# Patient Record
Sex: MALE | Race: WHITE | Employment: UNEMPLOYED | URBAN - METROPOLITAN AREA
[De-identification: names, ages, dates, MRNs, and addresses within clinical notes are randomized per-mention and may not be internally consistent; named-entity substitution may affect disease eponyms.]

---

## 2017-01-06 ENCOUNTER — OFFICE VISIT (OUTPATIENT)
Dept: FAMILY MEDICINE | Facility: CLINIC | Age: 62
End: 2017-01-06
Payer: COMMERCIAL

## 2017-01-06 VITALS
SYSTOLIC BLOOD PRESSURE: 120 MMHG | WEIGHT: 315 LBS | TEMPERATURE: 97.8 F | HEART RATE: 88 BPM | HEIGHT: 78 IN | BODY MASS INDEX: 36.45 KG/M2 | DIASTOLIC BLOOD PRESSURE: 70 MMHG

## 2017-01-06 DIAGNOSIS — E11.65 POORLY CONTROLLED TYPE 2 DIABETES MELLITUS (H): ICD-10-CM

## 2017-01-06 DIAGNOSIS — E66.01 MORBID OBESITY WITH BMI OF 45.0-49.9, ADULT (H): ICD-10-CM

## 2017-01-06 DIAGNOSIS — K42.9 UMBILICAL HERNIA WITHOUT OBSTRUCTION AND WITHOUT GANGRENE: Primary | ICD-10-CM

## 2017-01-06 DIAGNOSIS — M50.30 DDD (DEGENERATIVE DISC DISEASE), CERVICAL: ICD-10-CM

## 2017-01-06 PROCEDURE — 99214 OFFICE O/P EST MOD 30 MIN: CPT | Performed by: FAMILY MEDICINE

## 2017-01-06 RX ORDER — OXYCODONE HCL 40 MG/1
40 TABLET, FILM COATED, EXTENDED RELEASE ORAL EVERY 12 HOURS
Qty: 60 TABLET | Refills: 0 | Status: SHIPPED | OUTPATIENT
Start: 2017-01-15 | End: 2017-01-24

## 2017-01-06 RX ORDER — HYDROCODONE BITARTRATE AND ACETAMINOPHEN 7.5; 325 MG/1; MG/1
1 TABLET ORAL 2 TIMES DAILY
Qty: 60 TABLET | Refills: 0 | Status: SHIPPED | OUTPATIENT
Start: 2017-01-15 | End: 2017-02-13

## 2017-01-06 RX ORDER — OXYCODONE HCL 20 MG/1
20 TABLET, FILM COATED, EXTENDED RELEASE ORAL EVERY 24 HOURS
Qty: 30 TABLET | Refills: 0 | Status: SHIPPED | OUTPATIENT
Start: 2017-01-15 | End: 2017-01-24

## 2017-01-06 NOTE — NURSING NOTE
"Chief Complaint   Patient presents with     RECHECK     abd pain       Initial /70 mmHg  Pulse 88  Temp(Src) 97.8  F (36.6  C) (Tympanic)  Ht 6' 7\" (2.007 m)  Wt 395 lb (179.171 kg)  BMI 44.48 kg/m2 Estimated body mass index is 44.48 kg/(m^2) as calculated from the following:    Height as of this encounter: 6' 7\" (2.007 m).    Weight as of this encounter: 395 lb (179.171 kg).  BP completed using cuff size: matthew Venegas CMA    "

## 2017-01-06 NOTE — PROGRESS NOTES
SUBJECTIVE:                                                    Michael Colorado is a 61 year old male who presents to clinic today for the following health issues:      Concern - follow up stomach pains     Onset: ongoing     Description:   Follow up pain also wondering about colonoscopy    Intensity:     Progression of Symptoms:      Accompanying Signs & Symptoms:         Previous history of similar problem:       Precipitating factors:   Worsened by:     Alleviating factors:  Improved by:        Therapies Tried and outcome:       Problem list and histories reviewed & adjusted, as indicated.  Additional history: as documented    Patient Active Problem List   Diagnosis     Other and unspecified disc disorder     Tinnitus     CARDIOVASCULAR SCREENING; LDL GOAL LESS THAN 130     Advanced directives, counseling/discussion     Primary localized osteoarthrosis, lower leg     Smoker     Morbid obesity with BMI of 45.0-49.9, adult (H)     Morbid obesity (H)     Chronic pain syndrome     Hepatitis C     Obstructive sleep apnea     Tobacco use disorder     Type 2 diabetes mellitus with hyperglycemia (H)     Past Surgical History   Procedure Laterality Date     Surgical history of -   10/03     Lt knee partial medial meniscectomy       Social History   Substance Use Topics     Smoking status: Current Every Day Smoker -- 1.00 packs/day for 30 years     Types: Cigarettes     Smokeless tobacco: Never Used     Alcohol Use: No      Comment: quit      Family History   Problem Relation Age of Onset     DIABETES Brother          Current Outpatient Prescriptions   Medication Sig Dispense Refill     [START ON 1/15/2017] oxyCODONE (OXYCONTIN) 40 MG 12 hr tablet Take 1 tablet (40 mg) by mouth every 12 hours 60 tablet 0     [START ON 1/15/2017] oxyCODONE (OXYCONTIN) 20 MG 12 hr tablet Take 1 tablet (20 mg) by mouth every 24 hours 30 tablet 0     [START ON 1/15/2017] HYDROcodone-acetaminophen (NORCO) 7.5-325 MG per tablet Take 1 tablet by  mouth 2 times daily 60 tablet 0     ACCU-CHEK YESSY PLUS test strip USE 1 STRIP AS INSTRUCTED 4 TIMES DAILY. 300 strip 2     blood glucose monitoring (NO BRAND SPECIFIED) test strip Use to test blood sugars 4 times daily or as directed please provide per formulary approval patient states Verio. 100 strip 3     metFORMIN (GLUCOPHAGE) 500 MG tablet TAKE 2 TABLETS BYMOUTH TWO TIMES DAILY WITH MEALS 120 tablet 0     NOVOLOG FLEXPEN 100 UNIT/ML soln INJECT 20 UNITS SUBCUTANEOUS 4 TIMES DAILY (WITH MEALS AND NIGHTLY) 15 mL 1     insulin glargine (LANTUS SOLOSTAR) 100 UNIT/ML PEN 23 units AM/ 23 units PM 1 Month 3     BD SHANELL U/F 32G X 4 MM insulin pen needle INJECT 1 NEEDLE SUBCUTANEOUSLY 4 TIMES DAILY 200 each 0     aspirin 81 MG tablet Take by mouth daily       Diphenhydramine-APAP, sleep, (TYLENOL PM EXTRA STRENGTH PO) Take by mouth At Bedtime       Allergies   Allergen Reactions     No Known Drug Allergies      Recent Labs   Lab Test  11/15/16   1149  07/18/16   0951 05/19/16 03/18/16   0947  12/03/15   1021   02/05/15   0837  04/29/13   1138   05/17/12   0904   A1C   --   7.6*   --   7.7*  6.7*   < >  7.0*  6.4*   < >  5.7   LDL   --    --    --    --   71   --   77  112   --   134*   HDL   --    --    --    --   28*   --   32*  35*   --   37*   TRIG   --    --    --    --   312*   --   287*  218*   --   167*   ALT  33   --    --    --    --    --   42  50   --   50   CR   --   0.67  0.58*  0.77  0.60*   < >  0.64*  0.60*   --   0.81   GFRESTIMATED   --   >90  Non  GFR Calc    >60  >90  Non  GFR Calc    >90  Non  GFR Calc     < >  >90  Non  GFR Calc    >90   --   >90   GFRESTBLACK   --   >90   GFR Calc     --   >90   GFR Calc    >90   GFR Calc     < >  >90   GFR Calc    >90   --   >90   POTASSIUM   --   4.0  3.7  4.1  3.9   < >  4.0  4.1   --   4.7   TSH   --    --    --    --    --    --    " --   1.25   --   1.68    < > = values in this interval not displayed.      BP Readings from Last 3 Encounters:   01/06/17 120/70   12/29/16 150/80   12/21/16 130/80    Wt Readings from Last 3 Encounters:   01/06/17 395 lb (179.171 kg)   12/29/16 389 lb (176.449 kg)   12/21/16 401 lb (181.892 kg)                    ROS:  Constitutional, HEENT, cardiovascular, pulmonary, gi and gu systems are negative, except as otherwise noted.    OBJECTIVE:                                                    /70 mmHg  Pulse 88  Temp(Src) 97.8  F (36.6  C) (Tympanic)  Ht 6' 7\" (2.007 m)  Wt 395 lb (179.171 kg)  BMI 44.48 kg/m2  Body mass index is 44.48 kg/(m^2).  GENERAL: healthy, alert and no distress  NECK: no adenopathy, no asymmetry, masses, or scars and thyroid normal to palpation  RESP: lungs clear to auscultation - no rales, rhonchi or wheezes  CV: regular rate and rhythm, normal S1 S2, no S3 or S4, no murmur, click or rub, no peripheral edema and peripheral pulses strong  ABDOMEN: soft, nontender, no hepatosplenomegaly, no masses and bowel sounds normal  MS: no gross musculoskeletal defects noted, no edema         ASSESSMENT/PLAN:                                                    ASSESSMENT / PLAN:  (K42.9) Umbilical hernia without obstruction and without gangrene  (primary encounter diagnosis)  Comment: had ER visit with pain, CT showed no abnormal finding, will have him to see surgery  And encouraged him to keep working on wt loss  Plan: GENERAL SURG ADULT REFERRAL            (E11.65) Poorly controlled type 2 diabetes mellitus (H)  Comment: still has fluctuating DM, will have him to see DM educator for refreshing and review insuline dosage   Plan: DIABETES EDUCATOR REFERRAL            (E66.01,  Z68.42) Morbid obesity with BMI of 45.0-49.9, adult (H)  Comment: mentioned above    Plan: DIABETES EDUCATOR REFERRAL            (M50.30) DDD (degenerative disc disease), cervical  Comment: stable with current dose, will " have him to try fill again, if needed PA, will try it(he may need gap coverage with [place] contin as we did last year)  Plan: oxyCODONE (OXYCONTIN) 40 MG 12 hr tablet,         oxyCODONE (OXYCONTIN) 20 MG 12 hr tablet,         HYDROcodone-acetaminophen (NORCO) 7.5-325 MG         per tablet              FUTURE APPOINTMENTS:       - Follow-up visit in 1 month     Edison New [place]  [facility]

## 2017-01-09 ENCOUNTER — OFFICE VISIT (OUTPATIENT)
Dept: SURGERY | Facility: CLINIC | Age: 62
End: 2017-01-09
Payer: COMMERCIAL

## 2017-01-09 VITALS
WEIGHT: 315 LBS | HEIGHT: 78 IN | HEART RATE: 85 BPM | DIASTOLIC BLOOD PRESSURE: 99 MMHG | BODY MASS INDEX: 36.45 KG/M2 | SYSTOLIC BLOOD PRESSURE: 165 MMHG

## 2017-01-09 DIAGNOSIS — K43.9 VENTRAL HERNIA WITHOUT OBSTRUCTION OR GANGRENE: Primary | ICD-10-CM

## 2017-01-09 PROCEDURE — 99244 OFF/OP CNSLTJ NEW/EST MOD 40: CPT | Performed by: SURGERY

## 2017-01-09 NOTE — MR AVS SNAPSHOT
"              After Visit Summary   2017    Michael Colorado    MRN: [id number]           Patient Information     Date Of Birth          1955        Visit Information        Provider Department      2017 11:30 AM Jairo Angel MD [facility]       Follow-ups after your visit        Who to contact     If you have questions or need follow up information about today's clinic visit or your schedule please contact [facility] directly at [phone].  Normal or non-critical lab and imaging results will be communicated to you by App Presshart, letter or phone within 4 business days after the clinic has received the results. If you do not hear from us within 7 days, please contact the clinic through [email or web address] or phone. If you have a critical or abnormal lab result, we will notify you by phone as soon as possible.  Submit refill requests through BodeTree or call your pharmacy and they will forward the refill request to us. Please allow 3 business days for your refill to be completed.          Additional Information About Your Visit        MyChart Information     B[employer]Tree lets you send messages to your doctor, view your test results, renew your prescriptions, schedule appointments and more. To sign up, go to [email or web address] . Click on \"Log in\" on the left side of the screen, which will take you to the Welcome page. Then click on \"Sign up Now\" on the right side of the page.     You will be asked to enter the access code listed below, as well as some personal information. Please follow the directions to create your username and password.     Your access code is: [email or web address]  Expires: 2017 11:51 AM     Your access code will  in 90 days. If you need help or a new code, please call your [place] clinic or [phone].        Care EveryWhere ID     This is your Care EveryWhere ID. This could be used by other organizations to access " "your [facility] medical records  [id number]        Your Vitals Were     Pulse Height BMI (Body Mass Index)             85 6' 7.5\" (2.019 m) 42.84 kg/m2          Blood Pressure from Last 3 Encounters:   01/09/17 165/99   01/06/17 120/70   12/29/16 150/80    Weight from Last 3 Encounters:   01/09/17 385 lb (174.635 kg)   01/06/17 395 lb (179.171 kg)   12/29/16 389 lb (176.449 kg)              Today, you had the following     No orders found for display       Primary Care Provider Office Phone # Fax #    Edison New [phone]       [address] [address]        Thank you!     Thank you for choosing [facility]  for your care. Our goal is always to provide you with excellent care. Hearing back from our patients is one way we can continue to improve our services. Please take a few minutes to complete the written survey that you may receive in the mail after your visit with us. Thank you!             Your Updated Medication List - Protect others around you: Learn how to safely use, store and throw away your medicines at www.disposemymeds.org.          This list is accurate as of: 1/9/17 12:09 PM.  Always use your most recent med list.                   Brand Name Dispense Instructions for use    * ACCU-CHEK YESSY PLUS test strip   Generic drug:  blood glucose monitoring     300 strip    USE 1 STRIP AS INSTRUCTED 4 TIMES DAILY.       * blood glucose monitoring test strip    no brand specified    100 strip    Use to test blood sugars 4 times daily or as directed please provide per formulary approval patient states Verio.       aspirin 81 MG tablet      Take by mouth daily       BD SHANELL U/F 32G X 4 MM   Generic drug:  insulin pen needle     200 each    INJECT 1 NEEDLE SUBCUTANEOUSLY 4 TIMES DAILY       HYDROcodone-acetaminophen 7.5-325 MG per tablet   Start taking on:  1/15/2017    NORCO    60 tablet    Take 1 tablet by mouth 2 times daily    "    insulin glargine 100 UNIT/ML injection    LANTUS SOLOSTAR    1 Month    23 units AM/ 23 units PM       metFORMIN 500 MG tablet    GLUCOPHAGE    120 tablet    TAKE 2 TABLETS BYMOUTH TWO TIMES DAILY WITH MEALS       NovoLOG FLEXPEN 100 UNIT/ML injection   Generic drug:  insulin aspart     15 mL    INJECT 20 UNITS SUBCUTANEOUS 4 TIMES DAILY (WITH MEALS AND NIGHTLY)       * oxyCODONE 40 MG 12 hr tablet   Start taking on:  1/15/2017    OxyCONTIN    60 tablet    Take 1 tablet (40 mg) by mouth every 12 hours       * oxyCODONE 20 MG 12 hr tablet   Start taking on:  1/15/2017    OXYCONTIN    30 tablet    Take 1 tablet (20 mg) by mouth every 24 hours       TYLENOL PM EXTRA STRENGTH PO      Take by mouth At Bedtime       * Notice:  This list has 4 medication(s) that are the same as other medications prescribed for you. Read the directions carefully, and ask your doctor or other care provider to review them with you.

## 2017-01-10 NOTE — PROGRESS NOTES
"[facility]  Surgery Consultation    PCP:  Edison New [phone]    HPI: Patient is a 61 year old male who presents with complaint of ventral hernia. Hernia has been present for last several months. Pain is primarily located in the periumbilical. Patient states the pain is worse with heavy lifting. Pain is rated as moderate. Symptoms are improved with rest. Hernia has been incarcerated. Patient has not had any symptoms of bowel obstruction. Patient denies fevers, chills, nausea, vomiting, SOB, or chest pain,.    PMH:   has a past medical history of Other and unspecified disc disorder of unspecified region; Depressive disorder, not elsewhere classified; Morbid obesity with BMI of 45.0-49.9, adult (H); Morbid obesity with BMI of 45.0-49.9, adult (H); Morbid obesity (H) (1/6/2014); and Type 2 diabetes mellitus with hyperglycemia (H) (10/28/2015).  PSH:    has past surgical history that includes surgical history of -  (10/03).  Social History:   reports that he has been smoking Cigarettes.  He has a 30 pack-year smoking history. He has never used smokeless tobacco. He reports that he does not drink alcohol or use illicit drugs.  Family History:   family history includes DIABETES in his brother.  Medications/Allergies: Home medications and allergies reviewed.    ROS:  The 10 point Review of Systems is negative other than noted in the HPI.    Physical Exam:  /99 mmHg  Pulse 85  Ht 6' 7.5\" (2.019 m)  Wt 385 lb (174.635 kg)  BMI 42.84 kg/m2  GENERAL: Generally appears with significant central obesity, strong smell of cigarettes.  Psych: Alert and Oriented.  Normal affect  Eyes: Sclera clear  Respiratory: Breathing heavy but without distress.  Cardiovascular:  No JVD  GI: Abdomen Distended Soft, Mild tenderness to palpation, periumbilical, ventral hernia palpated.  Lymphatic/Hematologic/Immune:  No lymphadenopathy  Integumentary:  No rashes  Neurological: grossly intact     All new lab and " imaging data was reviewed.     Impression and Plan:  Patient is a 61 year old male with ventral hernias along the midline    PLAN: Laparoscopic ventral hernia repair, smoking cessation, weight loss.  I discussed the rationale and options for repair of ventral hernias including laparoscopic VS open approach, best if repair occurs at least six weeks after smoking cessation and after significant weight loss.  This may not be achieved before the return of symptoms so he is going to look into proceeding with repair sooner rather than later.  The associated risk, benefits, hopeful outcomes and possible complications both in the short and in the long term, were discussed in details with the patient. He indicated understanding of the discussion, asked appropriate questions, and provided consent. Signs and symptoms of incarceration were discussed. If these develop in the interim, he knows to visit the ER. I have provided the patient with an information pamphlet.  TIME SPENT:  45 minutes was spent with patient and greater than 50% of the time was spent counseling and coordination of care.    Thank you very much for this consult, please call me if you have any questoins.    Jairo Angel M.D.  [facility]  [phone]    Please route or send letter to:  Primary Care Provider (PCP) and Referring Provider      HPI      ROS      Physical Exam

## 2017-01-16 ENCOUNTER — TELEPHONE (OUTPATIENT)
Dept: FAMILY MEDICINE | Facility: CLINIC | Age: 62
End: 2017-01-16

## 2017-01-16 DIAGNOSIS — M50.30 DDD (DEGENERATIVE DISC DISEASE), CERVICAL: ICD-10-CM

## 2017-01-16 DIAGNOSIS — M17.10 PRIMARY LOCALIZED OSTEOARTHROSIS, LOWER LEG, UNSPECIFIED LATERALITY: Primary | ICD-10-CM

## 2017-01-16 RX ORDER — MORPHINE SULFATE 60 MG/1
60 TABLET, FILM COATED, EXTENDED RELEASE ORAL 2 TIMES DAILY
Qty: 20 TABLET | Refills: 0 | Status: SHIPPED | OUTPATIENT
Start: 2017-01-16 | End: 2017-01-26

## 2017-01-16 NOTE — TELEPHONE ENCOUNTER
Rx at the  for  and pt was notified. Pt will bring in the 2 oxycontin rx's and give to Sean.  Jacquelin Rousseau,

## 2017-01-16 NOTE — TELEPHONE ENCOUNTER
Pt called and oxycodone is not covered needs a PA. Pt is requesting another med to carry him until PA is approved.He will bring back the hard copies of oxycodone. Please call pt when new rx's are ready to . [phone].

## 2017-01-17 NOTE — TELEPHONE ENCOUNTER
Patient returned hard copy rx for oxycontin 40 mg and 20 mg.     2 rx destroyed and placed in shred box with witness of Jacquelin, team 3 TC.      Sandra Jarrett RN

## 2017-01-24 DIAGNOSIS — M17.10 PRIMARY LOCALIZED OSTEOARTHROSIS, LOWER LEG, UNSPECIFIED LATERALITY: Primary | ICD-10-CM

## 2017-01-24 RX ORDER — OXYCODONE HCL 20 MG/1
20 TABLET, FILM COATED, EXTENDED RELEASE ORAL EVERY 24 HOURS
Qty: 30 TABLET | Refills: 0 | Status: SHIPPED | OUTPATIENT
Start: 2017-01-24 | End: 2017-02-16

## 2017-01-24 RX ORDER — OXYCODONE HCL 40 MG/1
40 TABLET, FILM COATED, EXTENDED RELEASE ORAL EVERY 12 HOURS
Qty: 60 TABLET | Refills: 0 | Status: SHIPPED | OUTPATIENT
Start: 2017-01-24 | End: 2017-02-16

## 2017-01-24 NOTE — TELEPHONE ENCOUNTER
Spoke with pt and asked him to have his pharmacy please fax us the denial from his insurance.    Amelia Venegas CMA

## 2017-01-24 NOTE — TELEPHONE ENCOUNTER
Patient calling to check on PA status for oxycodone. Please advise.     Please call patient with response at [phone] okay to leave detailed message.     Destiny Winslow RN   [facility] - Triage

## 2017-01-24 NOTE — TELEPHONE ENCOUNTER
"Pt called back and reported that the pharmacy could not fax us the rejected claim because they did not have an rx on file to resubmit. Pt needs to 2 new rx's to take to the pharmacy and get \"rejected\" before PA can be started.  Jacquelin Rousseau,     "

## 2017-01-26 RX ORDER — MORPHINE SULFATE 60 MG/1
60 TABLET, FILM COATED, EXTENDED RELEASE ORAL 2 TIMES DAILY
Qty: 20 TABLET | Refills: 0 | Status: SHIPPED | OUTPATIENT
Start: 2017-01-26 | End: 2017-02-03

## 2017-01-26 NOTE — TELEPHONE ENCOUNTER
Controlled Substance Refill Request for Morphine  Problem List Complete:  No     PROVIDER TO CONSIDER COMPLETION OF PROBLEM LIST AND OVERVIEW/CONTROLLED SUBSTANCE AGREEMENT    Last Written Prescription Date:  1/16/2017  Last Fill Quantity: 20,   # refills: 0    Last Office Visit with [facility] primary care provider: 12/29/2016    Future Office visit:     Controlled substance agreement on file: Yes:  Date 6/22/2016.     Processing:  Patient will  in clinic   checked in past 6 months?  Yes 7/18/2016

## 2017-02-03 ENCOUNTER — OFFICE VISIT (OUTPATIENT)
Dept: FAMILY MEDICINE | Facility: CLINIC | Age: 62
End: 2017-02-03
Payer: COMMERCIAL

## 2017-02-03 ENCOUNTER — ALLIED HEALTH/NURSE VISIT (OUTPATIENT)
Dept: EDUCATION SERVICES | Facility: CLINIC | Age: 62
End: 2017-02-03
Payer: COMMERCIAL

## 2017-02-03 VITALS
TEMPERATURE: 97.5 F | SYSTOLIC BLOOD PRESSURE: 146 MMHG | BODY MASS INDEX: 36.45 KG/M2 | HEIGHT: 78 IN | HEART RATE: 92 BPM | DIASTOLIC BLOOD PRESSURE: 74 MMHG | WEIGHT: 315 LBS

## 2017-02-03 DIAGNOSIS — G89.4 CHRONIC PAIN SYNDROME: ICD-10-CM

## 2017-02-03 DIAGNOSIS — E11.9 DIABETES MELLITUS, TYPE 2 (H): ICD-10-CM

## 2017-02-03 DIAGNOSIS — M17.10 PRIMARY LOCALIZED OSTEOARTHROSIS, LOWER LEG, UNSPECIFIED LATERALITY: ICD-10-CM

## 2017-02-03 DIAGNOSIS — Z79.4 TYPE 2 DIABETES MELLITUS WITH HYPERGLYCEMIA, WITH LONG-TERM CURRENT USE OF INSULIN (H): Primary | ICD-10-CM

## 2017-02-03 DIAGNOSIS — E66.01 MORBID OBESITY WITH BMI OF 45.0-49.9, ADULT (H): Primary | ICD-10-CM

## 2017-02-03 DIAGNOSIS — E11.65 TYPE 2 DIABETES MELLITUS WITH HYPERGLYCEMIA, WITH LONG-TERM CURRENT USE OF INSULIN (H): ICD-10-CM

## 2017-02-03 DIAGNOSIS — Z79.4 TYPE 2 DIABETES MELLITUS WITH HYPERGLYCEMIA, WITH LONG-TERM CURRENT USE OF INSULIN (H): ICD-10-CM

## 2017-02-03 DIAGNOSIS — E11.65 POORLY CONTROLLED TYPE 2 DIABETES MELLITUS (H): Primary | ICD-10-CM

## 2017-02-03 DIAGNOSIS — E11.65 TYPE 2 DIABETES MELLITUS WITH HYPERGLYCEMIA, WITH LONG-TERM CURRENT USE OF INSULIN (H): Primary | ICD-10-CM

## 2017-02-03 DIAGNOSIS — F17.200 SMOKER: ICD-10-CM

## 2017-02-03 DIAGNOSIS — E66.01 MORBID OBESITY WITH BMI OF 45.0-49.9, ADULT (H): ICD-10-CM

## 2017-02-03 LAB — HBA1C MFR BLD: 9.3 % (ref 4.3–6)

## 2017-02-03 PROCEDURE — G0108 DIAB MANAGE TRN  PER INDIV: HCPCS

## 2017-02-03 PROCEDURE — 99207 C FOOT EXAM  NO CHARGE: CPT | Performed by: FAMILY MEDICINE

## 2017-02-03 PROCEDURE — 99214 OFFICE O/P EST MOD 30 MIN: CPT | Performed by: FAMILY MEDICINE

## 2017-02-03 PROCEDURE — 83036 HEMOGLOBIN GLYCOSYLATED A1C: CPT | Performed by: FAMILY MEDICINE

## 2017-02-03 PROCEDURE — 36415 COLL VENOUS BLD VENIPUNCTURE: CPT | Performed by: FAMILY MEDICINE

## 2017-02-03 PROCEDURE — 80048 BASIC METABOLIC PNL TOTAL CA: CPT | Performed by: FAMILY MEDICINE

## 2017-02-03 RX ORDER — MORPHINE SULFATE 60 MG/1
60 TABLET, FILM COATED, EXTENDED RELEASE ORAL 2 TIMES DAILY
Qty: 28 TABLET | Refills: 0 | Status: SHIPPED | OUTPATIENT
Start: 2017-02-05 | End: 2017-02-16

## 2017-02-03 ASSESSMENT — ANXIETY QUESTIONNAIRES
3. WORRYING TOO MUCH ABOUT DIFFERENT THINGS: NOT AT ALL
IF YOU CHECKED OFF ANY PROBLEMS ON THIS QUESTIONNAIRE, HOW DIFFICULT HAVE THESE PROBLEMS MADE IT FOR YOU TO DO YOUR WORK, TAKE CARE OF THINGS AT HOME, OR GET ALONG WITH OTHER PEOPLE: NOT DIFFICULT AT ALL
1. FEELING NERVOUS, ANXIOUS, OR ON EDGE: NOT AT ALL
5. BEING SO RESTLESS THAT IT IS HARD TO SIT STILL: NOT AT ALL
6. BECOMING EASILY ANNOYED OR IRRITABLE: NOT AT ALL
2. NOT BEING ABLE TO STOP OR CONTROL WORRYING: NOT AT ALL
GAD7 TOTAL SCORE: 0
7. FEELING AFRAID AS IF SOMETHING AWFUL MIGHT HAPPEN: NOT AT ALL

## 2017-02-03 ASSESSMENT — PATIENT HEALTH QUESTIONNAIRE - PHQ9: 5. POOR APPETITE OR OVEREATING: NOT AT ALL

## 2017-02-03 NOTE — PATIENT INSTRUCTIONS
I will ask Dr New about this plan.   Tresiba instead of Lantus. Take 42 units once a day in the morning.   We reduce by 10% and will go up again if your numbers are still high. It will take 4 days to get to steady state.   The night before you start Tresiba skip the evening dose of Lantus.     Novolog switch over to Humalog. Unit for unit switch, meaning the dose is the same. Just a different company.     Goal blood sugars are  in the morning or before meals.   2 hours after a meal less than 180 mg/ld.   We want all blood sugars less than 180 mg/dl to prevent damage to blood vessels and nerves.     If any of this changes I will call you after I talk to Dr New.     Constanza Charles RD, LD, [phone].

## 2017-02-03 NOTE — MR AVS SNAPSHOT
"              After Visit Summary   2/3/2017    Michael Colorado    MRN: [id number]           Patient Information     Date Of Birth          1955        Visit Information        Provider Department      2/3/2017 10:20 AM Edison New MD [facility]        Today's Diagnoses     Morbid obesity with BMI of 45.0-49.9, adult (H)    -  1     Type 2 diabetes mellitus with hyperglycemia, with long-term current use of insulin (H)         Primary localized osteoarthrosis, lower leg, unspecified laterality         Smoker         Chronic pain syndrome            Follow-ups after your visit        Your next 10 appointments already scheduled     Feb 03, 2017 11:00 AM   Diabetic Education with [facility] DIABETIC ED RESOURCE   [facility] ([facility])    [address] [place] [address]   [phone]              Who to contact     If you have questions or need follow up information about today's clinic visit or your schedule please contact [facility] directly at [phone].  Normal or non-critical lab and imaging results will be communicated to you by MyChart, letter or phone within 4 business days after the clinic has received the results. If you do not hear from us within 7 days, please contact the clinic through Buzzwirehart or phone. If you have a critical or abnormal lab result, we will notify you by phone as soon as possible.  Submit refill requests through First [employer] or call your pharmacy and they will forward the refill request to us. Please allow 3 business days for your refill to be completed.          Additional Information About Your Visit        MyChart Information     [employer] lets you send messages to your doctor, view your test results, renew your prescriptions, schedule appointments and more. To sign up, go to [email or web address] . Click on \"Log in\" on the left side of the screen, which will take you to the Welcome page. Then click " "on \"Sign up Now\" on the right side of the page.     You will be asked to enter the access code listed below, as well as some personal information. Please follow the directions to create your username and password.     Your access code is: [email or web address]  Expires: 2017 11:51 AM     Your access code will  in 90 days. If you need help or a new code, please call your [facility] or [phone].        Care EveryWhere ID     This is your Care EveryWhere ID. This could be used by other organizations to access your [facility] medical records  [id number]        Your Vitals Were     Pulse Temperature Height BMI (Body Mass Index)          92 97.5  F (36.4  C) (Tympanic) 6' 7.5\" (2.019 m) 43.06 kg/m2         Blood Pressure from Last 3 Encounters:   17 146/74   17 165/99   17 120/70    Weight from Last 3 Encounters:   17 387 lb (175.542 kg)   17 385 lb (174.635 kg)   17 395 lb (179.171 kg)              We Performed the Following     Basic metabolic panel  (Ca, Cl, CO2, Creat, Gluc, K, Na, BUN)     FOOT EXAM     Hemoglobin A1c          Where to get your medicines      Some of these will need a paper prescription and others can be bought over the counter.  Ask your nurse if you have questions.     Bring a paper prescription for each of these medications    - morphine 60 MG 12 hr tablet       Primary Care Provider Office Phone # Fax #    Edison JULIO New [phone]       [address] [address]        Thank you!     Thank you for choosing [facility]  for your care. Our goal is always to provide you with excellent care. Hearing back from our patients is one way we can continue to improve our services. Please take a few minutes to complete the written survey that you may receive in the mail after your visit with us. Thank you!             Your Updated Medication List - Protect others around you: Learn how " to safely use, store and throw away your medicines at www.disposemymeds.org.          This list is accurate as of: 2/3/17 10:58 AM.  Always use your most recent med list.                   Brand Name Dispense Instructions for use    aspirin 81 MG tablet      Take by mouth daily       BD SHANELL U/F 32G X 4 MM   Generic drug:  insulin pen needle     200 each    INJECT 1 NEEDLE SUBCUTANEOUSLY 4 TIMES DAILY       blood glucose monitoring test strip    no brand specified    100 strip    Use to test blood sugars 4 times daily or as directed please provide per formulary approval patient states Verio.       HYDROcodone-acetaminophen 7.5-325 MG per tablet    NORCO    60 tablet    Take 1 tablet by mouth 2 times daily       insulin glargine 100 UNIT/ML injection    LANTUS SOLOSTAR    1 Month    23 units AM/ 23 units PM       metFORMIN 500 MG tablet    GLUCOPHAGE    120 tablet    TAKE 2 TABLETS BYMOUTH TWO TIMES DAILY WITH MEALS       morphine 60 MG 12 hr tablet   Start taking on:  2/5/2017    MS CONTIN    28 tablet    Take 1 tablet (60 mg) by mouth 2 times daily       NovoLOG FLEXPEN 100 UNIT/ML injection   Generic drug:  insulin aspart     15 mL    INJECT 20 UNITS SUBCUTANEOUS 4 TIMES DAILY (WITH MEALS AND NIGHTLY)       * oxyCODONE 20 MG 12 hr tablet    OXYCONTIN    30 tablet    Take 1 tablet (20 mg) by mouth every 24 hours       * oxyCODONE 40 MG 12 hr tablet    OxyCONTIN    60 tablet    Take 1 tablet (40 mg) by mouth every 12 hours       TYLENOL PM EXTRA STRENGTH PO      Take by mouth At Bedtime       * Notice:  This list has 2 medication(s) that are the same as other medications prescribed for you. Read the directions carefully, and ask your doctor or other care provider to review them with you.

## 2017-02-03 NOTE — TELEPHONE ENCOUNTER
[facility] made formulary changes to insulins.     NovoLog is not covered. Humalog is covered. Michael was instructed this is a unit for unit switch. Taught new insulin pen.     Lantus is no longer covered. Tresiba is covered. It has 40% less hypoglycemia which he has a fear of. Because of the longer action time he can also go back to once a day injections. U200 Tresiba insulin pen will do the dose conversion. Start with 42 units a day in the morning and after 4 days to steady state he can increase if needed until blood sugars are in target. This is a 10% reduction because many people are using 10-20% less insulin on Tresiba. Because his blood sugars are elevated I left the prescription at 46 units in case we need to increase dose.     Patient prefers to follow up on blood sugars by phone.     States he needs insulin pen needles and refill of metformin.     Orders pended as above. Routed to Dr New. Patient has a couple of each insulin pens left. He plans to use these up first.     See also diabetes education visit today.     Constanza Charles RD, LD, CDE

## 2017-02-03 NOTE — MR AVS SNAPSHOT
After Visit Summary   2/3/2017    Michael Colorado    MRN: [id number]           Patient Information     Date Of Birth          1955        Visit Information        Provider Department      2/3/2017 11:00 AM  DIABETIC ED RESOURCE [facility]        Care Instructions    I will ask Dr New about this plan.   Tresiba instead of Lantus. Take 42 units once a day in the morning.   We reduce by 10% and will go up again if your numbers are still high. It will take 4 days to get to steady state.   The night before you start Tresiba skip the evening dose of Lantus.     Novolog switch over to Humalog. Unit for unit switch, meaning the dose is the same. Just a different company.     Goal blood sugars are  in the morning or before meals.   2 hours after a meal less than 180 mg/ld.   We want all blood sugars less than 180 mg/dl to prevent damage to blood vessels and nerves.     If any of this changes I will call you after I talk to Dr New.     Constanza Charles RD, LD, [phone].          Follow-ups after your visit        Who to contact     If you have questions or need follow up information about today's clinic visit or your schedule please contact [facility] directly at [phone].  Normal or non-critical lab and imaging results will be communicated to you by Mac[employer], letter or phone within 4 business days after the clinic has received the results. If you do not hear from us within 7 days, please contact the clinic through [employer]awhart or phone. If you have a critical or abnormal lab result, we will notify you by phone as soon as possible.  Submit refill requests through [employer] or call your pharmacy and they will forward the refill request to us. Please allow 3 business days for your refill to be completed.          Additional Information About Your Visit        [employer] Information     [employer] lets you send messages to your doctor, view your test results, renew  "your prescriptions, schedule appointments and more. To sign up, go to [email or web address] . Click on \"Log in\" on the left side of the screen, which will take you to the Welcome page. Then click on \"Sign up Now\" on the right side of the page.     You will be asked to enter the access code listed below, as well as some personal information. Please follow the directions to create your username and password.     Your access code is: [email or web address]  Expires: 2017 11:51 AM     Your access code will  in 90 days. If you need help or a new code, please call your [facility] clinic or [phone].        Care EveryWhere ID     This is your Care EveryWhere ID. This could be used by other organizations to access your [facility] medical records  [id number]         Blood Pressure from Last 3 Encounters:   17 146/74   17 165/99   17 120/70    Weight from Last 3 Encounters:   17 387 lb (175.542 kg)   17 385 lb (174.635 kg)   17 395 lb (179.171 kg)              Today, you had the following     No orders found for display         Where to get your medicines      Some of these will need a paper prescription and others can be bought over the counter.  Ask your nurse if you have questions.     Bring a paper prescription for each of these medications    - morphine 60 MG 12 hr tablet       Primary Care Provider Office Phone # Fax #    Edison New [phone]       [address] [address]        Thank you!     Thank you for choosing [facility]  for your care. Our goal is always to provide you with excellent care. Hearing back from our patients is one way we can continue to improve our services. Please take a few minutes to complete the written survey that you may receive in the mail after your visit with us. Thank you!             Your Updated Medication List - Protect others around you: Learn how to safely use, " store and throw away your medicines at www.disposemymeds.org.          This list is accurate as of: 2/3/17 11:57 AM.  Always use your most recent med list.                   Brand Name Dispense Instructions for use    aspirin 81 MG tablet      Take by mouth daily       BD SHANELL U/F 32G X 4 MM   Generic drug:  insulin pen needle     200 each    INJECT 1 NEEDLE SUBCUTANEOUSLY 4 TIMES DAILY       blood glucose monitoring test strip    no brand specified    100 strip    Use to test blood sugars 4 times daily or as directed please provide per formulary approval patient states Verio.       HYDROcodone-acetaminophen 7.5-325 MG per tablet    NORCO    60 tablet    Take 1 tablet by mouth 2 times daily       insulin glargine 100 UNIT/ML injection    LANTUS SOLOSTAR    1 Month    23 units AM/ 23 units PM       metFORMIN 500 MG tablet    GLUCOPHAGE    120 tablet    TAKE 2 TABLETS BYMOUTH TWO TIMES DAILY WITH MEALS       morphine 60 MG 12 hr tablet   Start taking on:  2/5/2017    MS CONTIN    28 tablet    Take 1 tablet (60 mg) by mouth 2 times daily       NovoLOG FLEXPEN 100 UNIT/ML injection   Generic drug:  insulin aspart     15 mL    INJECT 20 UNITS SUBCUTANEOUS 4 TIMES DAILY (WITH MEALS AND NIGHTLY)       * oxyCODONE 20 MG 12 hr tablet    OXYCONTIN    30 tablet    Take 1 tablet (20 mg) by mouth every 24 hours       * oxyCODONE 40 MG 12 hr tablet    OxyCONTIN    60 tablet    Take 1 tablet (40 mg) by mouth every 12 hours       TYLENOL PM EXTRA STRENGTH PO      Take by mouth At Bedtime       * Notice:  This list has 2 medication(s) that are the same as other medications prescribed for you. Read the directions carefully, and ask your doctor or other care provider to review them with you.

## 2017-02-03 NOTE — PROGRESS NOTES
SUBJECTIVE:                                                    Michael Colorado is a 62 year old male who presents to clinic today for the following health issues:      Diabetes Follow-up      Patient is checking blood sugars: 250    Diabetic concerns: None     Symptoms of hypoglycemia (low blood sugar): none     Paresthesias (numbness or burning in feet) or sores: No     Date of last diabetic eye exam:        Amount of exercise or physical activity: None    Problems taking medications regularly: No    Medication side effects: none  Diet: regular (no restrictions)  Medication Followup of pain meds    Taking Medication as prescribed: yes    Side Effects:  None    Medication Helping Symptoms:  yes     Problem list and histories reviewed & adjusted, as indicated.  Additional history: as documented    Patient Active Problem List   Diagnosis     Other and unspecified disc disorder     Tinnitus     CARDIOVASCULAR SCREENING; LDL GOAL LESS THAN 130     Advanced directives, counseling/discussion     Primary localized osteoarthrosis, lower leg     Smoker     Morbid obesity with BMI of 45.0-49.9, adult (H)     Morbid obesity (H)     Chronic pain syndrome     Hepatitis C     Obstructive sleep apnea     Tobacco use disorder     Type 2 diabetes mellitus with hyperglycemia (H)     Past Surgical History   Procedure Laterality Date     Surgical history of -   10/03     Lt knee partial medial meniscectomy       Social History   Substance Use Topics     Smoking status: Current Every Day Smoker -- 1.00 packs/day for 30 years     Types: Cigarettes     Smokeless tobacco: Never Used     Alcohol Use: No      Comment: quit      Family History   Problem Relation Age of Onset     DIABETES Brother          Current Outpatient Prescriptions   Medication Sig Dispense Refill     [START ON 2/5/2017] morphine (MS CONTIN) 60 MG 12 hr tablet Take 1 tablet (60 mg) by mouth 2 times daily 28 tablet 0     oxyCODONE (OXYCONTIN) 20 MG 12 hr tablet Take 1  tablet (20 mg) by mouth every 24 hours 30 tablet 0     oxyCODONE (OXYCONTIN) 40 MG 12 hr tablet Take 1 tablet (40 mg) by mouth every 12 hours 60 tablet 0     HYDROcodone-acetaminophen (NORCO) 7.5-325 MG per tablet Take 1 tablet by mouth 2 times daily 60 tablet 0     blood glucose monitoring (NO BRAND SPECIFIED) test strip Use to test blood sugars 4 times daily or as directed please provide per formulary approval patient states Verio. 100 strip 3     metFORMIN (GLUCOPHAGE) 500 MG tablet TAKE 2 TABLETS BYMOUTH TWO TIMES DAILY WITH MEALS 120 tablet 0     NOVOLOG FLEXPEN 100 UNIT/ML soln INJECT 20 UNITS SUBCUTANEOUS 4 TIMES DAILY (WITH MEALS AND NIGHTLY) 15 mL 1     insulin glargine (LANTUS SOLOSTAR) 100 UNIT/ML PEN 23 units AM/ 23 units PM 1 Month 3     BD SHANELL U/F 32G X 4 MM insulin pen needle INJECT 1 NEEDLE SUBCUTANEOUSLY 4 TIMES DAILY 200 each 0     aspirin 81 MG tablet Take by mouth daily       Diphenhydramine-APAP, sleep, (TYLENOL PM EXTRA STRENGTH PO) Take by mouth At Bedtime       Allergies   Allergen Reactions     No Known Drug Allergies      Recent Labs   Lab Test  11/15/16   1149  07/18/16   0951 05/19/16 03/18/16   0947  12/03/15   1021   02/05/15   0837  04/29/13   1138   05/17/12   0904   A1C   --   7.6*   --   7.7*  6.7*   < >  7.0*  6.4*   < >  5.7   LDL   --    --    --    --   71   --   77  112   --   134*   HDL   --    --    --    --   28*   --   32*  35*   --   37*   TRIG   --    --    --    --   312*   --   287*  218*   --   167*   ALT  33   --    --    --    --    --   42  50   --   50   CR   --   0.67  0.58*  0.77  0.60*   < >  0.64*  0.60*   --   0.81   GFRESTIMATED   --   >90  Non  GFR Calc    >60  >90  Non  GFR Calc    >90  Non  GFR Calc     < >  >90  Non  GFR Calc    >90   --   >90   GFRESTBLACK   --   >90   GFR Calc     --   >90   GFR Calc    >90   GFR Calc     < >  >90    "American GFR Calc    >90   --   >90   POTASSIUM   --   4.0  3.7  4.1  3.9   < >  4.0  4.1   --   4.7   TSH   --    --    --    --    --    --    --   1.25   --   1.68    < > = values in this interval not displayed.      BP Readings from Last 3 Encounters:   02/03/17 146/74   01/09/17 165/99   01/06/17 120/70    Wt Readings from Last 3 Encounters:   02/03/17 387 lb (175.542 kg)   01/09/17 385 lb (174.635 kg)   01/06/17 395 lb (179.171 kg)                    ROS:  Constitutional, HEENT, cardiovascular, pulmonary, gi and gu systems are negative, except as otherwise noted.    OBJECTIVE:                                                    /74 mmHg  Pulse 92  Temp(Src) 97.5  F (36.4  C) (Tympanic)  Ht 6' 7.5\" (2.019 m)  Wt 387 lb (175.542 kg)  BMI 43.06 kg/m2  Body mass index is 43.06 kg/(m^2).  GENERAL: healthy, alert and no distress  NECK: no adenopathy, no asymmetry, masses, or scars and thyroid normal to palpation  RESP: lungs clear to auscultation - no rales, rhonchi or wheezes  CV: regular rate and rhythm, normal S1 S2, no S3 or S4, no murmur, click or rub, no peripheral edema and peripheral pulses strong  ABDOMEN: soft, nontender, no hepatosplenomegaly, no masses and bowel sounds normal  MS: no gross musculoskeletal defects noted, no edema         ASSESSMENT/PLAN:                                                    ASSESSMENT / PLAN:  (E66.01,  Z68.42) Morbid obesity with BMI of 45.0-49.9, adult (H)  (primary encounter diagnosis)  Comment: has no change, encouraged him to keep working on life style modification for better DM control   Plan: Hemoglobin A1c, Basic metabolic panel  (Ca, Cl,        CO2, Creat, Gluc, K, Na, BUN), FOOT EXAM            (E11.65,  Z79.4) Type 2 diabetes mellitus with hyperglycemia, with long-term current use of insulin (H)  Comment: mentioned above, formulary change for lantus and novolog, referred once to DM educator but pt did not make it, will have him to reschedule to see DM " educator for further conversation about formulary change and refreshing DM control   Plan: Hemoglobin A1c, Basic metabolic panel  (Ca, Cl,        CO2, Creat, Gluc, K, Na, BUN), FOOT EXAM            (M17.10) Primary localized osteoarthrosis, lower leg, unspecified laterality  Comment: has been waiting for PA approval on Oxycontin, did not hear any answer yet, will keep him on MS contin while waiting the PA process for now   Plan: morphine (MS CONTIN) 60 MG 12 hr tablet            (F17.200) Smoker  Comment: not changing bahvior  Plan: will keep encourage him to work on smoking cessation     (G89.4) Chronic pain syndrome  Comment: has been stable with current usage except matter mentioned above about PA  Plan: will keep watching sx         FUTURE APPOINTMENTS:       - Follow-up visit in 1 month     Edison New MD  [facility] [place]

## 2017-02-03 NOTE — NURSING NOTE
"Chief Complaint   Patient presents with     Diabetes       Initial /74 mmHg  Pulse 92  Temp(Src) 97.5  F (36.4  C) (Tympanic)  Ht 6' 7.5\" (2.019 m)  Wt 387 lb (175.542 kg)  BMI 43.06 kg/m2 Estimated body mass index is 43.06 kg/(m^2) as calculated from the following:    Height as of this encounter: 6' 7.5\" (2.019 m).    Weight as of this encounter: 387 lb (175.542 kg).  BP completed using cuff size: matthew Venegas CMA    "

## 2017-02-03 NOTE — PROGRESS NOTES
"Diabetes Self Management Training: Follow-up Visit    Michael Colorado presents today for education related to Type 2 diabetes.    He is accompanied by self    Patient's diabetes management related comments/concerns: lost a little weight.     Patient would like this visit to be focused around the following diabetes-related behaviors and goals: medication coverage.    ASSESSMENT:  Patient Problem List reviewed for relevant medical history and current medical status.    Current Diabetes Management per Patient:  Taking diabetes medications?   yes:     Diabetes Medication(s)     Biguanides Sig    metFORMIN (GLUCOPHAGE) 500 MG tablet TAKE 2 TABLETS BYMOUTH TWO TIMES DAILY WITH MEALS    Insulin Sig    NOVOLOG FLEXPEN 100 UNIT/ML soln INJECT 20 UNITS SUBCUTANEOUS 4 TIMES DAILY (WITH MEALS AND NIGHTLY)    insulin glargine (LANTUS SOLOSTAR) 100 UNIT/ML PEN 23 units AM/ 23 units PM      Using Novbolog 5-20 units per meal. Some fears of hypoglycemia.     Patient glucose self monitoring as follows: two times daily.   BG meter: One Touch Verio meter  BG results: Stated: did not bring glucose meter. Has also been sick. \"did not eat for 3 days\"   up at 8/9 am fasting glucose- 10 am 256-411. and post-supper glucose- 250 mg/dl.      BG values are: Not in goal  Patient's most recent A1C      9.3   2/3/2017 is not meeting goal of <7.0    Nutrition:  Patient skips breakfast regularly    Breakfast - 10 am lg coffee with plain cream and 1.5 tsp sugar  Lunch - 12 pm sandwich or leftovers. Meat, tristan, lettuce maybe tomatoes. 2 bread. Coffee same as above.    Dinner - 6 pm. Roast or goulash. BLT. Beef and chicken and pork. Veggies. Canned peas, carrots. Cabbage.    Snacks - 8/9 am small bowl sweetened cereal and milk.     Beverages: ice water. (no pops or juices)    Cultural/Uatsdin diet restrictions: No     Biggest Challenge to Healthy Eating: portion control at dinner.    Physical Activity:    none    Diabetes Complications:  Not discussed " "today.    Vitals:  There were no vitals taken for this visit. See MD visit today.  Estimated body mass index is 43.06 kg/(m^2) as calculated from the following:    Height as of 1/9/17: 6' 7.5\" (2.019 m).    Weight as of an earlier encounter on 2/3/17: 387 lb (175.542 kg).   Last 3 BP:   BP Readings from Last 3 Encounters:   02/03/17 146/74   01/09/17 165/99   01/06/17 120/70       History   Smoking status     Current Every Day Smoker -- 1.00 packs/day for 30 years     Types: Cigarettes   Smokeless tobacco     Never Used       Labs:  A1C      9.3   2/3/2017  GLC      263   7/18/2016  LDL       71   12/3/2015  HDL CHOLESTEROL   Date Value Ref Range Status   12/03/2015 28* >39 mg/dL Final   ]  GFR ESTIMATE   Date Value Ref Range Status   07/18/2016 >90  Non  GFR Calc   >60 mL/min/1.7m2 Final     GFR ESTIMATE IF BLACK   Date Value Ref Range Status   07/18/2016 >90   GFR Calc   >60 mL/min/1.7m2 Final     CR     0.67   7/18/2016  No results found for this basename: microalbumin    Health Beliefs and Attitudes:   Patient Activation Measure Survey Score:  SHAKEEL Score (Last Two) 8/19/2011   SHAKEEL Raw Score 42   Activation Score 66   SHAKEEL Level 3       Diabetes knowledge and skills assessment:     Patient is knowledgeable in diabetes management concepts related to: Healthy Eating, Monitoring and Taking Medication    Patient needs further education on the following diabetes management concepts: Problem Solving, Reducing Risks and Healthy Coping    Barriers to Learning Assessment: No Barriers identified    Based on learning assessment above, most appropriate setting for further diabetes education would be: Individual setting.    INTERVENTION:  1 unit for 15 mg/dl to correct blood sugar with a target of 150 mg/dl. \"10 units for a blood sugar of 300 mg/dl\"  Seems to be storing insulin and giving insulin correctly. Except he re-uses needles and reduces his meal time insulin due to fear of hypoglycemia. " He witnessed his brother have a bad hypoglycemic event. He feels low at 80 mg/dl. He gives insulin with lunch and dinner. Not with am coffee or bedtime cereal.   Discussed that all blood sugars need to be less than 180 mg/dl to prevent diabetes complications.     Concerns about medication costs but insulins have been covered at 100%.   [facility] made glucose meter changes. He has made the switch.   [facility] is also making insulin changes. Humalog instead of NovoLog. Tresiba instead of Lantus.   With the insulin action time of Tresiba he can go back to once a day injecting. Cut initial dose by 10% due to many people using less Tresiba. Increase as needed after a 4 day period to steady state. He might benefit from U200 for even better absorption. Current is on 23 units of Lantus BID. He will take 42 units of Tresiba in the morning.   Instructed on the use if the insulin pen, storage including how long the pen is good once a needle is put in it, Sharps disposal, sites for insulin administration, and hypoglycemia.  Patient was able to demonstrate accurate technique.     Education provided today on:  AADE Self-Care Behaviors:  Healthy Eating: consistency in amount, composition, and timing of food intake and plate planning method. Review.  Taking Medication: drawing up, administering and storing injectable diabetes medications, proper site selection and rotation for injections and when to take medications    Opportunities for ongoing education and support in diabetes-self management were discussed.    Pt verbalized understanding of concepts discussed and recommendations provided today.       Education Materials Provided:  [facility] Understanding Diabetes Booklet, Tresiba and [place] Insulin information.     PLAN:  See Patient Instructions for co-developed, patient-stated behavior change goals.  Keep a blood glucose record for next visit.  See above and AVS for formulary insulin changes and instructions.   AVS printed and  provided to patient today.    FOLLOW-UP:  Follow-up planned for BG review by phone/e-mail/MyChart.     Ongoing plan for education and support: Written resources (magazines, books, etc.) and Follow-up with primary care provider    Constanza Charles RD, LD, CDE    Time Spent: 60 minutes  Encounter Type: Individual    Any diabetes medication dose changes were made via the CDE Protocol and Collaborative Practice Agreement with the patient's referring provider. Because his blood sugars are elevated I left the prescription at 46 units in case we need to increase dose. Verbal discussion of insulin changes. Orders sent to PCP in telephone encounter.

## 2017-02-04 LAB
ANION GAP SERPL CALCULATED.3IONS-SCNC: 8 MMOL/L (ref 3–14)
BUN SERPL-MCNC: 8 MG/DL (ref 7–30)
CALCIUM SERPL-MCNC: 9.4 MG/DL (ref 8.5–10.1)
CHLORIDE SERPL-SCNC: 101 MMOL/L (ref 94–109)
CO2 SERPL-SCNC: 28 MMOL/L (ref 20–32)
CREAT SERPL-MCNC: 0.62 MG/DL (ref 0.66–1.25)
GFR SERPL CREATININE-BSD FRML MDRD: ABNORMAL ML/MIN/1.7M2
GLUCOSE SERPL-MCNC: 333 MG/DL (ref 70–99)
POTASSIUM SERPL-SCNC: 4.6 MMOL/L (ref 3.4–5.3)
SODIUM SERPL-SCNC: 137 MMOL/L (ref 133–144)

## 2017-02-04 ASSESSMENT — PATIENT HEALTH QUESTIONNAIRE - PHQ9: SUM OF ALL RESPONSES TO PHQ QUESTIONS 1-9: 2

## 2017-02-04 ASSESSMENT — ANXIETY QUESTIONNAIRES: GAD7 TOTAL SCORE: 0

## 2017-02-07 ENCOUNTER — TELEPHONE (OUTPATIENT)
Dept: FAMILY MEDICINE | Facility: CLINIC | Age: 62
End: 2017-02-07

## 2017-02-07 NOTE — TELEPHONE ENCOUNTER
Spoke with pt's pharmacy and both oxycontin scripts are not covered by pt's insurance.    [facility] ID [id number]  BIN - [id number]  PCN - [id number]  RX - [id number]    Help desk [phone]  PA [phone]    PA's done on covermymeds.    Waiting for response.    Amelia Venegas CMA

## 2017-02-10 DIAGNOSIS — M50.30 DDD (DEGENERATIVE DISC DISEASE), CERVICAL: Primary | ICD-10-CM

## 2017-02-10 NOTE — TELEPHONE ENCOUNTER
Controlled Substance Refill Request for HYDROcodone-acetaminophen (NORCO) 7.5-325 MG per tablet  Problem List Complete:  No     PROVIDER TO CONSIDER COMPLETION OF PROBLEM LIST AND OVERVIEW/CONTROLLED SUBSTANCE AGREEMENT    Last Written Prescription Date:  1/15/17  Last Fill Quantity: 60,   # refills: 0    Last Office Visit with [facility] primary care provider: 2/3/17    Future Office visit:   Next 5 appointments (look out 90 days)     Feb 16, 2017 10:00 AM   Office Visit with Edison New MD   [facility] ([address] [address]   [phone]                  Controlled substance agreement on file: Yes:  Date 6/17/16.     Processing:  Patient will  in clinic   checked in past 6 months?  Yes 7/18/16       Martha MOSLEY

## 2017-02-11 RX ORDER — HYDROCODONE BITARTRATE AND ACETAMINOPHEN 7.5; 325 MG/1; MG/1
1 TABLET ORAL 2 TIMES DAILY
Qty: 60 TABLET | Refills: 0 | OUTPATIENT
Start: 2017-02-11

## 2017-02-13 RX ORDER — HYDROCODONE BITARTRATE AND ACETAMINOPHEN 7.5; 325 MG/1; MG/1
1 TABLET ORAL 2 TIMES DAILY
Qty: 60 TABLET | Refills: 0 | Status: SHIPPED | OUTPATIENT
Start: 2017-02-14 | End: 2017-02-14

## 2017-02-14 DIAGNOSIS — M50.30 DDD (DEGENERATIVE DISC DISEASE), CERVICAL: ICD-10-CM

## 2017-02-14 RX ORDER — HYDROCODONE BITARTRATE AND ACETAMINOPHEN 7.5; 325 MG/1; MG/1
1 TABLET ORAL 2 TIMES DAILY
Qty: 60 TABLET | Refills: 0 | Status: SHIPPED | OUTPATIENT
Start: 2017-02-14 | End: 2017-03-10

## 2017-02-16 ENCOUNTER — OFFICE VISIT (OUTPATIENT)
Dept: FAMILY MEDICINE | Facility: CLINIC | Age: 62
End: 2017-02-16
Payer: COMMERCIAL

## 2017-02-16 VITALS
HEART RATE: 82 BPM | HEIGHT: 78 IN | SYSTOLIC BLOOD PRESSURE: 138 MMHG | TEMPERATURE: 98 F | DIASTOLIC BLOOD PRESSURE: 89 MMHG | BODY MASS INDEX: 36.45 KG/M2 | WEIGHT: 315 LBS

## 2017-02-16 DIAGNOSIS — M17.10 PRIMARY LOCALIZED OSTEOARTHROSIS, LOWER LEG, UNSPECIFIED LATERALITY: ICD-10-CM

## 2017-02-16 PROCEDURE — 99213 OFFICE O/P EST LOW 20 MIN: CPT | Performed by: FAMILY MEDICINE

## 2017-02-16 RX ORDER — MORPHINE SULFATE 60 MG/1
60 TABLET, FILM COATED, EXTENDED RELEASE ORAL EVERY 12 HOURS
Qty: 60 TABLET | Refills: 0 | Status: SHIPPED | OUTPATIENT
Start: 2017-02-18 | End: 2017-02-24

## 2017-02-16 NOTE — NURSING NOTE
"Chief Complaint   Patient presents with     Recheck Medication       Initial /89 (Cuff Size: Adult Large)  Pulse 82  Temp 98  F (36.7  C) (Tympanic)  Ht 6' 7.5\" (2.019 m)  Wt (!) 386 lb (175.1 kg)  BMI 42.94 kg/m2 Estimated body mass index is 42.94 kg/(m^2) as calculated from the following:    Height as of this encounter: 6' 7.5\" (2.019 m).    Weight as of this encounter: 386 lb (175.1 kg).  Medication Reconciliation: complete     Amelia Venegas CMA      "

## 2017-02-16 NOTE — PROGRESS NOTES
SUBJECTIVE:                                                    Michael Colorado is a 62 year old male who presents to clinic today for the following health issues:      Medication Followup of pain meds    Taking Medication as prescribed: yes    Side Effects:  None    Medication Helping Symptoms:  yes     Problem list and histories reviewed & adjusted, as indicated.  Additional history: as documented    Patient Active Problem List   Diagnosis     Other and unspecified disc disorder     Tinnitus     CARDIOVASCULAR SCREENING; LDL GOAL LESS THAN 130     Advanced directives, counseling/discussion     Primary localized osteoarthrosis, lower leg     Smoker     Morbid obesity with BMI of 45.0-49.9, adult (H)     Morbid obesity (H)     Chronic pain syndrome     Hepatitis C     Obstructive sleep apnea     Tobacco use disorder     Type 2 diabetes mellitus with hyperglycemia (H)     Past Surgical History   Procedure Laterality Date     Surgical history of -   10/03     Lt knee partial medial meniscectomy       Social History   Substance Use Topics     Smoking status: Current Every Day Smoker     Packs/day: 1.00     Years: 30.00     Types: Cigarettes     Smokeless tobacco: Never Used     Alcohol use No      Comment: quit      Family History   Problem Relation Age of Onset     DIABETES Brother          Current Outpatient Prescriptions   Medication Sig Dispense Refill     [START ON 2/18/2017] morphine (MS CONTIN) 60 MG 12 hr tablet Take 1 tablet (60 mg) by mouth every 12 hours 60 tablet 0     HYDROcodone-acetaminophen (NORCO) 7.5-325 MG per tablet Take 1 tablet by mouth 2 times daily 60 tablet 0     insulin pen needle (BD SHANELL U/F) 32G X 4 MM Inject Subcutaneous 4 times daily Use 4 pen needles daily or as directed. 200 each 5     metFORMIN (GLUCOPHAGE) 500 MG tablet Take 2 tablets (1,000 mg) by mouth 2 times daily (with meals) 120 tablet 5     insulin lispro (HUMALOG KWIKPEN) 100 UNIT/ML injection 20 units before breakfast, 20  units before lunch, 20 units before dinner 15 mL 3     insulin degludec (TRESIBA) 200 UNIT/ML pen Inject 46 Units Subcutaneous daily 18 mL 3     blood glucose monitoring (NO BRAND SPECIFIED) test strip Use to test blood sugars 4 times daily or as directed please provide per formulary approval patient states Verio. 100 strip 3     NOVOLOG FLEXPEN 100 UNIT/ML soln INJECT 20 UNITS SUBCUTANEOUS 4 TIMES DAILY (WITH MEALS AND NIGHTLY) 15 mL 1     insulin glargine (LANTUS SOLOSTAR) 100 UNIT/ML PEN 23 units AM/ 23 units PM 1 Month 3     aspirin 81 MG tablet Take by mouth daily       Diphenhydramine-APAP, sleep, (TYLENOL PM EXTRA STRENGTH PO) Take by mouth At Bedtime       Allergies   Allergen Reactions     No Known Drug Allergies      Recent Labs   Lab Test  02/03/17   1047  11/15/16   1149  07/18/16   0951   03/18/16   0947  12/03/15   1021   02/05/15   0837  04/29/13   1138   05/17/12   0904   A1C  9.3*   --   7.6*   --   7.7*  6.7*   < >  7.0*  6.4*   < >  5.7   LDL   --    --    --    --    --   71   --   77  112   --   134*   HDL   --    --    --    --    --   28*   --   32*  35*   --   37*   TRIG   --    --    --    --    --   312*   --   287*  218*   --   167*   ALT   --   33   --    --    --    --    --   42  50   --   50   CR  0.62*   --   0.67   < >  0.77  0.60*   < >  0.64*  0.60*   --   0.81   GFRESTIMATED  >90  Non  GFR Calc     --   >90  Non  GFR Calc     < >  >90  Non  GFR Calc    >90  Non  GFR Calc     < >  >90  Non  GFR Calc    >90   --   >90   GFRESTBLACK  >90   GFR Calc     --   >90   GFR Calc     --   >90   GFR Calc    >90   GFR Calc     < >  >90   GFR Calc    >90   --   >90   POTASSIUM  4.6   --   4.0   < >  4.1  3.9   < >  4.0  4.1   --   4.7   TSH   --    --    --    --    --    --    --    --   1.25   --   1.68    < > = values in this interval  "not displayed.      BP Readings from Last 3 Encounters:   02/16/17 138/89   02/03/17 146/74   01/09/17 (!) 165/99    Wt Readings from Last 3 Encounters:   02/16/17 (!) 386 lb (175.1 kg)   02/03/17 (!) 387 lb (175.5 kg)   01/09/17 (!) 385 lb (174.6 kg)                    ROS:  Constitutional, HEENT, cardiovascular, pulmonary, gi and gu systems are negative, except as otherwise noted.    OBJECTIVE:                                                    /89 (Cuff Size: Adult Large)  Pulse 82  Temp 98  F (36.7  C) (Tympanic)  Ht 6' 7.5\" (2.019 m)  Wt (!) 386 lb (175.1 kg)  BMI 42.94 kg/m2  Body mass index is 42.94 kg/(m^2).  GENERAL: healthy, alert and no distress  NECK: no adenopathy, no asymmetry, masses, or scars and thyroid normal to palpation  RESP: lungs clear to auscultation - no rales, rhonchi or wheezes  CV: regular rate and rhythm, normal S1 S2, no S3 or S4, no murmur, click or rub, no peripheral edema and peripheral pulses strong  ABDOMEN: soft, nontender, no hepatosplenomegaly, no masses and bowel sounds normal  MS: no gross musculoskeletal defects noted, no edema         ASSESSMENT/PLAN:                                                    ASSESSMENT / PLAN:  (M17.10) Primary localized osteoarthrosis, lower leg, unspecified laterality  Comment: has been trying MS contin instead of OxyContin due to insurance coverage issue, and has been stable with mild intermittent constipation  Encouraged him to try fiber/stool softener for the sx, will keep him on Ms contin for now  Plan: morphine (MS CONTIN) 60 MG 12 hr tablet        If pain gets intolerable, will consider tramadol ER or Fentanyl patches as substitutes in future     F/u in 1 month for med check     Edison New MD  [facility] [place]  "

## 2017-02-16 NOTE — MR AVS SNAPSHOT
"              After Visit Summary   2017    Michael Colorado    MRN: [id number]           Patient Information     Date Of Birth          1955        Visit Information        Provider Department      2017 10:00 AM Edison New MD [facility] [place]        Today's Diagnoses     Primary localized osteoarthrosis, lower leg, unspecified laterality           Follow-ups after your visit        Who to contact     If you have questions or need follow up information about today's clinic visit or your schedule please contact [facility] [place] directly at [phone].  Normal or non-critical lab and imaging results will be communicated to you by Remotiumhart, letter or phone within 4 business days after the clinic has received the results. If you do not hear from us within 7 days, please contact the clinic through Remotiumhart or phone. If you have a critical or abnormal lab result, we will notify you by phone as soon as possible.  Submit refill requests through YYoga or call your pharmacy and they will forward the refill request to us. Please allow 3 business days for your refill to be completed.          Additional Information About Your Visit        MyChart Information     YYoga lets you send messages to your doctor, view your test results, renew your prescriptions, schedule appointments and more. To sign up, go to [email or web address] . Click on \"Log in\" on the left side of the screen, which will take you to the Welcome page. Then click on \"Sign up Now\" on the right side of the page.     You will be asked to enter the access code listed below, as well as some personal information. Please follow the directions to create your username and password.     Your access code is: [email or web address]  Expires: 2017 10:19 AM     Your access code will  in 90 days. If you need help or a new code, please call your [facility] or [phone].        Care EveryWhere ID     This is your Care " "EveryWhere ID. This could be used by other organizations to access your [place] medical records  [id number]        Your Vitals Were     Pulse Temperature Height BMI (Body Mass Index)          82 98  F (36.7  C) (Tympanic) 6' 7.5\" (2.019 m) 42.94 kg/m2         Blood Pressure from Last 3 Encounters:   02/16/17 138/89   02/03/17 146/74   01/09/17 (!) 165/99    Weight from Last 3 Encounters:   02/16/17 (!) 386 lb (175.1 kg)   02/03/17 (!) 387 lb (175.5 kg)   01/09/17 (!) 385 lb (174.6 kg)              Today, you had the following     No orders found for display         Today's Medication Changes          These changes are accurate as of: 2/16/17 10:19 AM.  If you have any questions, ask your nurse or doctor.               These medicines have changed or have updated prescriptions.        Dose/Directions    morphine 60 MG 12 hr tablet   Commonly known as:  MS CONTIN   This may have changed:  when to take this   Used for:  Primary localized osteoarthrosis, lower leg, unspecified laterality   Changed by:  Edison New MD        Dose:  60 mg   Start taking on:  2/18/2017   Take 1 tablet (60 mg) by mouth every 12 hours   Quantity:  60 tablet   Refills:  0         Stop taking these medicines if you haven't already. Please contact your care team if you have questions.     oxyCODONE 20 MG 12 hr tablet   Commonly known as:  OXYCONTIN   Stopped by:  Edison New MD           oxyCODONE 40 MG 12 hr tablet   Commonly known as:  OxyCONTIN   Stopped by:  Edison New MD                Where to get your medicines      Some of these will need a paper prescription and others can be bought over the counter.  Ask your nurse if you have questions.     Bring a paper prescription for each of these medications     morphine 60 MG 12 hr tablet                Primary Care Provider Office Phone # Fax #    Edison New [phone]       [address] [address]        Thank you!     " Thank you for choosing [facility] [place]  for your care. Our goal is always to provide you with excellent care. Hearing back from our patients is one way we can continue to improve our services. Please take a few minutes to complete the written survey that you may receive in the mail after your visit with us. Thank you!             Your Updated Medication List - Protect others around you: Learn how to safely use, store and throw away your medicines at www.disposemymeds.org.          This list is accurate as of: 2/16/17 10:19 AM.  Always use your most recent med list.                   Brand Name Dispense Instructions for use    aspirin 81 MG tablet      Take by mouth daily       blood glucose monitoring test strip    no brand specified    100 strip    Use to test blood sugars 4 times daily or as directed please provide per formulary approval patient states Verio.       HYDROcodone-acetaminophen 7.5-325 MG per tablet    NORCO    60 tablet    Take 1 tablet by mouth 2 times daily       insulin degludec 200 UNIT/ML pen    TRESIBA    18 mL    Inject 46 Units Subcutaneous daily       insulin glargine 100 UNIT/ML injection    LANTUS SOLOSTAR    1 Month    23 units AM/ 23 units PM       insulin lispro 100 UNIT/ML injection    HumaLOG KWIKpen    15 mL    20 units before breakfast, 20 units before lunch, 20 units before dinner       insulin pen needle 32G X 4 MM    BD SHANELL U/F    200 each    Inject Subcutaneous 4 times daily Use 4 pen needles daily or as directed.       metFORMIN 500 MG tablet    GLUCOPHAGE    120 tablet    Take 2 tablets (1,000 mg) by mouth 2 times daily (with meals)       morphine 60 MG 12 hr tablet   Start taking on:  2/18/2017    MS CONTIN    60 tablet    Take 1 tablet (60 mg) by mouth every 12 hours       NovoLOG FLEXPEN 100 UNIT/ML injection   Generic drug:  insulin aspart     15 mL    INJECT 20 UNITS SUBCUTANEOUS 4 TIMES DAILY (WITH MEALS AND NIGHTLY)       TYLENOL PM EXTRA STRENGTH PO       Take by mouth At Bedtime

## 2017-02-24 ENCOUNTER — OFFICE VISIT (OUTPATIENT)
Dept: FAMILY MEDICINE | Facility: CLINIC | Age: 62
End: 2017-02-24
Payer: COMMERCIAL

## 2017-02-24 VITALS
SYSTOLIC BLOOD PRESSURE: 136 MMHG | DIASTOLIC BLOOD PRESSURE: 89 MMHG | OXYGEN SATURATION: 99 % | TEMPERATURE: 97.1 F | BODY MASS INDEX: 42.61 KG/M2 | WEIGHT: 315 LBS | HEART RATE: 93 BPM

## 2017-02-24 DIAGNOSIS — M17.10 PRIMARY LOCALIZED OSTEOARTHROSIS, LOWER LEG, UNSPECIFIED LATERALITY: ICD-10-CM

## 2017-02-24 PROCEDURE — 99213 OFFICE O/P EST LOW 20 MIN: CPT | Performed by: FAMILY MEDICINE

## 2017-02-24 RX ORDER — MORPHINE SULFATE 60 MG/1
60 TABLET, FILM COATED, EXTENDED RELEASE ORAL EVERY 12 HOURS
Qty: 60 TABLET | Refills: 0 | Status: SHIPPED | OUTPATIENT
Start: 2017-02-27 | End: 2017-03-23

## 2017-02-24 NOTE — PROGRESS NOTES
SUBJECTIVE:                                                    Michael Colorado is a 62 year old male who presents to clinic today for the following health issues:      Medication Followup of  Morphine     Taking Medication as prescribed: yes    Side Effects:  None    Medication Helping Symptoms:  yes       Problem list and histories reviewed & adjusted, as indicated.  Additional history: as documented    Patient Active Problem List   Diagnosis     Other and unspecified disc disorder     Tinnitus     CARDIOVASCULAR SCREENING; LDL GOAL LESS THAN 130     Advanced directives, counseling/discussion     Primary localized osteoarthrosis, lower leg     Smoker     Morbid obesity with BMI of 45.0-49.9, adult (H)     Morbid obesity (H)     Chronic pain syndrome     Hepatitis C     Obstructive sleep apnea     Tobacco use disorder     Type 2 diabetes mellitus with hyperglycemia (H)     Past Surgical History   Procedure Laterality Date     Surgical history of -   10/03     Lt knee partial medial meniscectomy       Social History   Substance Use Topics     Smoking status: Current Every Day Smoker     Packs/day: 1.00     Years: 30.00     Types: Cigarettes     Smokeless tobacco: Never Used     Alcohol use No      Comment: quit      Family History   Problem Relation Age of Onset     DIABETES Brother          Current Outpatient Prescriptions   Medication Sig Dispense Refill     [START ON 2/27/2017] morphine (MS CONTIN) 60 MG 12 hr tablet Take 1 tablet (60 mg) by mouth every 12 hours 60 tablet 0     HYDROcodone-acetaminophen (NORCO) 7.5-325 MG per tablet Take 1 tablet by mouth 2 times daily 60 tablet 0     insulin pen needle (BD SHANELL U/F) 32G X 4 MM Inject Subcutaneous 4 times daily Use 4 pen needles daily or as directed. 200 each 5     metFORMIN (GLUCOPHAGE) 500 MG tablet Take 2 tablets (1,000 mg) by mouth 2 times daily (with meals) 120 tablet 5     insulin degludec (TRESIBA) 200 UNIT/ML pen Inject 46 Units Subcutaneous daily 18 mL 3      blood glucose monitoring (NO BRAND SPECIFIED) test strip Use to test blood sugars 4 times daily or as directed please provide per formulary approval patient states Verio. 100 strip 3     NOVOLOG FLEXPEN 100 UNIT/ML soln INJECT 20 UNITS SUBCUTANEOUS 4 TIMES DAILY (WITH MEALS AND NIGHTLY) 15 mL 1     Diphenhydramine-APAP, sleep, (TYLENOL PM EXTRA STRENGTH PO) Take by mouth At Bedtime       aspirin 81 MG tablet Take by mouth daily       Allergies   Allergen Reactions     No Known Drug Allergies      Recent Labs   Lab Test  02/03/17   1047  11/15/16   1149  07/18/16   0951   03/18/16   0947  12/03/15   1021   02/05/15   0837  04/29/13   1138   05/17/12   0904   A1C  9.3*   --   7.6*   --   7.7*  6.7*   < >  7.0*  6.4*   < >  5.7   LDL   --    --    --    --    --   71   --   77  112   --   134*   HDL   --    --    --    --    --   28*   --   32*  35*   --   37*   TRIG   --    --    --    --    --   312*   --   287*  218*   --   167*   ALT   --   33   --    --    --    --    --   42  50   --   50   CR  0.62*   --   0.67   < >  0.77  0.60*   < >  0.64*  0.60*   --   0.81   GFRESTIMATED  >90  Non  GFR Calc     --   >90  Non  GFR Calc     < >  >90  Non  GFR Calc    >90  Non  GFR Calc     < >  >90  Non  GFR Calc    >90   --   >90   GFRESTBLACK  >90   GFR Calc     --   >90   GFR Calc     --   >90   GFR Calc    >90   GFR Calc     < >  >90   GFR Calc    >90   --   >90   POTASSIUM  4.6   --   4.0   < >  4.1  3.9   < >  4.0  4.1   --   4.7   TSH   --    --    --    --    --    --    --    --   1.25   --   1.68    < > = values in this interval not displayed.      BP Readings from Last 3 Encounters:   02/24/17 136/89   02/16/17 138/89   02/03/17 146/74    Wt Readings from Last 3 Encounters:   02/24/17 (!) 383 lb (173.7 kg)   02/16/17 (!) 386 lb (175.1 kg)   02/03/17 (!)  387 lb (175.5 kg)                    ROS:  Constitutional, HEENT, cardiovascular, pulmonary, gi and gu systems are negative, except as otherwise noted.    OBJECTIVE:                                                    /89  Pulse 93  Temp 97.1  F (36.2  C) (Oral)  Wt (!) 383 lb (173.7 kg)  SpO2 99%  BMI 42.61 kg/m2  Body mass index is 42.61 kg/(m^2).  GENERAL: healthy, alert and no distress  NECK: no adenopathy, no asymmetry, masses, or scars and thyroid normal to palpation  RESP: lungs clear to auscultation - no rales, rhonchi or wheezes  CV: regular rate and rhythm, normal S1 S2, no S3 or S4, no murmur, click or rub, no peripheral edema and peripheral pulses strong  ABDOMEN: soft, nontender, no hepatosplenomegaly, no masses and bowel sounds normal  MS: no gross musculoskeletal defects noted, no edema         ASSESSMENT/PLAN:                                                    Michael was seen today for recheck medication.    Diagnoses and all orders for this visit:    Primary localized osteoarthrosis, lower leg, unspecified laterality  -     morphine (MS CONTIN) 60 MG 12 hr tablet; Take 1 tablet (60 mg) by mouth every 12 hours    Other orders  -     Cancel: DIABETES EDUCATOR REFERRAL  -     Cancel: Lipid panel reflex to direct LDL  -     Cancel: Albumin Random Urine Quantitative  -     Cancel: TSH WITH FREE T4 REFLEX      Limited amount, pharmacy did dispense only 18 tablets, will have him to use different pharmay with complete month supplies at next refill         Edison New MD  [facility] [place]

## 2017-02-24 NOTE — MR AVS SNAPSHOT
"              After Visit Summary   2017    Michael Colorado    MRN: [id number]           Patient Information     Date Of Birth          1955        Visit Information        Provider Department      2017 11:00 AM Edison New MD [facility] [place]        Today's Diagnoses     Primary localized osteoarthrosis, lower leg, unspecified laterality           Follow-ups after your visit        Who to contact     If you have questions or need follow up information about today's clinic visit or your schedule please contact [facility] [place] directly at [phone].  Normal or non-critical lab and imaging results will be communicated to you by Scrip-thart, letter or phone within 4 business days after the clinic has received the results. If you do not hear from us within 7 days, please contact the clinic through Scrip-thart or phone. If you have a critical or abnormal lab result, we will notify you by phone as soon as possible.  Submit refill requests through Centaur or call your pharmacy and they will forward the refill request to us. Please allow 3 business days for your refill to be completed.          Additional Information About Your Visit        MyChart Information     Centaur lets you send messages to your doctor, view your test results, renew your prescriptions, schedule appointments and more. To sign up, go to [email or web address] . Click on \"Log in\" on the left side of the screen, which will take you to the Welcome page. Then click on \"Sign up Now\" on the right side of the page.     You will be asked to enter the access code listed below, as well as some personal information. Please follow the directions to create your username and password.     Your access code is: [email or web address]  Expires: 2017 10:19 AM     Your access code will  in 90 days. If you need help or a new code, please call your [facility] or [phone].        Care EveryWhere ID     This is your Care " EveryWhere ID. This could be used by other organizations to access your [place] medical records  [id number]        Your Vitals Were     Pulse Temperature Pulse Oximetry BMI (Body Mass Index)          93 97.1  F (36.2  C) (Oral) 99% 42.61 kg/m2         Blood Pressure from Last 3 Encounters:   02/24/17 136/89   02/16/17 138/89   02/03/17 146/74    Weight from Last 3 Encounters:   02/24/17 (!) 383 lb (173.7 kg)   02/16/17 (!) 386 lb (175.1 kg)   02/03/17 (!) 387 lb (175.5 kg)              Today, you had the following     No orders found for display         Today's Medication Changes          These changes are accurate as of: 2/24/17 11:36 AM.  If you have any questions, ask your nurse or doctor.               Stop taking these medicines if you haven't already. Please contact your care team if you have questions.     insulin glargine 100 UNIT/ML injection   Commonly known as:  LANTUS SOLOSTAR   Stopped by:  Edison New MD                Where to get your medicines      Some of these will need a paper prescription and others can be bought over the counter.  Ask your nurse if you have questions.     Bring a paper prescription for each of these medications     morphine 60 MG 12 hr tablet                Primary Care Provider Office Phone # Fax #    Edison New [phone]       [address] [address]        Thank you!     Thank you for choosing [facility] [place]  for your care. Our goal is always to provide you with excellent care. Hearing back from our patients is one way we can continue to improve our services. Please take a few minutes to complete the written survey that you may receive in the mail after your visit with us. Thank you!             Your Updated Medication List - Protect others around you: Learn how to safely use, store and throw away your medicines at www.disposemymeds.org.          This list is accurate as of: 2/24/17 11:36  AM.  Always use your most recent med list.                   Brand Name Dispense Instructions for use    aspirin 81 MG tablet      Take by mouth daily       blood glucose monitoring test strip    no brand specified    100 strip    Use to test blood sugars 4 times daily or as directed please provide per formulary approval patient states Verio.       HYDROcodone-acetaminophen 7.5-325 MG per tablet    NORCO    60 tablet    Take 1 tablet by mouth 2 times daily       insulin degludec 200 UNIT/ML pen    TRESIBA    18 mL    Inject 46 Units Subcutaneous daily       insulin pen needle 32G X 4 MM    BD SHANELL U/F    200 each    Inject Subcutaneous 4 times daily Use 4 pen needles daily or as directed.       metFORMIN 500 MG tablet    GLUCOPHAGE    120 tablet    Take 2 tablets (1,000 mg) by mouth 2 times daily (with meals)       morphine 60 MG 12 hr tablet   Start taking on:  2/27/2017    MS CONTIN    60 tablet    Take 1 tablet (60 mg) by mouth every 12 hours       NovoLOG FLEXPEN 100 UNIT/ML injection   Generic drug:  insulin aspart     15 mL    INJECT 20 UNITS SUBCUTANEOUS 4 TIMES DAILY (WITH MEALS AND NIGHTLY)       TYLENOL PM EXTRA STRENGTH PO      Take by mouth At Bedtime

## 2017-02-24 NOTE — NURSING NOTE
"Chief Complaint   Patient presents with     Recheck Medication       Initial /86 (BP Location: Left arm, Cuff Size: Adult Large)  Pulse 93  Temp 97.1  F (36.2  C) (Oral)  Wt (!) 383 lb (173.7 kg)  SpO2 99%  BMI 42.61 kg/m2 Estimated body mass index is 42.61 kg/(m^2) as calculated from the following:    Height as of 2/16/17: 6' 7.5\" (2.019 m).    Weight as of this encounter: 383 lb (173.7 kg).  Medication Reconciliation: complete Nahomi Loera MA     "

## 2017-03-10 DIAGNOSIS — M50.30 DDD (DEGENERATIVE DISC DISEASE), CERVICAL: ICD-10-CM

## 2017-03-10 RX ORDER — HYDROCODONE BITARTRATE AND ACETAMINOPHEN 7.5; 325 MG/1; MG/1
1 TABLET ORAL 2 TIMES DAILY
Qty: 60 TABLET | Refills: 0 | Status: SHIPPED | OUTPATIENT
Start: 2017-03-15 | End: 2017-04-11

## 2017-03-10 NOTE — TELEPHONE ENCOUNTER
RX [place] reviewed:  reviewed- no concerns    [place]:  [email or web address]    Marina Carpio RN  [facility]  [phone]

## 2017-03-10 NOTE — TELEPHONE ENCOUNTER
Controlled Substance Refill Request for HYDROcodone-acetaminophen (NORCO) 7.5-325 MG per tablet  Problem List Complete:  No     PROVIDER TO CONSIDER COMPLETION OF PROBLEM LIST AND OVERVIEW/CONTROLLED SUBSTANCE AGREEMENT    Last Written Prescription Date:  2/14/17  Last Fill Quantity: 60,   # refills: 0    [place] [facility]24/17    Future Office visit:     Controlled substance agreement on file: Yes:  Date 6/17/16.     Processing:  Patient will  in clinic   checked in past 6 months?  No, route to RN     Martha MOSLEY

## 2017-03-23 DIAGNOSIS — M17.10 PRIMARY LOCALIZED OSTEOARTHROSIS, LOWER LEG, UNSPECIFIED LATERALITY: ICD-10-CM

## 2017-03-23 RX ORDER — MORPHINE SULFATE 60 MG/1
60 TABLET, FILM COATED, EXTENDED RELEASE ORAL EVERY 12 HOURS
Qty: 60 TABLET | Refills: 0 | Status: SHIPPED | OUTPATIENT
Start: 2017-03-28 | End: 2017-04-24

## 2017-03-23 NOTE — TELEPHONE ENCOUNTER
Patient will  hard copy when ready [phone]      Controlled Substance Refill Request for morphine (MS CONTIN) 60 MG 12 hr tablet  Problem List Complete:  No     PROVIDER TO CONSIDER COMPLETION OF PROBLEM LIST AND OVERVIEW/CONTROLLED SUBSTANCE AGREEMENT    Last Written Prescription Date:  2/27/17  Last Fill Quantity: 60,   # refills: 0    Last Office Visit with [facility] primary care provider: 2/24/17    Future Office visit:     Controlled substance agreement on file: Yes:  Date 6/22/16.     Processing:  Patient will  in clinic   checked in past 6 months?  Yes 3/10/17     Martha MOSLEY

## 2017-04-11 DIAGNOSIS — M50.30 DDD (DEGENERATIVE DISC DISEASE), CERVICAL: ICD-10-CM

## 2017-04-11 RX ORDER — HYDROCODONE BITARTRATE AND ACETAMINOPHEN 7.5; 325 MG/1; MG/1
1 TABLET ORAL 2 TIMES DAILY
Qty: 60 TABLET | Refills: 0 | Status: SHIPPED | OUTPATIENT
Start: 2017-04-14 | End: 2017-05-10

## 2017-04-11 NOTE — TELEPHONE ENCOUNTER
Patient will  hard copy when ready [phone]    Controlled Substance Refill Request for HYDROcodone-acetaminophen (NORCO) 7.5-325 MG per tablet  Problem List Complete:  No     PROVIDER TO CONSIDER COMPLETION OF PROBLEM LIST AND OVERVIEW/CONTROLLED SUBSTANCE AGREEMENT    Last Written Prescription Date:  3/15/17  Last Fill Quantity: 60,   # refills:0    Last Office Visit with [facility] [place] care provider: 2/24/17    Future Office visit:     Controlled substance agreement on file: Yes:  Date 6/17/16.     Processing:  Patient will  in clinic   checked in past 6 months?  Yes 3/10/17       Martha MOSLEY

## 2017-04-24 DIAGNOSIS — M17.10 PRIMARY LOCALIZED OSTEOARTHROSIS, LOWER LEG, UNSPECIFIED LATERALITY: ICD-10-CM

## 2017-04-24 NOTE — TELEPHONE ENCOUNTER
Name of caller: RADHA  Relationship to Patient: SELF    Reason for Call:  REFILL MORPHINE    Best phone number to reach pt at is: HOME [phone]  Ok to leave a message with medical info? YES    Pharmacy preferred (if calling for a refill): [facility]

## 2017-04-26 RX ORDER — MORPHINE SULFATE 60 MG/1
60 TABLET, FILM COATED, EXTENDED RELEASE ORAL EVERY 12 HOURS
Qty: 60 TABLET | Refills: 0 | Status: SHIPPED | OUTPATIENT
Start: 2017-04-27 | End: 2017-05-23

## 2017-04-26 NOTE — TELEPHONE ENCOUNTER
Controlled Substance Refill Request for Morphone  Problem List Complete:  No     PROVIDER TO CONSIDER COMPLETION OF PROBLEM LIST AND OVERVIEW/CONTROLLED SUBSTANCE AGREEMENT    Last Written Prescription Date:  3/28/2017  Last Fill Quantity: 60,   # refills: 0    [place] [facility] [place]: 2/24/2017    Future Office visit:     Controlled substance agreement on file: Yes:  Date 6/17/16.     Processing:  Patient will  in clinic   checked in past 6 months?  Yes 3/10/17

## 2017-05-10 ENCOUNTER — TELEPHONE (OUTPATIENT)
Dept: FAMILY MEDICINE | Facility: CLINIC | Age: 62
End: 2017-05-10

## 2017-05-10 DIAGNOSIS — M50.30 DDD (DEGENERATIVE DISC DISEASE), CERVICAL: ICD-10-CM

## 2017-05-10 RX ORDER — HYDROCODONE BITARTRATE AND ACETAMINOPHEN 7.5; 325 MG/1; MG/1
1 TABLET ORAL 2 TIMES DAILY
Qty: 60 TABLET | Refills: 0 | Status: SHIPPED | OUTPATIENT
Start: 2017-05-14 | End: 2017-06-12

## 2017-05-10 NOTE — TELEPHONE ENCOUNTER
Name of caller: RADHA  Relationship to Patient: SELF    Reason for Call:  REFILL  HYDROCODONE    Best phone number to reach pt at is: [phone]  Ok to leave a message with medical info? YES    Pharmacy preferred (if calling for a refill): [facility]

## 2017-05-10 NOTE — TELEPHONE ENCOUNTER
Controlled Substance Refill Request for hydrocodone  Problem List Complete:  No     PROVIDER TO CONSIDER COMPLETION OF PROBLEM LIST AND OVERVIEW/CONTROLLED SUBSTANCE AGREEMENT    Last Written Prescription Date:  4/14/2017  Last Fill Quantity: 60,   # refills: 0    [place] [facility] [place]: 2/24/2017    Future Office visit:     Controlled substance agreement on file: Yes:  Date 6/17/2016.     Processing:  Patient will  in clinic   checked in past 6 months?  Yes 3/10/2017

## 2017-05-23 DIAGNOSIS — M17.10 PRIMARY LOCALIZED OSTEOARTHROSIS, LOWER LEG, UNSPECIFIED LATERALITY: ICD-10-CM

## 2017-05-23 NOTE — TELEPHONE ENCOUNTER
Pt will  the hard copy at the  and notify him at [phone]    Controlled Substance Refill Request for Morphone  Problem List Complete:  No     PROVIDER TO CONSIDER COMPLETION OF PROBLEM LIST AND OVERVIEW/CONTROLLED SUBSTANCE AGREEMENT    Last Written Prescription Date:  4/27/2017  Last Fill Quantity: 60,   # refills: 0    Last Office Visit with [facility] primary care provider: 2/24/2017    Future Office visit:     Controlled substance agreement on file: Yes:  Date 6/17/2016.     Processing:  Patient will  in clinic   checked in past 6 months?  Yes 3/10/2017

## 2017-05-24 RX ORDER — MORPHINE SULFATE 60 MG/1
60 TABLET, FILM COATED, EXTENDED RELEASE ORAL EVERY 12 HOURS
Qty: 60 TABLET | Refills: 0 | Status: SHIPPED | OUTPATIENT
Start: 2017-05-27 | End: 2017-06-12

## 2017-06-12 DIAGNOSIS — M17.10 PRIMARY LOCALIZED OSTEOARTHROSIS, LOWER LEG, UNSPECIFIED LATERALITY: ICD-10-CM

## 2017-06-12 DIAGNOSIS — M50.30 DDD (DEGENERATIVE DISC DISEASE), CERVICAL: ICD-10-CM

## 2017-06-12 RX ORDER — HYDROCODONE BITARTRATE AND ACETAMINOPHEN 7.5; 325 MG/1; MG/1
1 TABLET ORAL 2 TIMES DAILY
Qty: 60 TABLET | Refills: 0 | Status: SHIPPED | OUTPATIENT
Start: 2017-06-13 | End: 2017-07-12

## 2017-06-12 RX ORDER — MORPHINE SULFATE 60 MG/1
60 TABLET, FILM COATED, EXTENDED RELEASE ORAL EVERY 12 HOURS
Qty: 60 TABLET | Refills: 0 | Status: SHIPPED | OUTPATIENT
Start: 2017-06-26 | End: 2017-07-12

## 2017-06-12 NOTE — TELEPHONE ENCOUNTER
Patient needs refills on his morphine (MS CONTIN) 60 MG 12 hr tablet (knows this one is early, but would like to pick them up at the same time) [phone]    and HYDROcodone-acetaminophen (NORCO) 7.5-325 MG per tablet      Will  hard copies when ready [phone]      (MS CONTIN) 60 MG 12 hr tablet      Last Written Prescription Date:  5/27/17  Last Fill Quantity: 60,   # refills: 0  [place] [facility] [facility] or [facility] prescribing provider: 2/24/17  Future Office visit:       Routing refill request to provider for review/approval because:  Drug not on the [facility], [facility] or [facility] refill protocol or controlled substance    Controlled Substance Refill Request for HYDROcodone-acetaminophen (NORCO) 7.5-325 MG per tablet  Problem List Complete:  No     PROVIDER TO CONSIDER COMPLETION OF PROBLEM LIST AND OVERVIEW/CONTROLLED SUBSTANCE AGREEMENT    Last Written Prescription Date:  5/14/17  Last Fill Quantity: 60,   # refills: 0    Last Office Visit [place] [facility] [place]: 2/24/17    Future Office visit:     Controlled substance agreement on file: Yes:  Date 6/17/16.     Processing:  Patient will  in clinic   checked in past 6 months?  Yes 3/10/17     Martha MOSLEY

## 2017-07-10 DIAGNOSIS — M17.10 PRIMARY LOCALIZED OSTEOARTHROSIS, LOWER LEG, UNSPECIFIED LATERALITY: ICD-10-CM

## 2017-07-10 DIAGNOSIS — M50.30 DDD (DEGENERATIVE DISC DISEASE), CERVICAL: ICD-10-CM

## 2017-07-10 NOTE — TELEPHONE ENCOUNTER
Reason for Call:  Medication or medication refill:    Do you use a [facility]?  Name of the pharmacy and phone number for the current request:   at clinic    Name of the medication requested: HYDROcodone-acetaminophen (NORCO) 7.5-325 MG per tablet, morphine (MS CONTIN) 60 MG 12 hr tablet    Other request: NA    Can we leave a detailed message on this number? YES    Phone number patient can be reached at: Home number on file [phone] (home)    Best Time: any    Call taken on 7/10/2017 at 3:47 PM by Antonia Ospina

## 2017-07-11 RX ORDER — MORPHINE SULFATE 60 MG/1
60 TABLET, FILM COATED, EXTENDED RELEASE ORAL EVERY 12 HOURS
Qty: 60 TABLET | Refills: 0 | Status: CANCELLED | OUTPATIENT
Start: 2017-07-11

## 2017-07-11 RX ORDER — HYDROCODONE BITARTRATE AND ACETAMINOPHEN 7.5; 325 MG/1; MG/1
1 TABLET ORAL 2 TIMES DAILY
Qty: 60 TABLET | Refills: 0 | Status: CANCELLED | OUTPATIENT
Start: 2017-07-11

## 2017-07-11 NOTE — TELEPHONE ENCOUNTER
Controlled Substance Refill Request for MS Contin  Problem List Complete:  No     PROVIDER TO CONSIDER COMPLETION OF PROBLEM LIST AND OVERVIEW/CONTROLLED SUBSTANCE AGREEMENT    Last Written Prescription Date:  6/26/2017  Last Fill Quantity: 60,   # refills: 0    [place] [facility] [place]: 2/24/2017    Future Office visit:     Controlled substance agreement on file: Yes:  Date 6/22/2016.     Processing:  Patient will  in clinic   checked in past 6 months?  Yes 3/10/2017    Norco  Last Written Prescription Date:  6/13/2017  Last Fill Quantity: 60,   # refills: 0  Last [place] [facility] [facility] or [facility] prescribing provider: 2/24/2017  Future Office visit:       Routing refill request to provider for review/approval because:  Drug not on the [facility], [facility] or [facility] [facility] refill protocol or controlled substance

## 2017-07-12 RX ORDER — HYDROCODONE BITARTRATE AND ACETAMINOPHEN 7.5; 325 MG/1; MG/1
1 TABLET ORAL 2 TIMES DAILY
Qty: 60 TABLET | Refills: 0 | Status: SHIPPED | OUTPATIENT
Start: 2017-07-13 | End: 2018-01-09

## 2017-07-12 RX ORDER — MORPHINE SULFATE 60 MG/1
60 TABLET, FILM COATED, EXTENDED RELEASE ORAL EVERY 12 HOURS
Qty: 60 TABLET | Refills: 0 | Status: SHIPPED | OUTPATIENT
Start: 2017-07-26 | End: 2018-01-09

## 2017-07-12 NOTE — TELEPHONE ENCOUNTER
Pt has not heard back about his insurance yet so is not able to make an appt at this time. He is requesting a 1 month refill. Thank you.  Jacquelin Rousseau,

## 2017-07-20 ENCOUNTER — TELEPHONE (OUTPATIENT)
Dept: FAMILY MEDICINE | Facility: CLINIC | Age: 62
End: 2017-07-20

## 2017-07-20 DIAGNOSIS — E11.65 POORLY CONTROLLED TYPE 2 DIABETES MELLITUS (H): Primary | ICD-10-CM

## 2017-07-20 RX ORDER — LANCETS
EACH MISCELLANEOUS
Qty: 102 EACH | Refills: 3 | Status: SHIPPED | OUTPATIENT
Start: 2017-07-20 | End: 2018-01-12 | Stop reason: ALTCHOICE

## 2017-07-20 RX ORDER — BLOOD-GLUCOSE METER
EACH MISCELLANEOUS
Qty: 1 KIT | Refills: 0 | Status: SHIPPED | OUTPATIENT
Start: 2017-07-20 | End: 2018-01-12 | Stop reason: ALTCHOICE

## 2017-07-20 NOTE — TELEPHONE ENCOUNTER
One Touch Verio Test [address] is not covered. Would you like to start a PA or alternative Rx? Roseann Hampton, [facility]    Phone # [phone]  ID # [id number]

## 2017-07-20 NOTE — TELEPHONE ENCOUNTER
Accu Chek Anastacia is covered. Please send Rx for new device and lancets. Thank you. Roseann Hampton, CMA

## 2017-07-20 NOTE — TELEPHONE ENCOUNTER
Check on with pharmacy to see what device is in the formulary, then it can be changed to the formulary   thx

## 2017-07-21 NOTE — TELEPHONE ENCOUNTER
Received a message from pharmacy. Could you please send an order for test strips as well. Thank you. Roseann Hampton, CMA

## 2017-10-01 ENCOUNTER — HOSPITAL ENCOUNTER (EMERGENCY)
Facility: CLINIC | Age: 62
Discharge: HOME | End: 2017-10-01
Attending: EMERGENCY MEDICINE | Admitting: EMERGENCY MEDICINE
Payer: COMMERCIAL

## 2017-10-01 VITALS
WEIGHT: 315 LBS | HEIGHT: 78 IN | TEMPERATURE: 98.4 F | BODY MASS INDEX: 36.45 KG/M2 | DIASTOLIC BLOOD PRESSURE: 85 MMHG | OXYGEN SATURATION: 93 % | RESPIRATION RATE: 16 BRPM | SYSTOLIC BLOOD PRESSURE: 143 MMHG

## 2017-10-01 DIAGNOSIS — K42.9 UMBILICAL HERNIA WITHOUT OBSTRUCTION AND WITHOUT GANGRENE: ICD-10-CM

## 2017-10-01 PROCEDURE — 99283 EMERGENCY DEPT VISIT LOW MDM: CPT

## 2017-10-01 RX ORDER — HYDROMORPHONE HYDROCHLORIDE 1 MG/ML
INJECTION, SOLUTION INTRAMUSCULAR; INTRAVENOUS; SUBCUTANEOUS
Status: DISCONTINUED
Start: 2017-10-01 | End: 2017-10-01 | Stop reason: WASHOUT

## 2017-10-01 ASSESSMENT — ENCOUNTER SYMPTOMS
VOMITING: 1
NAUSEA: 1
ABDOMINAL PAIN: 1

## 2017-10-01 NOTE — ED NOTES
Bed: ED01  Expected date:   Expected time:   Means of arrival:   Comments:  [address] Hernia pain 62 male

## 2017-10-01 NOTE — ED AVS SNAPSHOT
Emergency Department    [address] [place] [id number]    Phone:  [phone]    Fax:  [phone]                                       Michael Colorado   MRN: [id number]    Department:   Emergency Department   Date of Visit:  10/1/2017           After Visit Summary Signature Page     I have received my discharge instructions, and my questions have been answered. I have discussed any challenges I see with this plan with the nurse or doctor.    ..........................................................................................................................................  Patient/Patient Representative Signature      ..........................................................................................................................................  Patient Representative Print Name and Relationship to Patient    ..................................................               ................................................  Date                                            Time    ..........................................................................................................................................  Reviewed by Signature/Title    ...................................................              ..............................................  Date                                                            Time

## 2017-10-01 NOTE — ED PROVIDER NOTES
"  History     Chief Complaint:  Abdominal Pain     HPI   Michael Colorado is a 62 year old male with a history of type II diabetes mellitus who presents via EMS for evaluation of abdominal pain. The patient reports a longstanding history of an umbilical hernia. About four hours ago, the patient became unable to reduce the hernia on his own and he has developed severe abdominal pain, nausea, and vomiting, prompting him to call EMS to bring him into the ED for evaluation. He was provided 100 mcg of Fentanyl and 4 mg of Zofran prior to arrival in the ED with some improvement of his pain. He has previously been advised that he will likely require surgery for this issue, but he has yet to consult with a surgeon. He has self reduced it many times at home.    Allergies:  NKDA      Medications:    HYDROcodone-acetaminophen (NORCO) 7.5-325 MG per tablet  morphine (MS CONTIN) 60 MG 12 hr tablet  metFORMIN (GLUCOPHAGE) 500 MG tablet  insulin degludec (TRESIBA) 200 UNIT/ML pen  NOVOLOG FLEXPEN 100 UNIT/ML soln  aspirin 81 MG tablet  Diphenhydramine-APAP, sleep, (TYLENOL PM EXTRA STRENGTH PO)    Past Medical History:    Diabetes mellitus, type II  Hepatitis C  Chronic pain syndrome   Morbid obesity   Osteoarthrosis     Past Surgical History:    Left knee partial medial meniscectomy     Family History:    Diabetes - Brother     Social History:  Tobacco use:    Current every day smoker - 1.00 packs / day for 30 years   Alcohol use:    Negative  Marital status:       Accompanied to ED by:  EMS      Review of Systems   Gastrointestinal: Positive for abdominal pain, nausea and vomiting.   All other systems reviewed and are negative.    Physical Exam   First Vitals:  BP: 143/85  Temp: 98.4  F (36.9  C)  Temp src: Oral  Resp: 16  SpO2: 93 %  Height: 203.2 cm (6' 8\")  Weight: 158.8 kg (350 lb) (10/01 1510)     Physical Exam  Physical Exam   General:  Sitting on bed.   HENT:  No obvious trauma to head  Right Ear:  External ear " normal.   Left Ear:  External ear normal.   Nose:  Nose normal.   Eyes:  Conjunctivae and EOM are normal.  Neck: Normal range of motion. Neck supple. No tracheal deviation present.   Pulm/Chest: No respiratory distress  Abdominal: Umbilical hernia present that is palpable and tender. No other abdominal tenderness, guarding, or rigidity.   M/S: Normal range of motion.   Neuro: Alert. GCS 15.  Skin: Skin is warm and dry. No rash noted. Not diaphoretic.   Psych: Normal mood and affect. Behavior is normal.     Emergency Department Course     Procedures:  Procedure Note - Bedside Hernia Reduction  Performed by: Mahin Myers DO   After verbal consent was obtained, the patient was placed in Trendelenburg position.  I then applied steady direct manual pressure to the patient's tender hernia, and was able to successfully and fully reduce the hernia with subsequent symptomatic improvement.  The patient tolerated the procedure relatively well and there were no apparent complications.    Emergency Department Course:  Patient was brought to the ED via EMS.     Nursing notes and vitals reviewed.  1509: I performed an exam of the patient as documented above.  A hernia reduction was performed as outlined in the procedure note above.  The patient tolerated well and there were no complications.     1513: On reexamination after reduction, the patient is feeling improved and declines any additional pain medication.     Findings and plan explained to the Patient. Patient discharged home with instructions regarding supportive care, medications, and reasons to return. The importance of close follow-up was reviewed.      Impression & Plan      Medical Decision Making:  Michael Colorado is a very pleasant 62 year old year old patient who presents to the emergency department with concern of abdominal pain from his umbilical hernia.  Vitals were unremarkable. Physical exam was concerning for an umbilical hernia.  The exact etiology of the  abdominal pain is likely from the hernia. EMS provided 100 mcg of fentanyl which allowed him to have enough pain control to allow me to reduce it.  He felt much better after this. No recurrent pain after. The differential diagnosis of abdominal pain is protean and includes: Appendicitis, Bowel Obstruction, Ulcer, Ischemia, Cholecystitis, Diverticulitis, Pancreatitis, UTI, Pyelonephritis, Enteritis/Colitis, AAA amongst many other etiologies. The finding of point tenderness over the hernia is the likely cause of symptoms.    With gentle constant pressure I was able to reduce the hernia in the room.    Following reduction patient had complete resolution of symptoms and felt much improved.  As he has had pain for a short period of time I did not feel that any further imaging was required and he could follow up with surgery as needed for future management. He already saw Dr. Hein and plans to follow up with him.    The treatment plan was discussed with the patient and they expressed understanding of this plan and consented to the plan.  In addition, the patient will return to the emergency department if their symptoms persist, worsen, if new symptoms arise or if there is any concern as other pathology may be present that is not evident at this time. They also understand the importance of close follow up in the clinic and if unable to do so will return to the emergency department for a reevaluation. All questions were answered.    Diagnosis:    ICD-10-CM   1. Umbilical hernia without obstruction and without gangrene K42.9       Disposition:  Discharged to home.     Blayne MCCOY, am serving as a scribe at 3:09 PM on 10/1/2017 to document services personally performed by Mahin Myers DO, based on my observations and the provider's statements to me.       EMERGENCY DEPARTMENT       Nestor Myers DO  10/01/17 1556

## 2017-10-01 NOTE — DISCHARGE INSTRUCTIONS
Hernia (Adult)    A hernia can happen when there is a weakness or defect in the wall of the abdomen or groin. Intestines or nearby tissues may move from their usual location and push through the weakness in the wall. This can cause a hernia (bulge) you may see or feel.  Causes and Risk Factors   A hernia may be present at birth. Or it may be caused by the wear and tear of daily living. Certain factors can make a hernia more likely. These can include:    Heavy lifting    Straining, whether from lifting, movement, or constipation    Chronic cough    Injury to the abdominal wall    Excess weight    Pregnancy    Prior surgery    Older age    Family history of hernia  Symptoms  Symptoms of a hernia may come on suddenly. Or they may appear slowly over time. Some common symptoms include:    Bulge in the groin area, around the navel, or in the scrotum (the bulge may get bigger when you stand and go away when you lie down)    Pain or pressure around the bulge    Pain during activities such as lifting, coughing, or sneezing    A feeling of weakness or pressure in the groin    Pain or swelling in the scrotum  Types of hernias  There are different types of hernia. The type you have depends on its location:    Inguinal: This type is in the groin or scrotum. It is more common in men.    Femoral: This type is in the groin, upper thigh (where the leg bends), or labia. It is more common in women.    Ventral: This type is in the abdominal wall.    Umbilical: This type occurs around the navel (belly button).    Incisional: This type occurs at the site of a previous surgery.  The condition of the hernia can help determine how urgently it needs to be treated.    Reducible: It goes back in by itself, or it can be pushed back in.    Irreducible: It can t be pushed back in.    Incarcerated/Strangulated: The intestine is trapped (incarcerated). If this happens, you won t be able to push the bulge back in. If the incarcerated hernia isn t  treated, it may become strangulated. This means the area loses blood supply and the tissue may die. This requires emergency surgery! Treatment is needed right away!  In most cases, a hernia will not heal on its own. Surgery is usually needed to repair the defect in the abdominal wall or groin. You ll be told more about surgery, if needed.  If your symptoms are not severe, treatment may sometimes be delayed. In such cases, regular follow-up visits with the provider will be needed. You ll be asked to keep track of your symptoms and to watch for signs of more serious problems. You may also be given guidelines similar to the home care instructions below.  Home Care  To help keep a hernia from getting worse, you may be advised to:    Avoid heavy lifting and straining as directed.    Take steps to prevent constipation, such as eating more fiber and drinking more water. This may help reduce straining that can occur when having a bowel movement. Reducing straining may help keep your symptoms from getting worse.    Maintain a healthy weight or lose excess weight. This can help reduce strain on abdominal muscles and tissues.    Stop smoking. This can help prevent coughing that may also strain abdominal muscles and tissues.  Follow-up care  Follow up with your healthcare provider, or as directed. If imaging tests were done, they will be reviewed a doctor. You will be told the results and any new findings that may affect your care.  When to seek medical advice  Call your healthcare provider right away if any of these occur:    Hernia hardens, swells, or grows larger    Hernia can no longer be pushed back in    Pain moves to the lower right abdomen (just below the waistline), or spreads to the back  Call 911  Call 911 right away if any of these occur:    Nausea and vomiting    Severe pain, redness, or tenderness in the area near the hernia    Pain worsens quickly and doesn t get better    Inability to have a bowel movement or  pass gas    Fever of 100.4 F (38 C) or higher    Trouble breathing    Fainting    Rapid heart rate    Vomiting blood    Large amounts of blood in stool  Date Last Reviewed: 6/9/2015 2000-2017 [employer]. [address] [place] [address], [place] [address]. All rights reserved. This information is not intended as a substitute for professional medical care. Always follow your healthcare professional's instructions.

## 2017-10-01 NOTE — ED AVS SNAPSHOT
Emergency Department    [address] [facility] [address]    Phone:  [phone]    Fax:  [phone]                                       Michael Colorado   MRN: [id number]    Department:   Emergency Department   Date of Visit:  10/1/2017           Patient Information     Date Of Birth          1955        Your diagnoses for this visit were:     Umbilical hernia without obstruction and without gangrene        You were seen by Nestor Myers DO.      Follow-up Information     Follow up with Jairo Angel MD In 2 days.    Specialty:  Surgery    Contact information:    [address]  [address]  [facility] [address]  [phone]          Follow up with  Emergency Department.    Specialty:  EMERGENCY MEDICINE    Why:  If symptoms worsen    Contact information:    [address] [facility] [address] [phone]        Discharge Instructions         Hernia (Adult)    A hernia can happen when there is a weakness or defect in the wall of the abdomen or groin. Intestines or nearby tissues may move from their usual location and push through the weakness in the wall. This can cause a hernia (bulge) you may see or feel.  Causes and Risk Factors   A hernia may be present at birth. Or it may be caused by the wear and tear of daily living. Certain factors can make a hernia more likely. These can include:    Heavy lifting    Straining, whether from lifting, movement, or constipation    Chronic cough    Injury to the abdominal wall    Excess weight    Pregnancy    Prior surgery    Older age    Family history of hernia  Symptoms  Symptoms of a hernia may come on suddenly. Or they may appear slowly over time. Some common symptoms include:    Bulge in the groin area, around the navel, or in the scrotum (the bulge may get bigger when you stand and go away when you lie down)    Pain or pressure around the bulge    Pain during activities such as lifting, coughing, or sneezing    A feeling  of weakness or pressure in the groin    Pain or swelling in the scrotum  Types of hernias  There are different types of hernia. The type you have depends on its location:    Inguinal: This type is in the groin or scrotum. It is more common in men.    Femoral: This type is in the groin, upper thigh (where the leg bends), or labia. It is more common in women.    Ventral: This type is in the abdominal wall.    Umbilical: This type occurs around the navel (belly button).    Incisional: This type occurs at the site of a previous surgery.  The condition of the hernia can help determine how urgently it needs to be treated.    Reducible: It goes back in by itself, or it can be pushed back in.    Irreducible: It can t be pushed back in.    Incarcerated/Strangulated: The intestine is trapped (incarcerated). If this happens, you won t be able to push the bulge back in. If the incarcerated hernia isn t treated, it may become strangulated. This means the area loses blood supply and the tissue may die. This requires emergency surgery! Treatment is needed right away!  In most cases, a hernia will not heal on its own. Surgery is usually needed to repair the defect in the abdominal wall or groin. You ll be told more about surgery, if needed.  If your symptoms are not severe, treatment may sometimes be delayed. In such cases, regular follow-up visits with the provider will be needed. You ll be asked to keep track of your symptoms and to watch for signs of more serious problems. You may also be given guidelines similar to the home care instructions below.  Home Care  To help keep a hernia from getting worse, you may be advised to:    Avoid heavy lifting and straining as directed.    Take steps to prevent constipation, such as eating more fiber and drinking more water. This may help reduce straining that can occur when having a bowel movement. Reducing straining may help keep your symptoms from getting worse.    Maintain a healthy  weight or lose excess weight. This can help reduce strain on abdominal muscles and tissues.    Stop smoking. This can help prevent coughing that may also strain abdominal muscles and tissues.  Follow-up care  Follow up with your healthcare provider, or as directed. If imaging tests were done, they will be reviewed a doctor. You will be told the results and any new findings that may affect your care.  When to seek medical advice  Call your healthcare provider right away if any of these occur:    Hernia hardens, swells, or grows larger    Hernia can no longer be pushed back in    Pain moves to the lower right abdomen (just below the waistline), or spreads to the back  Call 911  Call 911 right away if any of these occur:    Nausea and vomiting    Severe pain, redness, or tenderness in the area near the hernia    Pain worsens quickly and doesn t get better    Inability to have a bowel movement or pass gas    Fever of 100.4 F (38 C) or higher    Trouble breathing    Fainting    Rapid heart rate    Vomiting blood    Large amounts of blood in stool  Date Last Reviewed: 6/9/2015 2000-2017 [employer]. [address] [place] [address]. All rights reserved. This information is not intended as a substitute for professional medical care. Always follow your healthcare professional's instructions.          24 Hour Appointment Hotline       To make an appointment at any [facility] clinic, call [phone] ([phone]). If you don't have a family doctor or clinic, we will help you find one. [facility] clinics are conveniently located to serve the needs of you and your family.             Review of your medicines      Our records show that you are taking the medicines listed below. If these are incorrect, please call your family doctor or clinic.        Dose / Directions Last dose taken    aspirin 81 MG tablet        Take by mouth daily   Refills:  0        blood glucose monitoring lancets   Quantity:   102 each        Use to test blood sugar 3 times daily or as directed.   Refills:  3        blood glucose monitoring meter device kit   Quantity:  1 kit        Use to test blood sugars 3 times daily or as directed.   Refills:  0        * blood glucose monitoring test strip   Commonly known as:  no brand specified   Quantity:  100 strip        Use to test blood sugars 4 times daily or as directed please provide per formulary approval patient states Verio.   Refills:  3        * blood glucose monitoring test strip   Commonly known as:  ACCU-CHEK YESSY   Quantity:  100 strip        Use to test blood sugars 3 times daily or as directed.   Refills:  11        HYDROcodone-acetaminophen 7.5-325 MG per tablet   Commonly known as:  NORCO   Dose:  1 tablet   Quantity:  60 tablet        Take 1 tablet by mouth 2 times daily   Refills:  0        insulin degludec 200 UNIT/ML pen   Commonly known as:  TRESIBA   Dose:  46 Units   Quantity:  18 mL        Inject 46 Units Subcutaneous daily   Refills:  3        metFORMIN 500 MG tablet   Commonly known as:  GLUCOPHAGE   Dose:  1000 mg   Quantity:  120 tablet        Take 2 tablets (1,000 mg) by mouth 2 times daily (with meals)   Refills:  5        morphine 60 MG 12 hr tablet   Commonly known as:  MS CONTIN   Dose:  60 mg   Quantity:  60 tablet        Take 1 tablet (60 mg) by mouth every 12 hours   Refills:  0        NovoLOG FLEXPEN 100 UNIT/ML injection   Quantity:  15 mL   Generic drug:  insulin aspart        INJECT 20 UNITS SUBCUTANEOUS 4 TIMES DAILY (WITH MEALS AND NIGHTLY)   Refills:  1        TYLENOL PM EXTRA STRENGTH PO        Take by mouth At Bedtime   Refills:  0        * Notice:  This list has 2 medication(s) that are the same as other medications prescribed for you. Read the directions carefully, and ask your doctor or other care provider to review them with you.            Orders Needing Specimen Collection     None      Pending Results     No orders found from 9/29/2017 to  10/2/2017.            Pending Culture Results     No orders found from 9/29/2017 to 10/2/2017.            Pending Results Instructions     If you had any lab results that were not finalized at the time of your Discharge, you can call the ED Lab Result RN at [phone]. You will be contacted by this team for any positive Lab results or changes in treatment. The nurses are available 7 days a week from 10A to 6:30P.  You can leave a message 24 hours per day and they will return your call.        Test Results From Your Hospital Stay               Clinical Quality Measure: Blood Pressure Screening     Your blood pressure was checked while you were in the emergency department today. The last reading we obtained was  BP: 143/85 . Please read the guidelines below about what these numbers mean and what you should do about them.  If your systolic blood pressure (the top number) is less than 120 and your diastolic blood pressure (the bottom number) is less than 80, then your blood pressure is normal. There is nothing more that you need to do about it.  If your systolic blood pressure (the top number) is 120-139 or your diastolic blood pressure (the bottom number) is 80-89, your blood pressure may be higher than it should be. You should have your blood pressure rechecked within a year by a primary care provider.  If your systolic blood pressure (the top number) is 140 or greater or your diastolic blood pressure (the bottom number) is 90 or greater, you may have high blood pressure. High blood pressure is treatable, but if left untreated over time it can put you at risk for heart attack, stroke, or kidney failure. You should have your blood pressure rechecked by a primary care provider within the next 4 weeks.  If your provider in the emergency department today gave you specific instructions to follow-up with your doctor or provider even sooner than that, you should follow that instruction and not wait for up to 4 weeks for  "your follow-up visit.        Thank you for choosing [facility]       Thank you for choosing [facility] for your care. Our goal is always to provide you with excellent care. Hearing back from our patients is one way we can continue to improve our services. Please take a few minutes to complete the written survey that you may receive in the mail after you visit with us. Thank you!        [employer] Information     [employer] lets you send messages to your doctor, view your test results, renew your prescriptions, schedule appointments and more. To sign up, go to [email or web address] . Click on \"Log in\" on the left side of the screen, which will take you to the Welcome page. Then click on \"Sign up Now\" on the right side of the page.     You will be asked to enter the access code listed below, as well as some personal information. Please follow the directions to create your username and password.     Your access code is: [email or web address]  Expires: 2017  3:24 PM     Your access code will  in 90 days. If you need help or a new code, please call your [facility] clinic or [phone].        Care EveryWhere ID     This is your Care EveryWhere ID. This could be used by other organizations to access your [facility] medical records  [id number]        Equal Access to Services     JESSICA PALUMBO : Wilian Rebolledo, waaxda luqadaha, qaybta kaalmada adefuadyada, wing wang. So [facility] [phone].    ATENCIÓN: Si habla español, tiene a howe disposición servicios gratuitos de asistencia lingüística. Llame al [phone].    We comply with applicable federal civil rights laws and [place] laws. We do not discriminate on the basis of race, color, national origin, age, disability, sex, sexual orientation, or gender identity.            After Visit Summary       This is your record. Keep this with you and show to your community pharmacist(s) and doctor(s) at your next visit.                  "

## 2018-01-01 NOTE — TELEPHONE ENCOUNTER
Scripts placed at the   LVM for patient to contact clinic  (because voicemail did not say patient's name, TC unable to leave detailed message  Just needs to be told scripts are at the  for pickup)  Martha Padron TC   Home

## 2018-01-09 ENCOUNTER — OFFICE VISIT (OUTPATIENT)
Dept: FAMILY MEDICINE | Facility: CLINIC | Age: 63
End: 2018-01-09
Payer: COMMERCIAL

## 2018-01-09 VITALS
DIASTOLIC BLOOD PRESSURE: 79 MMHG | BODY MASS INDEX: 36.45 KG/M2 | TEMPERATURE: 97.7 F | SYSTOLIC BLOOD PRESSURE: 146 MMHG | WEIGHT: 315 LBS | OXYGEN SATURATION: 96 % | HEIGHT: 78 IN | HEART RATE: 87 BPM | RESPIRATION RATE: 14 BRPM

## 2018-01-09 DIAGNOSIS — E11.65 POORLY CONTROLLED TYPE 2 DIABETES MELLITUS (H): Primary | ICD-10-CM

## 2018-01-09 DIAGNOSIS — Z23 NEED FOR PROPHYLACTIC VACCINATION AND INOCULATION AGAINST INFLUENZA: ICD-10-CM

## 2018-01-09 DIAGNOSIS — M50.30 DDD (DEGENERATIVE DISC DISEASE), CERVICAL: ICD-10-CM

## 2018-01-09 DIAGNOSIS — M17.10 PRIMARY LOCALIZED OSTEOARTHROSIS OF LOWER LEG, UNSPECIFIED LATERALITY: ICD-10-CM

## 2018-01-09 DIAGNOSIS — E11.65 TYPE 2 DIABETES MELLITUS WITH HYPERGLYCEMIA, WITH LONG-TERM CURRENT USE OF INSULIN (H): ICD-10-CM

## 2018-01-09 DIAGNOSIS — Z13.89 SCREENING FOR DIABETIC PERIPHERAL NEUROPATHY: ICD-10-CM

## 2018-01-09 DIAGNOSIS — Z79.4 TYPE 2 DIABETES MELLITUS WITH HYPERGLYCEMIA, WITH LONG-TERM CURRENT USE OF INSULIN (H): ICD-10-CM

## 2018-01-09 DIAGNOSIS — Z12.11 SCREEN FOR COLON CANCER: ICD-10-CM

## 2018-01-09 LAB — HBA1C MFR BLD: 11.1 % (ref 4.3–6)

## 2018-01-09 PROCEDURE — 99207 C FOOT EXAM  NO CHARGE: CPT | Performed by: FAMILY MEDICINE

## 2018-01-09 PROCEDURE — 82043 UR ALBUMIN QUANTITATIVE: CPT | Performed by: FAMILY MEDICINE

## 2018-01-09 PROCEDURE — 83036 HEMOGLOBIN GLYCOSYLATED A1C: CPT | Performed by: FAMILY MEDICINE

## 2018-01-09 PROCEDURE — 80053 COMPREHEN METABOLIC PANEL: CPT | Performed by: FAMILY MEDICINE

## 2018-01-09 PROCEDURE — 36415 COLL VENOUS BLD VENIPUNCTURE: CPT | Performed by: FAMILY MEDICINE

## 2018-01-09 PROCEDURE — 84443 ASSAY THYROID STIM HORMONE: CPT | Performed by: FAMILY MEDICINE

## 2018-01-09 PROCEDURE — 80061 LIPID PANEL: CPT | Performed by: FAMILY MEDICINE

## 2018-01-09 PROCEDURE — 99214 OFFICE O/P EST MOD 30 MIN: CPT | Performed by: FAMILY MEDICINE

## 2018-01-09 RX ORDER — MORPHINE SULFATE 30 MG/1
30 TABLET, FILM COATED, EXTENDED RELEASE ORAL EVERY 12 HOURS
Qty: 60 TABLET | Refills: 0 | Status: SHIPPED | OUTPATIENT
Start: 2018-01-09 | End: 2018-02-06

## 2018-01-09 RX ORDER — HYDROCODONE BITARTRATE AND ACETAMINOPHEN 5; 325 MG/1; MG/1
1-2 TABLET ORAL EVERY 12 HOURS
Qty: 60 TABLET | Refills: 0 | Status: SHIPPED | OUTPATIENT
Start: 2018-01-09 | End: 2018-02-06

## 2018-01-09 ASSESSMENT — ANXIETY QUESTIONNAIRES
3. WORRYING TOO MUCH ABOUT DIFFERENT THINGS: NOT AT ALL
5. BEING SO RESTLESS THAT IT IS HARD TO SIT STILL: NOT AT ALL
GAD7 TOTAL SCORE: 7
1. FEELING NERVOUS, ANXIOUS, OR ON EDGE: SEVERAL DAYS
7. FEELING AFRAID AS IF SOMETHING AWFUL MIGHT HAPPEN: MORE THAN HALF THE DAYS
2. NOT BEING ABLE TO STOP OR CONTROL WORRYING: MORE THAN HALF THE DAYS
6. BECOMING EASILY ANNOYED OR IRRITABLE: SEVERAL DAYS

## 2018-01-09 ASSESSMENT — PATIENT HEALTH QUESTIONNAIRE - PHQ9
SUM OF ALL RESPONSES TO PHQ QUESTIONS 1-9: 14
5. POOR APPETITE OR OVEREATING: SEVERAL DAYS

## 2018-01-09 NOTE — NURSING NOTE
"Chief Complaint   Patient presents with     Diabetes       Initial /79 (Cuff Size: Adult Large)  Pulse 87  Temp 97.7  F (36.5  C) (Tympanic)  Resp 14  Ht 6' 8\" (2.032 m)  Wt (!) 366 lb (166 kg)  SpO2 96%  BMI 40.21 kg/m2 Estimated body mass index is 40.21 kg/(m^2) as calculated from the following:    Height as of this encounter: 6' 8\" (2.032 m).    Weight as of this encounter: 366 lb (166 kg).  Medication Reconciliation: complete   Roseann Hampton, CMA    "

## 2018-01-09 NOTE — PROGRESS NOTES
SUBJECTIVE:   Michael Colorado is a 62 year old male who presents to clinic today for the following health issues:      Diabetes Follow-up    Patient is checking blood sugars: twice daily.    Blood sugar testing frequency justification: Uncontrolled diabetes  Results are as follows:       am - not sure lately             postprandial after supper- same, not sure  Pt states he has been out of medication for a while         Diabetic concerns: blood sugar frequently over 200     Symptoms of hypoglycemia (low blood sugar): none     Paresthesias (numbness or burning in feet) or sores: No     Date of last diabetic eye exam: never had one   BP Readings from Last 2 Encounters:   10/01/17 143/85   02/24/17 136/89     Hemoglobin A1C (%)   Date Value   02/03/2017 9.3 (H)   07/18/2016 7.6 (H)     LDL Cholesterol Calculated (mg/dL)   Date Value   12/03/2015 71   02/05/2015 77         Amount of exercise or physical activity: 6-7 days/week for an average of 30-45 minutes    Problems taking medications regularly: out of medications     Medication side effects: none    Diet: regular (no restrictions)      Problem list and histories reviewed & adjusted, as indicated.  Additional history: as documented    Patient Active Problem List   Diagnosis     Other and unspecified disc disorder     Tinnitus     CARDIOVASCULAR SCREENING; LDL GOAL LESS THAN 130     Advanced directives, counseling/discussion     Primary localized osteoarthrosis, lower leg     Smoker     Morbid obesity with BMI of 45.0-49.9, adult (H)     Morbid obesity (H)     Chronic pain syndrome     Hepatitis C     Obstructive sleep apnea     Tobacco use disorder     Type 2 diabetes mellitus with hyperglycemia (H)     Past Surgical History:   Procedure Laterality Date     SURGICAL HISTORY OF -   10/03    Lt knee partial medial meniscectomy       Social History   Substance Use Topics     Smoking status: Current Every Day Smoker     Packs/day: 1.00     Years: 30.00     Types:  Cigarettes     Smokeless tobacco: Never Used     Alcohol use No      Comment: quit      Family History   Problem Relation Age of Onset     DIABETES Brother          Current Outpatient Prescriptions   Medication Sig Dispense Refill     morphine (MS CONTIN) 30 MG 12 hr tablet Take 1 tablet (30 mg) by mouth every 12 hours maximum 2 tablet(s) per day 60 tablet 0     HYDROcodone-acetaminophen (NORCO) 5-325 MG per tablet Take 1-2 tablets by mouth every 12 hours maximum 2 tablet(s) per day 60 tablet 0     insulin aspart (NOVOLOG FLEXPEN) 100 UNIT/ML injection -199, then 2 units  -249, then 4 units  -299, then 6 units   -349, then 8 units   -399, then 10 units   -449, then 12 units   -499, then 14 units 15 mL 1     blood glucose monitoring (ACCU-CHEK YESSY) test strip Use to test blood sugars 3 times daily or as directed. 100 strip 11     blood glucose monitoring (ACCU-CHEK YESSY PLUS) meter device kit Use to test blood sugars 3 times daily or as directed. 1 kit 0     blood glucose monitoring (ACCU-CHEK FASTCLIX) lancets Use to test blood sugar 3 times daily or as directed. 102 each 3     aspirin 81 MG tablet Take by mouth daily       metFORMIN (GLUCOPHAGE) 500 MG tablet Take 2 tablets (1,000 mg) by mouth 2 times daily (with meals) (Patient not taking: Reported on 1/9/2018) 120 tablet 5     insulin degludec (TRESIBA) 200 UNIT/ML pen Inject 46 Units Subcutaneous daily (Patient not taking: Reported on 1/9/2018) 18 mL 3     blood glucose monitoring (NO BRAND SPECIFIED) test strip Use to test blood sugars 4 times daily or as directed please provide per formulary approval patient states Verio. (Patient not taking: Reported on 1/9/2018) 100 strip 3     [DISCONTINUED] NOVOLOG FLEXPEN 100 UNIT/ML soln INJECT 20 UNITS SUBCUTANEOUS 4 TIMES DAILY (WITH MEALS AND NIGHTLY) (Patient not taking: Reported on 1/9/2018) 15 mL 1     Diphenhydramine-APAP, sleep, (TYLENOL PM EXTRA STRENGTH PO) Take by  "mouth At Bedtime       Allergies   Allergen Reactions     No Known Drug Allergies      Recent Labs   Lab Test  02/03/17   1047  11/15/16   1149  07/18/16   0951   03/18/16   0947  12/03/15   1021   02/05/15   0837  04/29/13   1138   05/17/12   0904   A1C  9.3*   --   7.6*   --   7.7*  6.7*   < >  7.0*  6.4*   < >  5.7   LDL   --    --    --    --    --   71   --   77  112   --   134*   HDL   --    --    --    --    --   28*   --   32*  35*   --   37*   TRIG   --    --    --    --    --   312*   --   287*  218*   --   167*   ALT   --   33   --    --    --    --    --   42  50   --   50   CR  0.62*   --   0.67   < >  0.77  0.60*   < >  0.64*  0.60*   --   0.81   GFRESTIMATED  >90  Non  GFR Calc     --   >90  Non  GFR Calc     < >  >90  Non  GFR Calc    >90  Non  GFR Calc     < >  >90  Non  GFR Calc    >90   --   >90   GFRESTBLACK  >90   GFR Calc     --   >90   GFR Calc     --   >90   GFR Calc    >90   GFR Calc     < >  >90   GFR Calc    >90   --   >90   POTASSIUM  4.6   --   4.0   < >  4.1  3.9   < >  4.0  4.1   --   4.7   TSH   --    --    --    --    --    --    --    --   1.25   --   1.68    < > = values in this interval not displayed.      BP Readings from Last 3 Encounters:   01/09/18 146/79   10/01/17 143/85   02/24/17 136/89    Wt Readings from Last 3 Encounters:   01/09/18 (!) 366 lb (166 kg)   10/01/17 (!) 350 lb (158.8 kg)   02/24/17 (!) 383 lb (173.7 kg)              Reviewed and updated as needed this visit by clinical staff     Reviewed and updated as needed this visit by Provider         ROS:  Constitutional, HEENT, cardiovascular, pulmonary, gi and gu systems are negative, except as otherwise noted.      OBJECTIVE:   /79 (Cuff Size: Adult Large)  Pulse 87  Temp 97.7  F (36.5  C) (Tympanic)  Resp 14  Ht 6' 8\" (2.032 m)  Wt (!) 366 lb " (166 kg)  SpO2 96%  BMI 40.21 kg/m2  Body mass index is 40.21 kg/(m^2).  GENERAL: healthy, alert and no distress  NECK: no adenopathy, no asymmetry, masses, or scars and thyroid normal to palpation  RESP: lungs clear to auscultation - no rales, rhonchi or wheezes  CV: regular rate and rhythm, normal S1 S2, no S3 or S4, no murmur, click or rub, no peripheral edema and peripheral pulses strong  ABDOMEN: soft, nontender, no hepatosplenomegaly, no masses and bowel sounds normal  MS: no gross musculoskeletal defects noted, no edema        ASSESSMENT/PLAN:   ASSESSMENT / PLAN:  (E11.65) Poorly controlled type 2 diabetes mellitus (H)  (primary encounter diagnosis)  Comment: has been off of insulin due to health insurance coverage, will resume novolog SSI, and f/u with diabetic educator ASAP, pt acknowledged and agreed with the plan   Plan: DIABETES EDUCATOR REFERRAL, FOOT EXAM  NO         CHARGE [57643.114], HEMOGLOBIN A1C, Lipid panel        reflex to direct LDL Fasting, Albumin Random         Urine Quantitative with Creat Ratio, TSH WITH         FREE T4 REFLEX, Comprehensive metabolic panel            (Z12.11) Screen for colon cancer      (Z13.89) Screening for diabetic peripheral neuropathy  Plan: FOOT EXAM  NO CHARGE [95260.114]            (Z23) Need for prophylactic vaccination and inoculation against influenza      (M50.30) DDD (degenerative disc disease), cervical  Comment: has been worsening with cold weather, has been off of med due to insurance coverage, will have him to take lower dose of pain meds with monthly f/u   Plan: morphine (MS CONTIN) 30 MG 12 hr tablet,         HYDROcodone-acetaminophen (NORCO) 5-325 MG per         tablet            (M17.10) Primary localized osteoarthrosis of lower leg, unspecified laterality  Comment: mentioned above, will recheck U tox randomly and frequently   Plan: morphine (MS CONTIN) 30 MG 12 hr tablet,         HYDROcodone-acetaminophen (NORCO) 5-325 MG per         tablet             (E11.65,  Z79.4) Type 2 diabetes mellitus with hyperglycemia, with long-term current use of insulin (H)  Comment: mentioned above   Plan: insulin aspart (NOVOLOG FLEXPEN) 100 UNIT/ML         injection                FUTURE APPOINTMENTS:       - Follow-up visit in 1 month     Edison New MD  [facility]

## 2018-01-09 NOTE — MR AVS SNAPSHOT
After Visit Summary   1/9/2018    Michael Colorado    MRN: [id number]           Patient Information     Date Of Birth          1955        Visit Information        Provider Department      1/9/2018 10:40 AM Edison New MD [facility]        Today's Diagnoses     Poorly controlled type 2 diabetes mellitus (H)    -  1    Screen for colon cancer        Screening for diabetic peripheral neuropathy        Need for prophylactic vaccination and inoculation against influenza        DDD (degenerative disc disease), cervical        Primary localized osteoarthrosis of lower leg, unspecified laterality        Type 2 diabetes mellitus with hyperglycemia, with long-term current use of insulin (H)           Follow-ups after your visit        Additional Services     DIABETES EDUCATOR REFERRAL       DIABETES SELF MANAGEMENT TRAINING (DSMT)      Your provider has referred you to Diabetes Education: [facility]: Diabetes Education - All [facility] [phone]   [email or web address]     If an urgent visit is needed or A1C is above 12, Care Team to call the Diabetes  Education Team at [phone] or send an In Basket message to the Diabetes Education Pool (P DIAB ED-PATIENT CARE).    A  will call you to make your appointment. If it has been more than 3 business days since your referral was placed, please call the above phone number to schedule.    Type of training and number of hours: New Diagnosis: Initial group DSMT - 10 hours.      Medicare covers: 10 hours of initial DSMT in 12 month period from the time of first visit, plus 2 hours of follow-up DSMT annually, and additional hours as requested for insulin training.    Diabetes Type: Type 2 - On Oral Medication             Diabetes Co-Morbidities: atherosclerotic cardiovascular disease and dyslipidemia               A1C Goal:  <7.0       A1C is: Lab Results       Component                 Value               Date                       A1C                      9.3                 02/03/2017              Diabetes Education Topics: Comprehensive Knowledge Assessment and Instruction    Special Educational Needs Requiring Individual DSMT: None       MEDICAL NUTRITION THERAPY (MNT) for Diabetes    Medical Nutrition Therapy with a Registered Dietitian can be provided in coordination with Diabetes Self-Management Training to assist in achieving optimal diabetes management.    MNT Type and Hours: Previous diagnosis: Annual follow-up MNT - 2 hours                       Medicare will cover: 3 hours initial MNT in 12 month period after first visit, plus 2 hours of follow-up MNT annually    Please be aware that coverage of these services is subject to the terms and limitations of your health insurance plan.  Call member services at your health plan to determine Diabetes Self-Management Training (Codes  &amp; ) and Medical Nutrition Therapy (Codes 11085 & 37307) benefits and ask which blood glucose monitor brands are covered by your plan.  Please bring the following with you to your appointment:    (1)  List of current medications   (2)  List of Blood Glucose Monitor brands that are covered by your insurance plan  (3)  Blood Glucose Monitor and log book  (4)   Food records for the 3 days prior to your visit    The Certified Diabetes Educator may make diabetes medication adjustments per the CDE Protocol and Collaborative Practice Agreement.                  Follow-up notes from your care team     Return in about 1 month (around 2/9/2018).      Your next 10 appointments already scheduled     Feb 09, 2018  9:20 AM CST   Office Visit with Edison New MD   [facility] ([facility]    [address] [place] [id number] [phone]           Bring a current list of meds and any records pertaining to this visit. For Physicals, please bring immunization  "records and any forms needing to be filled out. Please arrive 10 minutes early to complete paperwork.              Who to contact     If you have questions or need follow up information about today's clinic visit or your schedule please contact [facility] directly at [phone].  Normal or non-critical lab and imaging results will be communicated to you by MyChart, letter or phone within 4 business days after the clinic has received the results. If you do not hear from us within 7 days, please contact the clinic through [facility] or phone. If you have a critical or abnormal lab result, we will notify you by phone as soon as possible.  Submit refill requests through [employer] or call your pharmacy and they will forward the refill request to us. Please allow 3 business days for your refill to be completed.          Additional Information About Your Visit        MyChart Information     [employer] lets you send messages to your doctor, view your test results, renew your prescriptions, schedule appointments and more. To sign up, go to [email or web address] . Click on \"Log in\" on the left side of the screen, which will take you to the Welcome page. Then click on \"Sign up Now\" on the right side of the page.     You will be asked to enter the access code listed below, as well as some personal information. Please follow the directions to create your username and password.     Your access code is: [email or web address]  Expires: 2018 11:16 AM     Your access code will  in 90 days. If you need help or a new code, please call your [place] clinic or [phone].        Care EveryWhere ID     This is your Care EveryWhere ID. This could be used by other organizations to access your [facility] medical records  [id number]        Your Vitals Were     Pulse Temperature Respirations Height Pulse Oximetry BMI (Body Mass Index)    87 97.7  F (36.5  C) (Tympanic) 14 6' 8\" (2.032 m) 96% 40.21 kg/m2       Blood Pressure " from Last 3 Encounters:   01/09/18 146/79   10/01/17 143/85   02/24/17 136/89    Weight from Last 3 Encounters:   01/09/18 (!) 366 lb (166 kg)   10/01/17 (!) 350 lb (158.8 kg)   02/24/17 (!) 383 lb (173.7 kg)              We Performed the Following     Albumin Random Urine Quantitative with Creat Ratio     Comprehensive metabolic panel     DIABETES EDUCATOR REFERRAL     FOOT EXAM  NO CHARGE [87721.114]     HEMOGLOBIN A1C     Lipid panel reflex to direct LDL Fasting     TSH WITH FREE T4 REFLEX          Today's Medication Changes          These changes are accurate as of: 1/9/18 11:20 AM.  If you have any questions, ask your nurse or doctor.               Start taking these medicines.        Dose/Directions    HYDROcodone-acetaminophen 5-325 MG per tablet   Commonly known as:  NORCO   Used for:  DDD (degenerative disc disease), cervical, Primary localized osteoarthrosis of lower leg, unspecified laterality   Replaces:  HYDROcodone-acetaminophen 7.5-325 MG per tablet   Started by:  Edison New MD        Dose:  1-2 tablet   Take 1-2 tablets by mouth every 12 hours maximum 2 tablet(s) per day   Quantity:  60 tablet   Refills:  0         These medicines have changed or have updated prescriptions.        Dose/Directions    insulin aspart 100 UNIT/ML injection   Commonly known as:  NovoLOG FLEXPEN   This may have changed:  See the new instructions.   Used for:  Type 2 diabetes mellitus with hyperglycemia, with long-term current use of insulin (H)   Changed by:  Edison New MD        -199, then 2 units -249, then 4 units -299, then 6 units  -349, then 8 units  -399, then 10 units  -449, then 12 units  -499, then 14 units   Quantity:  15 mL   Refills:  1       morphine 30 MG 12 hr tablet   Commonly known as:  MS CONTIN   This may have changed:    - medication strength  - how much to take  - additional instructions   Used for:  DDD (degenerative disc disease), cervical, Primary  localized osteoarthrosis of lower leg, unspecified laterality   Changed by:  Edison New MD        Dose:  30 mg   Take 1 tablet (30 mg) by mouth every 12 hours maximum 2 tablet(s) per day   Quantity:  60 tablet   Refills:  0         Stop taking these medicines if you haven't already. Please contact your care team if you have questions.     HYDROcodone-acetaminophen 7.5-325 MG per tablet   Commonly known as:  NORCO   Replaced by:  HYDROcodone-acetaminophen 5-325 MG per tablet   Stopped by:  Edison New MD                Where to get your medicines      Some of these will need a paper prescription and others can be bought over the counter.  Ask your nurse if you have questions.     Bring a paper prescription for each of these medications     HYDROcodone-acetaminophen 5-325 MG per tablet    insulin aspart 100 UNIT/ML injection    morphine 30 MG 12 hr tablet                Primary Care Provider Office Phone # Fax #    Edison New -[phone]       [address] [place] [id number]        Equal Access to Services     [facility] AH: Hadii aad ku hadasho Soomaali, waaxda luqadaha, qaybta kaalmada adeegyada, waxay idiin haycesarn lana gutierrez . So [place] -[phone].    ATENCIÓN: Si habla español, tiene a howe disposición servicios gratuitos de asistencia lingüística. [facility] [phone].    We comply with applicable federal civil rights laws and [place] laws. We do not discriminate on the basis of race, color, national origin, age, disability, sex, sexual orientation, or gender identity.            Thank you!     Thank you for choosing [facility]  for your care. Our goal is always to provide you with excellent care. Hearing back from our patients is one way we can continue to improve our services. Please take a few minutes to complete the written survey that you may receive in the mail after your visit with us. Thank you!             Your Updated Medication List -  Protect others around you: Learn how to safely use, store and throw away your medicines at www.disposemymeds.org.          This list is accurate as of: 1/9/18 11:20 AM.  Always use your most recent med list.                   Brand Name Dispense Instructions for use Diagnosis    aspirin 81 MG tablet      Take by mouth daily    Type II or unspecified type diabetes mellitus without mention of complication, uncontrolled       blood glucose monitoring lancets     102 each    Use to test blood sugar 3 times daily or as directed.    Poorly controlled type 2 diabetes mellitus (H)       blood glucose monitoring meter device kit     1 kit    Use to test blood sugars 3 times daily or as directed.    Poorly controlled type 2 diabetes mellitus (H)       * blood glucose monitoring test strip    no brand specified    100 strip    Use to test blood sugars 4 times daily or as directed please provide per formulary approval patient states Verio.    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    Poorly controlled type 2 diabetes mellitus (H)       HYDROcodone-acetaminophen 5-325 MG per tablet    NORCO    60 tablet    Take 1-2 tablets by mouth every 12 hours maximum 2 tablet(s) per day    DDD (degenerative disc disease), cervical, Primary localized osteoarthrosis of lower leg, unspecified laterality       insulin aspart 100 UNIT/ML injection    NovoLOG FLEXPEN    15 mL    -199, then 2 units -249, then 4 units -299, then 6 units  -349, then 8 units  -399, then 10 units  -449, then 12 units  -499, then 14 units    Type 2 diabetes mellitus with hyperglycemia, with long-term current use of insulin (H)       insulin degludec 200 UNIT/ML pen    TRESIBA    18 mL    Inject 46 Units Subcutaneous daily     Type 2 diabetes mellitus with hyperglycemia, with long-term current use of insulin (H), Poorly controlled type 2 diabetes mellitus (H)       metFORMIN 500 MG tablet    GLUCOPHAGE    120 tablet    Take 2 tablets (1,000 mg) by mouth 2 times daily (with meals)    Type 2 diabetes mellitus with hyperglycemia, with long-term current use of insulin (H), Poorly controlled type 2 diabetes mellitus (H)       morphine 30 MG 12 hr tablet    MS CONTIN    60 tablet    Take 1 tablet (30 mg) by mouth every 12 hours maximum 2 tablet(s) per day    DDD (degenerative disc disease), cervical, Primary localized osteoarthrosis of lower leg, unspecified laterality       TYLENOL PM EXTRA STRENGTH PO      Take by mouth At Bedtime        * Notice:  This list has 2 medication(s) that are the same as other medications prescribed for you. Read the directions carefully, and ask your doctor or other care provider to review them with you.

## 2018-01-10 ENCOUNTER — TELEPHONE (OUTPATIENT)
Dept: FAMILY MEDICINE | Facility: CLINIC | Age: 63
End: 2018-01-10

## 2018-01-10 DIAGNOSIS — E11.65 TYPE 2 DIABETES MELLITUS WITH HYPERGLYCEMIA, WITH LONG-TERM CURRENT USE OF INSULIN (H): ICD-10-CM

## 2018-01-10 DIAGNOSIS — Z79.4 TYPE 2 DIABETES MELLITUS WITH HYPERGLYCEMIA, WITH LONG-TERM CURRENT USE OF INSULIN (H): ICD-10-CM

## 2018-01-10 LAB
ALBUMIN SERPL-MCNC: 3.8 G/DL (ref 3.4–5)
ALP SERPL-CCNC: 101 U/L (ref 40–150)
ALT SERPL W P-5'-P-CCNC: 25 U/L (ref 0–70)
ANION GAP SERPL CALCULATED.3IONS-SCNC: 12 MMOL/L (ref 3–14)
AST SERPL W P-5'-P-CCNC: 17 U/L (ref 0–45)
BILIRUB SERPL-MCNC: 0.4 MG/DL (ref 0.2–1.3)
BUN SERPL-MCNC: 11 MG/DL (ref 7–30)
CALCIUM SERPL-MCNC: 8.8 MG/DL (ref 8.5–10.1)
CHLORIDE SERPL-SCNC: 97 MMOL/L (ref 94–109)
CHOLEST SERPL-MCNC: 184 MG/DL
CO2 SERPL-SCNC: 23 MMOL/L (ref 20–32)
CREAT SERPL-MCNC: 0.58 MG/DL (ref 0.66–1.25)
CREAT UR-MCNC: 79 MG/DL
GFR SERPL CREATININE-BSD FRML MDRD: >90 ML/MIN/1.7M2
GLUCOSE SERPL-MCNC: 443 MG/DL (ref 70–99)
HDLC SERPL-MCNC: 30 MG/DL
LDLC SERPL CALC-MCNC: 80 MG/DL
MICROALBUMIN UR-MCNC: 6 MG/L
MICROALBUMIN/CREAT UR: 7.11 MG/G CR (ref 0–17)
NONHDLC SERPL-MCNC: 154 MG/DL
POTASSIUM SERPL-SCNC: 4.3 MMOL/L (ref 3.4–5.3)
PROT SERPL-MCNC: 7.6 G/DL (ref 6.8–8.8)
SODIUM SERPL-SCNC: 132 MMOL/L (ref 133–144)
TRIGL SERPL-MCNC: 368 MG/DL
TSH SERPL DL<=0.005 MIU/L-ACNC: 2.2 MU/L (ref 0.4–4)

## 2018-01-10 ASSESSMENT — ANXIETY QUESTIONNAIRES: GAD7 TOTAL SCORE: 7

## 2018-01-10 NOTE — TELEPHONE ENCOUNTER
I explained him yesterday when I printed and handed over to him. He need to  the scripts and bring to the pharmacy, is reprinted again

## 2018-01-10 NOTE — TELEPHONE ENCOUNTER
Patient calling, states he did not receive novolog rx. Advised it was printed and given to him at [facility]. He states he cannot locate it. Due to lengthy sig, rx needs to be printed and manually faxed to pharmacy. Rx and pharmacy pended, routing to PCP to print and sign and TC to fax.   Destiny Winslow RN   [facility] - Triage

## 2018-01-10 NOTE — TELEPHONE ENCOUNTER
Routing to team to fax rx to pended pharmacy please.   Destiny Winslow RN   [facility] - Triage

## 2018-01-12 ENCOUNTER — OFFICE VISIT (OUTPATIENT)
Dept: EDUCATION SERVICES | Facility: CLINIC | Age: 63
End: 2018-01-12
Payer: COMMERCIAL

## 2018-01-12 DIAGNOSIS — Z79.4 TYPE 2 DIABETES MELLITUS WITH HYPERGLYCEMIA, WITH LONG-TERM CURRENT USE OF INSULIN (H): Primary | ICD-10-CM

## 2018-01-12 DIAGNOSIS — E11.65 TYPE 2 DIABETES MELLITUS WITH HYPERGLYCEMIA, WITH LONG-TERM CURRENT USE OF INSULIN (H): Primary | ICD-10-CM

## 2018-01-12 PROCEDURE — G0108 DIAB MANAGE TRN  PER INDIV: HCPCS

## 2018-01-12 RX ORDER — BLOOD-GLUCOSE METER
1 EACH MISCELLANEOUS ONCE
Qty: 1 KIT | Refills: 0 | COMMUNITY
Start: 2018-01-12 | End: 2018-01-12

## 2018-01-12 NOTE — PROGRESS NOTES
Diabetes Self Management Training: Individual Review Visit    Michael Colorado presents today for education and evaluation of glucose control related to Type 2 diabetes.    He is accompanied by self    Patient's diabetes management related comments/concerns: Is not taking Tresiba. Insurance changed the first of the year to [employer]  He does not have a card for [employer] yet and does not know how to contact them.     Patient's emotional response to diabetes: expresses readiness to learn    Patient would like this visit to be focused around the following diabetes-related behaviors and goals: Taking Medication    ASSESSMENT:  Patient Problem List and Family Medical History reviewed for relevant medical history, current medical status, and diabetes risk factors.    Current Diabetes Management per Patient:  Taking diabetes medications?   yes:     Diabetes Medication(s)     Biguanides Sig    metFORMIN (GLUCOPHAGE) 1000 MG tablet Take 1 tablet (1,000 mg) by mouth 2 times daily (with meals)    Insulin Sig    insulin aspart (NOVOLOG FLEXPEN) 100 UNIT/ML injection -199, then 2 units  -249, then 4 units  -299, then 6 units   -349, then 8 units   -399, then 10 units   -449, then 12 units   -499, then 14 units    insulin degludec (TRESIBA) 200 UNIT/ML pen Inject 46 Units Subcutaneous daily     Patient not taking:  Reported on 1/9/2018      Problems taking diabetes medications regularly? Yes, insurance changed a few times and has not covered his Tresiba.      *Abbreviated insulin dose documentation key: Insulin Trade Name (bbegxhqvt-drtyj-ffotag-bedtime) - i.e. Humalog 5-5-5-0 (Humalog 5 units at breakfast, 5 units at lunch, and 5 units at dinner).    Past Diabetes Education: Yes    Patient glucose self monitoring as follows: two times daily.   BG meter: Accu-Chek  meter  BG results: Reports they are all >300     BG values are: Not in goal    Patient's most recent   Lab Results   Component Value  "Date    A1C 11.1 01/09/2018    is not meeting goal of <7.0    Nutrition:  Patient skips breakfast and lunch regularly    Breakfast - skips  Lunch - skips   Dinner - meat, potatoes (<1 cup), veg   Snacks - \"tough one\" - bowl of ice cream maybe (~2 cups with milk and bananas)    Beverages: water, coffee (cream and sugar 2 tsp per cup)    Cultural/Lutheran diet restrictions: No     Biggest Challenge to Healthy Eating: finances    Physical Activity:    minimal    Diabetes Risk Factors:  age over 45 years, overweight/obesity and inactivity    Diabetes Complications:  Acute Complications: At risk for hypoglycemia? yes  Symptoms of low blood sugar? sweating and shaking  Frequency of hypoglycemia: none    Vitals:  There were no vitals taken for this visit.  Estimated body mass index is 40.21 kg/(m^2) as calculated from the following:    Height as of 1/9/18: 2.032 m (6' 8\").    Weight as of 1/9/18: 166 kg (366 lb). --> stable lately    Last 3 BP:   BP Readings from Last 3 Encounters:   01/09/18 146/79   10/01/17 143/85   02/24/17 136/89       History   Smoking Status     Current Every Day Smoker     Packs/day: 1.00     Years: 30.00     Types: Cigarettes   Smokeless Tobacco     Never Used       Labs:  Lab Results   Component Value Date    A1C 11.1 01/09/2018     Lab Results   Component Value Date     01/09/2018     Lab Results   Component Value Date    LDL 80 01/09/2018     HDL Cholesterol   Date Value Ref Range Status   01/09/2018 30 (L) >39 mg/dL Final   ]  GFR Estimate   Date Value Ref Range Status   01/09/2018 >90 >60 mL/min/1.7m2 Corrected     Comment:     CORRECTED ON 01/10 AT 1357: PREVIOUSLY REPORTED AS >90 Non    GFR Calc       GFR Estimate If Black   Date Value Ref Range Status   01/09/2018 >90 >60 mL/min/1.7m2 Corrected     Comment:     CORRECTED ON 01/10 AT 1357: PREVIOUSLY REPORTED AS >90  GFR   Calc       Lab Results   Component Value Date    CR 0.58 01/09/2018     No " results found for: MICROALBUMIN    Socio/Economic Considerations:    Support system: not assessed    Health Beliefs and Attitudes:   Patient Activation Measure Survey Score:  SHAKEEL Score (Last Two) 8/19/2011   SHAKEEL Raw Score 42   Activation Score 66   SHAKEEL Level 3       Stage of Change: PREPARATION (Decided to change - considering how)      Diabetes knowledge and skills assessment:     Patient is knowledgeable in diabetes management concepts related to: Monitoring and Taking Medication    Patient needs further education on the following diabetes management concepts: Healthy Eating, Being Active, Problem Solving, Reducing Risks and Healthy Coping    Barriers to Learning Assessment: No Barriers identified    Based on learning assessment above, most appropriate setting for further diabetes education would be: Individual setting.    INTERVENTION:   Called [facility] with patient today to verify which insulin they cover for basal. Insurance reports that they cover Lantus so will change from Tresiba to Lantus. Can do a 1:1 conversion but pt will likely need this dose increased as patients often need a little less when on Tresiba. Also, with his insurance change he needs a new meter so provided this to him today and ordered refills.     He tends to skip meals and then consumes quite a bit of ice cream in the evening. Briefly encouraged less sweets and more consistency with his diet so he is not skipping meals during the day.  He will benefit from more discussion on this at future visits.     Education provided today on:  AADE Self-Care Behaviors:  Healthy Eating: consistency in amount, composition, and timing of food intake and portion control  Monitoring: proper technique, log and interpret results, individual blood glucose targets and frequency of monitoring  Taking Medication: Taking his novolog for correction 3 x daily before meals (if high).  Problem Solving: low blood glucose - causes, signs/symptoms, treatment and  prevention    Opportunities for ongoing education and support in diabetes-self management were discussed.    Pt verbalized understanding of concepts discussed and recommendations provided today.       Education Materials Provided:  One Touch Verio Flex meter kit    PLAN:  See Patient Instructions for co-developed, patient-stated behavior change goals.  Recommended he reduce his ice cream intake.  Switched pt to Lantus 46 units/d from Tresiba (same dose, 1:1 conversion) as he is now on [employer] and they cover Lantus. Suspect he will need this dose increased and/or possibly split.   Gave pt new meter as [employer] does not cover accu chek meters/supplies. Ordered new strips and lancets for his new One Touch meter.   Ordered insulin pen needles as he has no order for these and is running low.   AVS printed and provided to patient today.    FOLLOW-UP:  Follow-up appointment scheduled on 1/29/18.  Education topics to cover at the next diabetes education visit(s): review diet more in detail and check BG's following switch to Lantus  Chart routed to referring provider.    Ongoing plan for education and support: Follow-up visit with diabetes educator in 2-3 weeks and Follow-up with primary care provider    LYNDSEY Garcia CDE    Time Spent: 50 minutes  Encounter Type: Individual    Any diabetes medication dose changes were made via the CDE Protocol and Collaborative Practice Agreement with the patient's referring provider. A copy of this encounter was shared with the provider.

## 2018-01-12 NOTE — Clinical Note
FYI - changed pt to Lantus for insurance coverage and ordered new testing supplies for insurance coverage. Gave him a new meter today as well. Having him come back in 2 weeks to review blood sugars with Lantus dose as this will likely need to be increased and/or split.   Lizeth Charles, RD LD CDE

## 2018-01-12 NOTE — MR AVS SNAPSHOT
After Visit Summary   1/12/2018    Michael Colorado    MRN: [id number]           Patient Information     Date Of Birth          1955        Visit Information        Provider Department      1/12/2018 9:00 AM  DIABETIC ED RESOURCE [facility]        Today's Diagnoses     Type 2 diabetes mellitus with hyperglycemia, with long-term current use of insulin (H)    -  1      Care Instructions    My Diabetes Care Goals:    Monitoring: check blood sugar 3 times daily (before meals)    Take 46 units of Lantus per day (changed from Tresiba)    Try to eat a little something for breakfast and lunch as well.     Follow up:  Call [phone]), e-mail [email or web address]), or send MedSocket message with questions, concerns or if follow-up is needed.     Bring blood glucose meter and logbook with you to all doctor and follow-up appointments.     [facility] for the [facility]:  For Your Diabetes Education and Nutrition Appointments Call:  [phone]   For Diabetes Education or Nutrition Related Questions:   Phone: [phone]  E-mail: [email or web address]  Fax: [phone]   If you need a medication refill please contact your pharmacy. Please allow 3 business days for your refills to be completed.    Instructions for emailing the Diabetes Educators    If you need to communicate a non-urgent message to a Diabetes Educator via email, please send to [email or web address].    Please follow the following email guidelines:    Subject line: Secure: your clinic name (example: Secure: [facility])  In the email please include: First name, middle initial, last name and date of birth.    We will be in touch with you within one (1) business day.             Follow-ups after your visit        Your next 10 appointments already scheduled     Pedro 15, 2018 12:15 PM CST   Return Visit with Jairo Angel MD   [facility] ([facility])    [address]  [place] [address]   [phone]            Jan 29, 2018 10:30 AM CST   Diabetic Education with  DIABETIC ED RESOURCE   [facility] ([facility]    [address] [place] [address]   [phone]            Feb 02, 2018  7:30 AM CST   [facility] Same Day Surgery with Jairo Angel MD   [facility] ([facility])    [facility])   [phone]            Feb 02, 2018   Procedure with Jairo Angel MD   [facility] (--)    [address]  [place] [address]   [phone]            Feb 09, 2018  9:20 AM CST   Office Visit with Edison New MD   [facility] ([address] [address]   [phone]           Bring a current list of meds and any records pertaining to this visit. For Physicals, please bring immunization records and any forms needing to be filled out. Please arrive 10 minutes early to complete paperwork.              Who to contact     If you have questions or need follow up information about today's clinic visit or your schedule please contact [facility] directly at [phone].  Normal or non-critical lab and imaging results will be communicated to you by MyChart, letter or phone within 4 business days after the clinic has received the results. If you do not hear from us within 7 days, please contact the clinic through [employer] or phone. If you have a critical or abnormal lab result, we will notify you by phone as soon as possible.  Submit refill requests through [employer] or call your pharmacy and they will forward the refill request to us. Please allow 3 business days for your refill to be completed.          Additional Information About  "Your Visit        [employer] Information     [employer] lets you send messages to your doctor, view your test results, renew your prescriptions, schedule appointments and more. To sign up, go to [email or web address] . Click on \"Log in\" on the left side of the screen, which will take you to the Welcome page. Then click on \"Sign up Now\" on the right side of the page.     You will be asked to enter the access code listed below, as well as some personal information. Please follow the directions to create your username and password.     Your access code is: [email or web address]  Expires: 2018 11:16 AM     Your access code will  in 90 days. If you need help or a new code, please call your [place] clinic or [phone].        Care EveryWhere ID     This is your Care EveryWhere ID. This could be used by other organizations to access your [place] medical records  [id number]         Blood Pressure from Last 3 Encounters:   18 146/79   10/01/17 143/85   17 136/89    Weight from Last 3 Encounters:   18 (!) 166 kg (366 lb)   10/01/17 (!) 158.8 kg (350 lb)   17 (!) 173.7 kg (383 lb)              Today, you had the following     No orders found for display         Today's Medication Changes          These changes are accurate as of: 18  9:49 AM.  If you have any questions, ask your nurse or doctor.               Start taking these medicines.        Dose/Directions    insulin glargine 100 UNIT/ML injection   Commonly known as:  LANTUS SOLOSTAR   Used for:  Type 2 diabetes mellitus with hyperglycemia, with long-term current use of insulin (H)        Dose:  46 Units   Inject 46 Units Subcutaneous At Bedtime   Quantity:  15 mL   Refills:  1       insulin pen needle 32G X 4 MM   Used for:  Type 2 diabetes mellitus with hyperglycemia, with long-term current use of insulin (H)        Use 4 pen needles daily or as directed.   Quantity:  150 each   Refills:  3         These medicines have changed or have " updated prescriptions.        Dose/Directions    blood glucose monitoring lancets   This may have changed:  additional instructions   Used for:  Type 2 diabetes mellitus with hyperglycemia, with long-term current use of insulin (H)        Use to test blood sugar 3 times daily or as directed.  Ok to substitute alternative if insurance prefers.   Quantity:  100 each   Refills:  6       ONETOUCH VERIO FLEX SYSTEM W/DEVICE Kit   This may have changed:    - how much to take  - how to take this  - when to take this  - additional instructions   Used for:  Type 2 diabetes mellitus with hyperglycemia, with long-term current use of insulin (H)        Dose:  1 kit   1 kit once for 1 dose   Quantity:  1 kit   Refills:  0         Stop taking these medicines if you haven't already. Please contact your care team if you have questions.     insulin degludec 200 UNIT/ML pen   Commonly known as:  TRESIBA                Where to get your medicines      These medications were sent to [email or web address] [address] [place] [address] [facility] & [facility]  [address] [facility] [address]     Phone:  [phone]     blood glucose monitoring lancets    blood glucose monitoring test strip    insulin glargine 100 UNIT/ML injection    insulin pen needle 32G X 4 MM         Some of these will need a paper prescription and others can be bought over the counter.  Ask your nurse if you have questions.     You don't need a prescription for these medications     ONETOUCH VERIO FLEX SYSTEM W/DEVICE Kit                Primary Care Provider Office Phone # Fax #    Edison JULIO New [phone]       [address] [place] [address]        Equal Access to Services     [facility] : Hadii nidia Rebolledo, waaxda luqadaha, qaybta kaalmatiago russell, wing wang. So [facility] [phone].    ATENCIÓN: Si habla español, tiene a howe disposición servicios gratuitos  de asistencia lingüística. Rock al [phone].    We comply with applicable federal civil rights laws and [place] laws. We do not discriminate on the basis of race, color, national origin, age, disability, sex, sexual orientation, or gender identity.            Thank you!     Thank you for choosing [facility]  for your care. Our goal is always to provide you with excellent care. Hearing back from our patients is one way we can continue to improve our services. Please take a few minutes to complete the written survey that you may receive in the mail after your visit with us. Thank you!             Your Updated Medication List - Protect others around you: Learn how to safely use, store and throw away your medicines at www.disposemymeds.org.          This list is accurate as of: 1/12/18  9:49 AM.  Always use your most recent med list.                   Brand Name Dispense Instructions for use Diagnosis    aspirin 81 MG tablet      Take by mouth daily    Type II or unspecified type diabetes mellitus without mention of complication, uncontrolled       blood glucose monitoring lancets     100 each    Use to test blood sugar 3 times daily or as directed.  Ok to substitute alternative if insurance prefers.    Type 2 diabetes mellitus with hyperglycemia, with long-term current use of insulin (H)       * blood glucose monitoring test strip    no brand specified    100 strip    Use to test blood sugars 4 times daily or as directed please provide per formulary approval patient states Verio.    Poorly controlled type II diabetes mellitus with renal complication (H), Morbid obesity due to excess calories (H)       * blood glucose monitoring test strip    ONETOUCH VERIO IQ    100 each    Use to test blood sugars 3 times daily or as directed.    Type 2 diabetes mellitus with hyperglycemia, with long-term current use of insulin (H)       HYDROcodone-acetaminophen 5-325 MG per tablet    NORCO    60  tablet    Take 1-2 tablets by mouth every 12 hours maximum 2 tablet(s) per day    DDD (degenerative disc disease), cervical, Primary localized osteoarthrosis of lower leg, unspecified laterality       insulin aspart 100 UNIT/ML injection    NovoLOG FLEXPEN    15 mL    -199, then 2 units -249, then 4 units -299, then 6 units  -349, then 8 units  -399, then 10 units  -449, then 12 units  -499, then 14 units    Type 2 diabetes mellitus with hyperglycemia, with long-term current use of insulin (H)       insulin glargine 100 UNIT/ML injection    LANTUS SOLOSTAR    15 mL    Inject 46 Units Subcutaneous At Bedtime    Type 2 diabetes mellitus with hyperglycemia, with long-term current use of insulin (H)       insulin pen needle 32G X 4 MM     150 each    Use 4 pen needles daily or as directed.    Type 2 diabetes mellitus with hyperglycemia, with long-term current use of insulin (H)       metFORMIN 1000 MG tablet    GLUCOPHAGE    180 tablet    Take 1 tablet (1,000 mg) by mouth 2 times daily (with meals)    Poorly controlled type 2 diabetes mellitus (H)       morphine 30 MG 12 hr tablet    MS CONTIN    60 tablet    Take 1 tablet (30 mg) by mouth every 12 hours maximum 2 tablet(s) per day    DDD (degenerative disc disease), cervical, Primary localized osteoarthrosis of lower leg, unspecified laterality       ONETOUCH VERIO FLEX SYSTEM W/DEVICE Kit     1 kit    1 kit once for 1 dose    Type 2 diabetes mellitus with hyperglycemia, with long-term current use of insulin (H)       TYLENOL PM EXTRA STRENGTH PO      Take by mouth At Bedtime        * Notice:  This list has 2 medication(s) that are the same as other medications prescribed for you. Read the directions carefully, and ask your doctor or other care provider to review them with you.

## 2018-01-12 NOTE — PATIENT INSTRUCTIONS
My Diabetes Care Goals:    Monitoring: check blood sugar 3 times daily (before meals)    Take 46 units of Lantus per day (changed from Tresiba)    Try to eat a little something for breakfast and lunch as well.     Follow up:  Call [phone]), e-mail ([email or web address]), or send MyChart message with questions, concerns or if follow-up is needed.     Bring blood glucose meter and logbook with you to all doctor and follow-up appointments.     [facility] for the [facility]:  For Your Diabetes Education and Nutrition Appointments Call:  [phone]   For Diabetes Education or Nutrition Related Questions:   Phone: [phone]  E-mail: [email or web address]  Fax: [phone]   If you need a medication refill please contact your pharmacy. Please allow 3 business days for your refills to be completed.    Instructions for emailing the Diabetes Educators    If you need to communicate a non-urgent message to a Diabetes Educator via email, please send to [email or web address].    Please follow the following email guidelines:    Subject line: Secure: your clinic name (example: Secure: [facility])  In the email please include: First name, middle initial, last name and date of birth.    We will be in touch with you within one (1) business day.

## 2018-02-05 ENCOUNTER — OFFICE VISIT (OUTPATIENT)
Dept: SURGERY | Facility: CLINIC | Age: 63
End: 2018-02-05
Payer: COMMERCIAL

## 2018-02-05 VITALS
DIASTOLIC BLOOD PRESSURE: 60 MMHG | WEIGHT: 315 LBS | BODY MASS INDEX: 36.45 KG/M2 | HEIGHT: 78 IN | HEART RATE: 71 BPM | SYSTOLIC BLOOD PRESSURE: 100 MMHG

## 2018-02-05 DIAGNOSIS — K43.6 INCARCERATED VENTRAL HERNIA: Primary | ICD-10-CM

## 2018-02-05 PROCEDURE — 99204 OFFICE O/P NEW MOD 45 MIN: CPT | Performed by: SURGERY

## 2018-02-05 NOTE — LETTER
"2018    Re: Michael Colorado - 1955    HISTORY OF PRESENT ILLNESS:  Michael Colorado is a 63 year old male who is seen in consultation at the request of Dr. New for evaluation of symptomatic incarcerated ventral hernia.  Since his last visit Mr. Colorado has lost a bit of a weight but remains morbidly obese and a smoker.  His hernia has continued to bother him, he believes the pain is worse in the symptoms more frequent.  He he wears a binder with special compression over the hernia to improve his symptoms.  He is here to discuss definitive treatment.     REVIEW OF SYSTEMS:  Constitutional:  Negative for chills, fatigue, fever and weight change.  Eyes:  Negative for new vision problems.  ENT:  Negative for ENT pain.  Cardiovascular:  Negative for chest pain, palpitations.  Respiratory:  Negative for cough, dyspnea.  Gastrointestinal: See HPI  Musculoskeletal:  Negative for new arthralgias or myalgias.  Integumentary: No additional masses other than the hernia bulge.   Vitals: /60  Pulse 71  Ht 6' 7.5\" (2.019 m)  Wt (!) 360 lb (163.3 kg)  BMI 40.05 kg/m2  BMI= Body mass index is 40.05 kg/(m^2).     EXAM:  GENERAL: healthy, alert and no distress   HEENT: moist mucus membranes, no scleral icterus  CARDIOVASCULAR:  RRR, No JVD  RESPIRATORY: non labored breathing  NECK: Neck supple. No noticeable masses.  ABDOMEN: soft, obese, tender to palpation ventral hernia, nonreducible.  Extremities: warm and well perfused, no edema  SKIN: No suspicious lesions or rashes  LABS/Imaging: None at the moment     ASSESSMENT:  Michael Colorado suffers from incarcerated ventral hernia.     PLAN:  Laparoscopic repair of incarcerated ventral hernia.   Michael Colorado understands the risk, benefits, hopeful outcomes, and possible complications, both in the short and in the long term.  All his questions answered, he will like to proceed with the propose procedure in the near future.     It is my pleasure to participate in the " care of Michael Colorado. Thank you for this consultation.    If you have any questions please give me a call.     Best regards,  Jairo Angel MD

## 2018-02-05 NOTE — MR AVS SNAPSHOT
After Visit Summary   2/5/2018    Michael Colorado    MRN: [id number]           Patient Information     Date Of Birth          1955        Visit Information        Provider Department      2/5/2018 10:00 AM Jairo Angel MD [facility]       Follow-ups after your visit        Your next 10 appointments already scheduled     Feb 06, 2018 11:00 AM CST   Office Visit with Edison New MD   [facility] ([facility])    [address] [place] [address]   [phone]           Bring a current list of meds and any records pertaining to this visit. For Physicals, please bring immunization records and any forms needing to be filled out. Please arrive 10 minutes early to complete paperwork.            Feb 12, 2018 10:30 AM CST   Diabetic Education with  DIABETIC ED RESOURCE   [facility] ([address] [address]   [phone]              Who to contact     If you have questions or need follow up information about today's clinic visit or your schedule please contact [facility] directly at [phone].  Normal or non-critical lab and imaging results will be communicated to you by [employer], letter or phone within 4 business days after the clinic has received the results. If you do not hear from us within 7 days, please contact the clinic through [employer] or phone. If you have a critical or abnormal lab result, we will notify you by phone as soon as possible.  Submit refill requests through [employer] or call your pharmacy and they will forward the refill request to us. Please allow 3 business days for your refill to be completed.          Additional Information About Your Visit        [employer] Information     [employer] lets you send messages to your doctor, view your test results, renew your  "prescriptions, schedule appointments and more. To sign up, go to [email or web address] . Click on \"Log in\" on the left side of the screen, which will take you to the Welcome page. Then click on \"Sign up Now\" on the right side of the page.     You will be asked to enter the access code listed below, as well as some personal information. Please follow the directions to create your username and password.     Your access code is: [email or web address]  Expires: 2018 11:16 AM     Your access code will  in 90 days. If you need help or a new code, please call your [place] clinic or [phone].        Care EveryWhere ID     This is your Care EveryWhere ID. This could be used by other organizations to access your [place] medical records  [id number]        Your Vitals Were     Pulse Height BMI (Body Mass Index)             71 6' 7.5\" (2.019 m) 40.05 kg/m2          Blood Pressure from Last 3 Encounters:   18 100/60   18 146/79   10/01/17 143/85    Weight from Last 3 Encounters:   18 (!) 360 lb (163.3 kg)   18 (!) 366 lb (166 kg)   10/01/17 (!) 350 lb (158.8 kg)              Today, you had the following     No orders found for display       Primary Care Provider Office Phone # Fax #    Edison JULIO New [phone]       [address] [place] [address]        Equal Access to Services     [facility]: Hadii nidia rodriguez hadasho Soomaali, waaxda luqadaha, qaybta kaalmada adeegyatiago, wing gutierrez . So [facility] [phone].    ATENCIÓN: Si habla español, tiene a howe disposición servicios gratuitos de asistencia lingüística. Llame al [phone].    We comply with applicable federal civil rights laws and [place] laws. We do not discriminate on the basis of race, color, national origin, age, disability, sex, sexual orientation, or gender identity.            Thank you!     Thank you for choosing [facility]  for your care. Our goal is " always to provide you with excellent care. Hearing back from our patients is one way we can continue to improve our services. Please take a few minutes to complete the written survey that you may receive in the mail after your visit with us. Thank you!             Your Updated Medication List - Protect others around you: Learn how to safely use, store and throw away your medicines at www.disposemymeds.org.          This list is accurate as of 2/5/18 10:36 AM.  Always use your most recent med list.                   Brand Name Dispense Instructions for use Diagnosis    aspirin 81 MG tablet      Take by mouth daily    Type II or unspecified type diabetes mellitus without mention of complication, uncontrolled       blood glucose monitoring lancets     100 each    Use to test blood sugar 3 times daily or as directed.  Ok to substitute alternative if insurance prefers.    Type 2 diabetes mellitus with hyperglycemia, with long-term current use of insulin (H)       * blood glucose monitoring test strip    no brand specified    100 strip    Use to test blood sugars 4 times daily or as directed please provide per formulary approval patient states Verio.    Poorly controlled type II diabetes mellitus with renal complication (H), Morbid obesity due to excess calories (H)       * blood glucose monitoring test strip    ONETOUCH VERIO IQ    100 each    Use to test blood sugars 3 times daily or as directed.    Type 2 diabetes mellitus with hyperglycemia, with long-term current use of insulin (H)       HYDROcodone-acetaminophen 5-325 MG per tablet    NORCO    60 tablet    Take 1-2 tablets by mouth every 12 hours maximum 2 tablet(s) per day    DDD (degenerative disc disease), cervical, Primary localized osteoarthrosis of lower leg, unspecified laterality       insulin aspart 100 UNIT/ML injection    NovoLOG FLEXPEN    15 mL    -199, then 2 units -249, then 4 units -299, then 6 units  -349, then 8 units  BS  350-399, then 10 units  -449, then 12 units  -499, then 14 units    Type 2 diabetes mellitus with hyperglycemia, with long-term current use of insulin (H)       insulin glargine 100 UNIT/ML injection    LANTUS SOLOSTAR    15 mL    Inject 46 Units Subcutaneous At Bedtime    Type 2 diabetes mellitus with hyperglycemia, with long-term current use of insulin (H)       insulin pen needle 32G X 4 MM     150 each    Use 4 pen needles daily or as directed.    Type 2 diabetes mellitus with hyperglycemia, with long-term current use of insulin (H)       metFORMIN 1000 MG tablet    GLUCOPHAGE    180 tablet    Take 1 tablet (1,000 mg) by mouth 2 times daily (with meals)    Poorly controlled type 2 diabetes mellitus (H)       morphine 30 MG 12 hr tablet    MS CONTIN    60 tablet    Take 1 tablet (30 mg) by mouth every 12 hours maximum 2 tablet(s) per day    DDD (degenerative disc disease), cervical, Primary localized osteoarthrosis of lower leg, unspecified laterality       TYLENOL PM EXTRA STRENGTH PO      Take by mouth At Bedtime        * Notice:  This list has 2 medication(s) that are the same as other medications prescribed for you. Read the directions carefully, and ask your doctor or other care provider to review them with you.

## 2018-02-06 ENCOUNTER — OFFICE VISIT (OUTPATIENT)
Dept: FAMILY MEDICINE | Facility: CLINIC | Age: 63
End: 2018-02-06
Payer: COMMERCIAL

## 2018-02-06 VITALS
OXYGEN SATURATION: 98 % | DIASTOLIC BLOOD PRESSURE: 74 MMHG | HEART RATE: 75 BPM | BODY MASS INDEX: 36.45 KG/M2 | TEMPERATURE: 97.3 F | HEIGHT: 78 IN | WEIGHT: 315 LBS | SYSTOLIC BLOOD PRESSURE: 119 MMHG | RESPIRATION RATE: 14 BRPM

## 2018-02-06 DIAGNOSIS — M17.10 PRIMARY LOCALIZED OSTEOARTHROSIS OF LOWER LEG, UNSPECIFIED LATERALITY: ICD-10-CM

## 2018-02-06 DIAGNOSIS — E11.65 TYPE 2 DIABETES MELLITUS WITH HYPERGLYCEMIA, WITH LONG-TERM CURRENT USE OF INSULIN (H): ICD-10-CM

## 2018-02-06 DIAGNOSIS — Z12.11 SCREEN FOR COLON CANCER: Primary | ICD-10-CM

## 2018-02-06 DIAGNOSIS — E66.01 MORBID OBESITY WITH BMI OF 45.0-49.9, ADULT (H): ICD-10-CM

## 2018-02-06 DIAGNOSIS — M50.30 DDD (DEGENERATIVE DISC DISEASE), CERVICAL: ICD-10-CM

## 2018-02-06 DIAGNOSIS — Z79.4 TYPE 2 DIABETES MELLITUS WITH HYPERGLYCEMIA, WITH LONG-TERM CURRENT USE OF INSULIN (H): ICD-10-CM

## 2018-02-06 PROCEDURE — 99214 OFFICE O/P EST MOD 30 MIN: CPT | Performed by: FAMILY MEDICINE

## 2018-02-06 RX ORDER — MORPHINE SULFATE 30 MG/1
30 TABLET, FILM COATED, EXTENDED RELEASE ORAL EVERY 12 HOURS
Qty: 60 TABLET | Refills: 0 | Status: SHIPPED | OUTPATIENT
Start: 2018-02-08 | End: 2018-03-06

## 2018-02-06 RX ORDER — HYDROCODONE BITARTRATE AND ACETAMINOPHEN 5; 325 MG/1; MG/1
1-2 TABLET ORAL EVERY 12 HOURS
Qty: 60 TABLET | Refills: 0 | Status: SHIPPED | OUTPATIENT
Start: 2018-02-08 | End: 2018-03-06

## 2018-02-06 NOTE — PROGRESS NOTES
[facility] Consultation    CHIEF COMPLAINT:  Chief Complaint   Patient presents with     Consult     Ventral hernia        HISTORY OF PRESENT ILLNESS:  Michale Colorado is a 63 year old male who is seen in consultation at the request of Dr. New for evaluation of symptomatic incarcerated ventral hernia.  Since his last visit Mr. Colorado has lost a bit of a weight but remains morbidly obese and a smoker.  His hernia has continued to bother him, he believes the pain is worse in the symptoms more frequent.  He he wears a binder with special compression over the hernia to improve his symptoms.  He is here to discuss definitive treatment.    REVIEW OF SYSTEMS:  Constitutional:  Negative for chills, fatigue, fever and weight change.  Eyes:  Negative for new vision problems.  ENT:  Negative for ENT pain.  Cardiovascular:  Negative for chest pain, palpitations.  Respiratory:  Negative for cough, dyspnea.  Gastrointestinal: See HPI  Musculoskeletal:  Negative for new arthralgias or myalgias.  Integumentary: No additional masses other than the hernia bulge.    Past Medical History:   Diagnosis Date     Depressive disorder, not elsewhere classified     Depression (non-psychotic)     Morbid obesity (H) 1/6/2014     Morbid obesity with BMI of 45.0-49.9, adult (H)      Morbid obesity with BMI of 45.0-49.9, adult (H)      Other and unspecified disc disorder of unspecified region     Intervertebral disc disorders     Type 2 diabetes mellitus with hyperglycemia (H) 10/28/2015       Past Surgical History:   Procedure Laterality Date     SURGICAL HISTORY OF -   10/03    Lt knee partial medial meniscectomy       Family History has been reviewed.    Social History   Substance Use Topics     Smoking status: Current Every Day Smoker     Packs/day: 1.00     Years: 30.00     Types: Cigarettes     Smokeless tobacco: Never Used     Alcohol use No      Comment: quit        Patient Active Problem List   Diagnosis     Other and  unspecified disc disorder     Tinnitus     CARDIOVASCULAR SCREENING; LDL GOAL LESS THAN 130     Advanced directives, counseling/discussion     Primary localized osteoarthrosis, lower leg     Smoker     Morbid obesity with BMI of 45.0-49.9, adult (H)     Morbid obesity (H)     Chronic pain syndrome     Hepatitis C     Obstructive sleep apnea     Tobacco use disorder     Type 2 diabetes mellitus with hyperglycemia (H)       Allergies   Allergen Reactions     No Known Drug Allergies        Current Outpatient Prescriptions   Medication Sig Dispense Refill     insulin pen needle 32G X 4 MM Use 4 pen needles daily or as directed. 150 each 3     blood glucose monitoring (ONETOUCH VERIO IQ) test strip Use to test blood sugars 3 times daily or as directed. 100 each 6     blood glucose monitoring (ONE TOUCH DELICA) lancets Use to test blood sugar 3 times daily or as directed.  Ok to substitute alternative if insurance prefers. 100 each 6     [DISCONTINUED] insulin glargine (LANTUS SOLOSTAR) 100 UNIT/ML injection Inject 46 Units Subcutaneous At Bedtime 15 mL 1     metFORMIN (GLUCOPHAGE) 1000 MG tablet Take 1 tablet (1,000 mg) by mouth 2 times daily (with meals) 180 tablet 1     insulin aspart (NOVOLOG FLEXPEN) 100 UNIT/ML injection -199, then 2 units  -249, then 4 units  -299, then 6 units   -349, then 8 units   -399, then 10 units   -449, then 12 units   -499, then 14 units 15 mL 1     blood glucose monitoring (NO BRAND SPECIFIED) test strip Use to test blood sugars 4 times daily or as directed please provide per formulary approval patient states Verio. 100 strip 3     aspirin 81 MG tablet Take by mouth daily       Diphenhydramine-APAP, sleep, (TYLENOL PM EXTRA STRENGTH PO) Take by mouth At Bedtime       [START ON 2/8/2018] morphine (MS CONTIN) 30 MG 12 hr tablet Take 1 tablet (30 mg) by mouth every 12 hours maximum 2 tablet(s) per day 60 tablet 0     [START ON 2/8/2018]  "HYDROcodone-acetaminophen (NORCO) 5-325 MG per tablet Take 1-2 tablets by mouth every 12 hours maximum 2 tablet(s) per day 60 tablet 0     insulin glargine (LANTUS SOLOSTAR) 100 UNIT/ML injection Inject 49 Units Subcutaneous At Bedtime 15 mL 1     [DISCONTINUED] insulin glargine (LANTUS SOLOSTAR) 100 UNIT/ML injection Inject 48 Units Subcutaneous At Bedtime 15 mL 1       Vitals: /60  Pulse 71  Ht 6' 7.5\" (2.019 m)  Wt (!) 360 lb (163.3 kg)  BMI 40.05 kg/m2  BMI= Body mass index is 40.05 kg/(m^2).    EXAM:  GENERAL: healthy, alert and no distress   HEENT: moist mucus membranes, no scleral icterus  CARDIOVASCULAR:  RRR, No JVD  RESPIRATORY: non labored breathing  NECK: Neck supple. No noticeable masses.  ABDOMEN: soft, obese, tender to palpation ventral hernia, nonreducible.  Extremities: warm and well perfused, no edema  SKIN: No suspicious lesions or rashes    LABS/Imaging: None at the moment    ASSESSMENT:  Michael Colorado suffers from incarcerated ventral hernia.    PLAN:  Laparoscopic repair of incarcerated ventral hernia.    Michael Colorado understands the risk, benefits, hopeful outcomes, and possible complications, both in the short and in the long term.  All his questions answered, he will like to proceed with the propose procedure in the near future.    It is my pleasure to participate in the care of Michael Colorado. Thank you for this consultation.     If you have any questions please give me a call.    Best regards,  Jairo Angel MD    Please route or send letter to:  Primary Care Provider (PCP), Referring Provider and Include Progress Note    Total time with patient visit: 30 minutes more than half spent in counseling, explanation of procedures and coordination of care.    "

## 2018-02-06 NOTE — PROGRESS NOTES
SUBJECTIVE:   Michael Colorado is a 63 year old male who presents to clinic today for the following health issues:      Diabetes Follow-up    Patient is checking blood sugars: twice daily.    Blood sugar testing frequency justification: Uncontrolled diabetes  Results are as follows:         am - 170-180              postprandial after supper- 225    Diabetic concerns: None and blood sugar frequently over 200     Symptoms of hypoglycemia (low blood sugar): none     Paresthesias (numbness or burning in feet) or sores: No     Date of last diabetic eye exam: never had one     BP Readings from Last 2 Encounters:   02/05/18 100/60   01/09/18 146/79     Hemoglobin A1C (%)   Date Value   01/09/2018 11.1 (H)   02/03/2017 9.3 (H)     LDL Cholesterol Calculated (mg/dL)   Date Value   01/09/2018 80   12/03/2015 71       Amount of exercise or physical activity: 2-3 days/week for an average of 30-45 minutes    Problems taking medications regularly: No    Medication side effects: none    Diet: regular (no restrictions)      Problem list and histories reviewed & adjusted, as indicated.  Additional history: as documented    Patient Active Problem List   Diagnosis     Other and unspecified disc disorder     Tinnitus     CARDIOVASCULAR SCREENING; LDL GOAL LESS THAN 130     Advanced directives, counseling/discussion     Primary localized osteoarthrosis, lower leg     Smoker     Morbid obesity with BMI of 45.0-49.9, adult (H)     Morbid obesity (H)     Chronic pain syndrome     Hepatitis C     Obstructive sleep apnea     Tobacco use disorder     Type 2 diabetes mellitus with hyperglycemia (H)     Past Surgical History:   Procedure Laterality Date     SURGICAL HISTORY OF -   10/03    Lt knee partial medial meniscectomy       Social History   Substance Use Topics     Smoking status: Current Every Day Smoker     Packs/day: 1.00     Years: 30.00     Types: Cigarettes     Smokeless tobacco: Never Used     Alcohol use No      Comment: quit       Family History   Problem Relation Age of Onset     DIABETES Brother          Current Outpatient Prescriptions   Medication Sig Dispense Refill     insulin glargine (LANTUS SOLOSTAR) 100 UNIT/ML injection Inject 48 Units Subcutaneous At Bedtime 15 mL 1     [START ON 2/8/2018] morphine (MS CONTIN) 30 MG 12 hr tablet Take 1 tablet (30 mg) by mouth every 12 hours maximum 2 tablet(s) per day 60 tablet 0     [START ON 2/8/2018] HYDROcodone-acetaminophen (NORCO) 5-325 MG per tablet Take 1-2 tablets by mouth every 12 hours maximum 2 tablet(s) per day 60 tablet 0     insulin pen needle 32G X 4 MM Use 4 pen needles daily or as directed. 150 each 3     blood glucose monitoring (ONETOUCH VERIO IQ) test strip Use to test blood sugars 3 times daily or as directed. 100 each 6     blood glucose monitoring (ONE TOUCH DELICA) lancets Use to test blood sugar 3 times daily or as directed.  Ok to substitute alternative if insurance prefers. 100 each 6     [DISCONTINUED] insulin glargine (LANTUS SOLOSTAR) 100 UNIT/ML injection Inject 46 Units Subcutaneous At Bedtime 15 mL 1     metFORMIN (GLUCOPHAGE) 1000 MG tablet Take 1 tablet (1,000 mg) by mouth 2 times daily (with meals) 180 tablet 1     insulin aspart (NOVOLOG FLEXPEN) 100 UNIT/ML injection -199, then 2 units  -249, then 4 units  -299, then 6 units   -349, then 8 units   -399, then 10 units   -449, then 12 units   -499, then 14 units 15 mL 1     blood glucose monitoring (NO BRAND SPECIFIED) test strip Use to test blood sugars 4 times daily or as directed please provide per formulary approval patient states Verio. 100 strip 3     aspirin 81 MG tablet Take by mouth daily       Diphenhydramine-APAP, sleep, (TYLENOL PM EXTRA STRENGTH PO) Take by mouth At Bedtime       Allergies   Allergen Reactions     No Known Drug Allergies      Recent Labs   Lab Test  01/09/18   1122  02/03/17   1047  11/15/16   1149  07/18/16   0951   12/03/15   1021    "02/05/15   0837  04/29/13   1138   A1C  11.1*  9.3*   --   7.6*   < >  6.7*   < >  7.0*  6.4*   LDL  80   --    --    --    --   71   --   77  112   HDL  30*   --    --    --    --   28*   --   32*  35*   TRIG  368*   --    --    --    --   312*   --   287*  218*   ALT  25   --   33   --    --    --    --   42  50   CR  0.58*  0.62*   --   0.67   < >  0.60*   < >  0.64*  0.60*   GFRESTIMATED  >90  >90  Non African American GFR Calc     --   >90  Non  GFR Calc     < >  >90  Non  GFR Calc     < >  >90  Non  GFR Calc    >90   GFRESTBLACK  >90  >90  African American GFR Calc     --   >90   GFR Calc     < >  >90   GFR Calc     < >  >90   GFR Calc    >90   POTASSIUM  4.3  4.6   --   4.0   < >  3.9   < >  4.0  4.1   TSH  2.20   --    --    --    --    --    --    --   1.25    < > = values in this interval not displayed.      BP Readings from Last 3 Encounters:   02/06/18 119/74   02/05/18 100/60   01/09/18 146/79    Wt Readings from Last 3 Encounters:   02/06/18 (!) 375 lb (170.1 kg)   02/05/18 (!) 360 lb (163.3 kg)   01/09/18 (!) 366 lb (166 kg)                    Reviewed and updated as needed this visit by clinical staff  Allergies       Reviewed and updated as needed this visit by Provider         ROS:  Constitutional, HEENT, cardiovascular, pulmonary, gi and gu systems are negative, except as otherwise noted.    OBJECTIVE:     /74 (Cuff Size: Adult Large)  Pulse 75  Temp 97.3  F (36.3  C) (Tympanic)  Resp 14  Ht 6' 7.5\" (2.019 m)  Wt (!) 375 lb (170.1 kg)  SpO2 98%  BMI 41.72 kg/m2  Body mass index is 41.72 kg/(m^2).  GENERAL: healthy, alert and no distress  NECK: no adenopathy, no asymmetry, masses, or scars and thyroid normal to palpation  RESP: lungs clear to auscultation - no rales, rhonchi or wheezes  CV: regular rate and rhythm, normal S1 S2, no S3 or S4, no murmur, click or rub, no peripheral edema and " peripheral pulses strong  ABDOMEN: soft, nontender, no hepatosplenomegaly, no masses and bowel sounds normal  MS: no gross musculoskeletal defects noted, no edema        ASSESSMENT/PLAN:   ASSESSMENT / PLAN:  (Z12.11) Screen for colon cancer  (primary encounter diagnosis)  Plan: Fecal colorectal cancer screen (FIT)            (E11.65,  Z79.4) Type 2 diabetes mellitus with hyperglycemia, with long-term current use of insulin (H)  Comment: gradually improving, but still average BS located around 200, will increase Lantus to 49 unites and f/u with DM educator as scheduled   Plan: insulin glargine (LANTUS SOLOSTAR) 100 UNIT/ML         injection            (M50.30) DDD (degenerative disc disease), cervical  Comment: has been stable after resuming the med, pt requested me to increase higher dose. Which was declined for concerning opioid abuse, will keep monitoring closely   Plan: morphine (MS CONTIN) 30 MG 12 hr tablet,         HYDROcodone-acetaminophen (NORCO) 5-325 MG per         tablet            (M17.10) Primary localized osteoarthrosis of lower leg, unspecified laterality  Comment: mentioned above   Plan: morphine (MS CONTIN) 30 MG 12 hr tablet,         HYDROcodone-acetaminophen (NORCO) 5-325 MG per         tablet            (E66.01,  Z68.42) Morbid obesity with BMI of 45.0-49.9, adult (H)  Comment: has no change in life style modification   Plan: encouraged him to keep working on it, pt is scheduled to do herniorrhaphy at March, encouraged him to loose wt for the surgery       FUTURE APPOINTMENTS:       - Follow-up visit in 1 month for med check     Edison New MD  [facility] [place]

## 2018-02-06 NOTE — NURSING NOTE
"Chief Complaint   Patient presents with     Diabetes       Initial /74 (Cuff Size: Adult Large)  Pulse 75  Temp 97.3  F (36.3  C) (Tympanic)  Resp 14  Ht 6' 7.5\" (2.019 m)  Wt (!) 375 lb (170.1 kg)  SpO2 98%  BMI 41.72 kg/m2 Estimated body mass index is 41.72 kg/(m^2) as calculated from the following:    Height as of this encounter: 6' 7.5\" (2.019 m).    Weight as of this encounter: 375 lb (170.1 kg).  Medication Reconciliation: complete   Roseann Hampton, CMA    "

## 2018-02-06 NOTE — MR AVS SNAPSHOT
After Visit Summary   2/6/2018    Michael Colorado    MRN: [id number]           Patient Information     Date Of Birth          1955        Visit Information        Provider Department      2/6/2018 11:00 AM Edison New MD [facility]        Today's Diagnoses     Screen for colon cancer    -  1    Type 2 diabetes mellitus with hyperglycemia, with long-term current use of insulin (H)        DDD (degenerative disc disease), cervical        Primary localized osteoarthrosis of lower leg, unspecified laterality        Morbid obesity with BMI of 45.0-49.9, adult (H)           Follow-ups after your visit        Follow-up notes from your care team     Return in about 1 month (around 3/6/2018) for med check.      Your next 10 appointments already scheduled     Feb 12, 2018 10:30 AM CST   Diabetic Education with [facility]   [facility] ([facility])    [address] [place] [id number]   [phone]            Mar 13, 2018   Procedure with Jairo Angel MD   [facility] (--)    [address] [place] [address] [address], [address]  [facility] [address]   [phone]            Mar 13, 2018 10:00 AM CDT   [facility] with Jairo Angel MD   [facility] ([facility])    [facility] ([facility])   [phone]              Future tests that were ordered for you today     Open Future Orders        Priority Expected Expires Ordered    Fecal colorectal cancer screen (FIT) Routine 2/27/2018 5/1/2018 2/6/2018            Who to contact     If you have questions or need follow up information about today's clinic visit or your schedule please contact [facility] directly at [phone].  Normal or non-critical lab and imaging results will be communicated to you by MyChart, letter or phone within 4  "business days after the clinic has received the results. If you do not hear from us within 7 days, please contact the clinic through [employer] or phone. If you have a critical or abnormal lab result, we will notify you by phone as soon as possible.  Submit refill requests through [employer] or call your pharmacy and they will forward the refill request to us. Please allow 3 business days for your refill to be completed.          Additional Information About Your Visit        [employer] Information     [employer] lets you send messages to your doctor, view your test results, renew your prescriptions, schedule appointments and more. To sign up, go to [email or web address] . Click on \"Log in\" on the left side of the screen, which will take you to the Welcome page. Then click on \"Sign up Now\" on the right side of the page.     You will be asked to enter the access code listed below, as well as some personal information. Please follow the directions to create your username and password.     Your access code is: [email or web address]  Expires: 2018 11:16 AM     Your access code will  in 90 days. If you need help or a new code, please call your [place] clinic or [phone].        Care EveryWhere ID     This is your Care EveryWhere ID. This could be used by other organizations to access your [place] medical records  [id number]        Your Vitals Were     Pulse Temperature Respirations Height Pulse Oximetry BMI (Body Mass Index)    75 97.3  F (36.3  C) (Tympanic) 14 6' 7.5\" (2.019 m) 98% 41.72 kg/m2       Blood Pressure from Last 3 Encounters:   18 119/74   18 100/60   18 146/79    Weight from Last 3 Encounters:   18 (!) 375 lb (170.1 kg)   18 (!) 360 lb (163.3 kg)   18 (!) 366 lb (166 kg)                 Today's Medication Changes          These changes are accurate as of 18 11:22 AM.  If you have any questions, ask your nurse or doctor.               Start taking these medicines.     "    Dose/Directions    insulin glargine 100 UNIT/ML injection   Commonly known as:  LANTUS SOLOSTAR   Used for:  Type 2 diabetes mellitus with hyperglycemia, with long-term current use of insulin (H)   Started by:  Edison New MD        Dose:  49 Units   Inject 49 Units Subcutaneous At Bedtime   Quantity:  15 mL   Refills:  1            Where to get your medicines      Some of these will need a paper prescription and others can be bought over the counter.  Ask your nurse if you have questions.     Bring a paper prescription for each of these medications     HYDROcodone-acetaminophen 5-325 MG per tablet    morphine 30 MG 12 hr tablet       You don't need a prescription for these medications     insulin glargine 100 UNIT/ML injection                Primary Care Provider Office Phone # Fax #    Edison New [phone]        [facility] [address]        Equal Access to Services     [facility]: Hadii aad ku hadasho Soomaali, waaxda luqadaha, qaybta kaalmada adeegyada, wing bangin hayaan lana gutierrez . [facility] [phone].    ATENCIÓN: Si habla español, tiene a howe disposición servicios gratuitos de asistencia lingüística. [facility] [phone].    We comply with applicable federal civil rights laws and [place] laws. We do not discriminate on the basis of race, color, national origin, age, disability, sex, sexual orientation, or gender identity.            Thank you!     Thank you for choosing [facility] [place] [facility]  for your care. Our goal is always to provide you with excellent care. Hearing back from our patients is one way we can continue to improve our services. Please take a few minutes to complete the written survey that you may receive in the mail after your visit with us. Thank you!             Your Updated Medication List - Protect others around you: Learn how to safely use, store and throw away your medicines at www.disposemymeds.org.          This  list is accurate as of 2/6/18 11:22 AM.  Always use your most recent med list.                   Brand Name Dispense Instructions for use Diagnosis    aspirin 81 MG tablet      Take by mouth daily    Type II or unspecified type diabetes mellitus without mention of complication, uncontrolled       blood glucose monitoring lancets     100 each    Use to test blood sugar 3 times daily or as directed.  Ok to substitute alternative if insurance prefers.    Type 2 diabetes mellitus with hyperglycemia, with long-term current use of insulin (H)       * blood glucose monitoring test strip    no brand specified    100 strip    Use to test blood sugars 4 times daily or as directed please provide per formulary approval patient states Verio.    Poorly controlled type II diabetes mellitus with renal complication (H), Morbid obesity due to excess calories (H)       * blood glucose monitoring test strip    ONETOUCH VERIO IQ    100 each    Use to test blood sugars 3 times daily or as directed.    Type 2 diabetes mellitus with hyperglycemia, with long-term current use of insulin (H)       HYDROcodone-acetaminophen 5-325 MG per tablet   Start taking on:  2/8/2018    NORCO    60 tablet    Take 1-2 tablets by mouth every 12 hours maximum 2 tablet(s) per day    DDD (degenerative disc disease), cervical, Primary localized osteoarthrosis of lower leg, unspecified laterality       insulin aspart 100 UNIT/ML injection    NovoLOG FLEXPEN    15 mL    -199, then 2 units -249, then 4 units -299, then 6 units  -349, then 8 units  -399, then 10 units  -449, then 12 units  -499, then 14 units    Type 2 diabetes mellitus with hyperglycemia, with long-term current use of insulin (H)       insulin glargine 100 UNIT/ML injection    LANTUS SOLOSTAR    15 mL    Inject 49 Units Subcutaneous At Bedtime    Type 2 diabetes mellitus with hyperglycemia, with long-term current use of insulin (H)       insulin pen needle  32G X 4 MM     150 each    Use 4 pen needles daily or as directed.    Type 2 diabetes mellitus with hyperglycemia, with long-term current use of insulin (H)       metFORMIN 1000 MG tablet    GLUCOPHAGE    180 tablet    Take 1 tablet (1,000 mg) by mouth 2 times daily (with meals)    Poorly controlled type 2 diabetes mellitus (H)       morphine 30 MG 12 hr tablet   Start taking on:  2/8/2018    MS CONTIN    60 tablet    Take 1 tablet (30 mg) by mouth every 12 hours maximum 2 tablet(s) per day    DDD (degenerative disc disease), cervical, Primary localized osteoarthrosis of lower leg, unspecified laterality       TYLENOL PM EXTRA STRENGTH PO      Take by mouth At Bedtime        * Notice:  This list has 2 medication(s) that are the same as other medications prescribed for you. Read the directions carefully, and ask your doctor or other care provider to review them with you.

## 2018-03-06 ENCOUNTER — OFFICE VISIT (OUTPATIENT)
Dept: FAMILY MEDICINE | Facility: CLINIC | Age: 63
End: 2018-03-06
Payer: COMMERCIAL

## 2018-03-06 VITALS
BODY MASS INDEX: 36.45 KG/M2 | OXYGEN SATURATION: 97 % | HEART RATE: 96 BPM | HEIGHT: 78 IN | SYSTOLIC BLOOD PRESSURE: 134 MMHG | WEIGHT: 315 LBS | DIASTOLIC BLOOD PRESSURE: 82 MMHG | TEMPERATURE: 97.9 F

## 2018-03-06 DIAGNOSIS — Z01.818 PREOP GENERAL PHYSICAL EXAM: Primary | ICD-10-CM

## 2018-03-06 DIAGNOSIS — M50.30 DDD (DEGENERATIVE DISC DISEASE), CERVICAL: ICD-10-CM

## 2018-03-06 DIAGNOSIS — M17.10 PRIMARY LOCALIZED OSTEOARTHROSIS OF LOWER LEG, UNSPECIFIED LATERALITY: ICD-10-CM

## 2018-03-06 DIAGNOSIS — K59.01 SLOW TRANSIT CONSTIPATION: ICD-10-CM

## 2018-03-06 LAB
ERYTHROCYTE [DISTWIDTH] IN BLOOD BY AUTOMATED COUNT: 13.3 % (ref 10–15)
HCT VFR BLD AUTO: 48 % (ref 40–53)
HGB BLD-MCNC: 15.9 G/DL (ref 13.3–17.7)
MCH RBC QN AUTO: 28.6 PG (ref 26.5–33)
MCHC RBC AUTO-ENTMCNC: 33.1 G/DL (ref 31.5–36.5)
MCV RBC AUTO: 86 FL (ref 78–100)
PLATELET # BLD AUTO: 185 10E9/L (ref 150–450)
RBC # BLD AUTO: 5.56 10E12/L (ref 4.4–5.9)
WBC # BLD AUTO: 6.7 10E9/L (ref 4–11)

## 2018-03-06 PROCEDURE — 99215 OFFICE O/P EST HI 40 MIN: CPT | Performed by: FAMILY MEDICINE

## 2018-03-06 PROCEDURE — 93000 ELECTROCARDIOGRAM COMPLETE: CPT | Performed by: FAMILY MEDICINE

## 2018-03-06 PROCEDURE — 36415 COLL VENOUS BLD VENIPUNCTURE: CPT | Performed by: FAMILY MEDICINE

## 2018-03-06 PROCEDURE — 80048 BASIC METABOLIC PNL TOTAL CA: CPT | Performed by: FAMILY MEDICINE

## 2018-03-06 PROCEDURE — 85027 COMPLETE CBC AUTOMATED: CPT | Performed by: FAMILY MEDICINE

## 2018-03-06 RX ORDER — MORPHINE SULFATE 30 MG/1
30 TABLET, FILM COATED, EXTENDED RELEASE ORAL EVERY 12 HOURS
Qty: 60 TABLET | Refills: 0 | Status: ON HOLD | OUTPATIENT
Start: 2018-03-10 | End: 2018-03-13

## 2018-03-06 RX ORDER — POLYETHYLENE GLYCOL 3350 17 G/17G
1 POWDER, FOR SOLUTION ORAL DAILY
Qty: 510 G | Refills: 1 | Status: SHIPPED | OUTPATIENT
Start: 2018-03-06 | End: 2018-08-08

## 2018-03-06 RX ORDER — HYDROCODONE BITARTRATE AND ACETAMINOPHEN 5; 325 MG/1; MG/1
1-2 TABLET ORAL EVERY 12 HOURS
Qty: 60 TABLET | Refills: 0 | Status: ON HOLD | OUTPATIENT
Start: 2018-03-10 | End: 2018-03-13

## 2018-03-06 NOTE — PROGRESS NOTES
[facility] [place]  [address] [facility] [facility] [address]  [phone]  Dept: [phone]    PRE-OP EVALUATION:  Today's date: 3/6/2018    Michael Colorado (: 1955) presents for pre-operative evaluation assessment as requested by Dr. Angel.  He requires evaluation and anesthesia risk assessment prior to undergoing surgery/procedure for treatment of hernia .    Proposed Surgery/ Procedure: hernia  Date of Surgery/ Procedure: 3/13/18  Time of Surgery/ Procedure: 10:00  Hospital/Surgical Facility: [facility]    Primary Physician: Edison New  Type of Anesthesia Anticipated: General    Patient has a Health Care Directive or Living Will:  YES     1. NO - Do you have a history of heart attack, stroke, stent, bypass or surgery on an artery in the head, neck, heart or legs?  2. NO - Do you ever have any pain or discomfort in your chest?  3. NO - Do you have a history of  Heart Failure?  4. NO - Are you troubled by shortness of breath when: walking on the level, up a slight hill or at night?  5. NO - Do you currently have a cold, bronchitis or other respiratory infection?  6. NO - Do you have a cough, shortness of breath or wheezing?  7. NO - Do you sometimes get pains in the calves of your legs when you walk?  8. NO - Do you or anyone in your family have previous history of blood clots?  9. NO - Do you or does anyone in your family have a serious bleeding problem such as prolonged bleeding following surgeries or cuts?  10. NO - Have you ever had problems with anemia or been told to take iron pills?  11. NO - Have you had any abnormal blood loss such as black, tarry or bloody stools, or abnormal vaginal bleeding?  12. NO - Have you ever had a blood transfusion?  13. NO - Have you or any of your relatives ever had problems with anesthesia?  14. NO - Do you have sleep apnea, excessive snoring or daytime drowsiness?  15. NO - Do you have any prosthetic heart valves?  16. NO - Do you  have prosthetic joints?  17. NO - Is there any chance that you may be pregnant?      HPI:     See problem list for active medical problems.  Problems all longstanding and stable, except as noted/documented.  See ROS for pertinent symptoms related to these conditions.                                                                                                  .    MEDICAL HISTORY:     Patient Active Problem List    Diagnosis Date Noted     Type 2 diabetes mellitus with hyperglycemia (H) 10/28/2015     Priority: Medium     Tobacco use disorder 11/07/2014     Priority: Medium     Chronic pain syndrome 11/06/2014     Priority: Medium     Hepatitis C 11/06/2014     Priority: Medium     Obstructive sleep apnea 11/06/2014     Priority: Medium     Morbid obesity (H) 01/06/2014     Priority: Medium     Morbid obesity with BMI of 45.0-49.9, adult (H)      Priority: Medium     Smoker 09/21/2012     Priority: Medium     Primary localized osteoarthrosis, lower leg 08/27/2012     Priority: Medium     Advanced directives, counseling/discussion 01/26/2012     Priority: Medium     CARDIOVASCULAR SCREENING; LDL GOAL LESS THAN 130 10/31/2010     Priority: Medium     Tinnitus 02/23/2009     Priority: Medium     Other and unspecified disc disorder 02/10/2005     Priority: Medium     Intervertebral disc disorders  Diagnosis updated by automated process. Provider to review and confirm.        Past Medical History:   Diagnosis Date     Depressive disorder, not elsewhere classified     Depression (non-psychotic)     Morbid obesity (H) 1/6/2014     Morbid obesity with BMI of 45.0-49.9, adult (H)      Morbid obesity with BMI of 45.0-49.9, adult (H)      Other and unspecified disc disorder of unspecified region     Intervertebral disc disorders     Type 2 diabetes mellitus with hyperglycemia (H) 10/28/2015     Past Surgical History:   Procedure Laterality Date     SURGICAL HISTORY OF -   10/03    Lt knee partial medial meniscectomy      Current Outpatient Prescriptions   Medication Sig Dispense Refill     morphine (MS CONTIN) 30 MG 12 hr tablet Take 1 tablet (30 mg) by mouth every 12 hours maximum 2 tablet(s) per day 60 tablet 0     HYDROcodone-acetaminophen (NORCO) 5-325 MG per tablet Take 1-2 tablets by mouth every 12 hours maximum 2 tablet(s) per day 60 tablet 0     insulin glargine (LANTUS SOLOSTAR) 100 UNIT/ML injection Inject 49 Units Subcutaneous At Bedtime 15 mL 1     insulin pen needle 32G X 4 MM Use 4 pen needles daily or as directed. 150 each 3     blood glucose monitoring (ONETOUCH VERIO IQ) test strip Use to test blood sugars 3 times daily or as directed. 100 each 6     blood glucose monitoring (ONE TOUCH DELICA) lancets Use to test blood sugar 3 times daily or as directed.  Ok to substitute alternative if insurance prefers. 100 each 6     metFORMIN (GLUCOPHAGE) 1000 MG tablet Take 1 tablet (1,000 mg) by mouth 2 times daily (with meals) 180 tablet 1     insulin aspart (NOVOLOG FLEXPEN) 100 UNIT/ML injection -199, then 2 units  -249, then 4 units  -299, then 6 units   -349, then 8 units   -399, then 10 units   -449, then 12 units   -499, then 14 units 15 mL 1     blood glucose monitoring (NO BRAND SPECIFIED) test strip Use to test blood sugars 4 times daily or as directed please provide per formulary approval patient states Verio. 100 strip 3     aspirin 81 MG tablet Take by mouth daily       Diphenhydramine-APAP, sleep, (TYLENOL PM EXTRA STRENGTH PO) Take by mouth At Bedtime       OTC products: None, except as noted above    Allergies   Allergen Reactions     No Known Drug Allergies       Latex Allergy: NO    Social History   Substance Use Topics     Smoking status: Current Every Day Smoker     Packs/day: 1.00     Years: 30.00     Types: Cigarettes     Smokeless tobacco: Never Used     Alcohol use No      Comment: quit      History   Drug Use No       REVIEW OF SYSTEMS:   CONSTITUTIONAL:  "NEGATIVE for fever, chills, change in weight  ENT/MOUTH: NEGATIVE for ear, mouth and throat problems  RESP: NEGATIVE for significant cough or SOB  CV: NEGATIVE for chest pain, palpitations or peripheral edema    EXAM:   /82 (BP Location: Right arm, Patient Position: Sitting, Cuff Size: Adult Large)  Pulse 96  Temp 97.9  F (36.6  C) (Tympanic)  Ht 6' 7.5\" (2.019 m)  Wt (!) 372 lb 3.2 oz (168.8 kg)  SpO2 97%  BMI 41.4 kg/m2  GENERAL APPEARANCE: healthy, alert and no distress  HENT: ear canals and TM's normal and nose and mouth without ulcers or lesions  RESP: lungs clear to auscultation - no rales, rhonchi or wheezes  CV: regular rate and rhythm, normal S1 S2, no S3 or S4 and no murmur, click or rub   ABDOMEN: soft, nontender, no HSM or masses and bowel sounds normal  NEURO: Normal strength and tone, sensory exam grossly normal, mentation intact and speech normal    DIAGNOSTICS:     Labs Resulted Today:   Results for orders placed or performed in visit on 03/06/18   CBC with platelets   Result Value Ref Range    WBC 6.7 4.0 - 11.0 10e9/L    RBC Count 5.56 4.4 - 5.9 10e12/L    Hemoglobin 15.9 13.3 - 17.7 g/dL    Hematocrit 48.0 40.0 - 53.0 %    MCV 86 78 - 100 fl    MCH 28.6 26.5 - 33.0 pg    MCHC 33.1 31.5 - 36.5 g/dL    RDW 13.3 10.0 - 15.0 %    Platelet Count 185 150 - 450 10e9/L   Basic metabolic panel  (Ca, Cl, CO2, Creat, Gluc, K, Na, BUN)   Result Value Ref Range    Sodium 133 133 - 144 mmol/L    Potassium 4.1 3.4 - 5.3 mmol/L    Chloride 101 94 - 109 mmol/L    Carbon Dioxide 26 20 - 32 mmol/L    Anion Gap 6 3 - 14 mmol/L    Glucose 105 (H) 70 - 99 mg/dL    Urea Nitrogen 9 7 - 30 mg/dL    Creatinine 0.72 0.66 - 1.25 mg/dL    GFR Estimate >90 >60 mL/min/1.7m2    GFR Estimate If Black >90 >60 mL/min/1.7m2    Calcium 8.7 8.5 - 10.1 mg/dL       Recent Labs   Lab Test  01/09/18   1122  02/03/17   1047   02/05/15   0837  04/29/13   1138   05/17/12   0904   HGB   --    --    --   16.0  15.7   --   16.7 "   PLT   --    --    --   197   --    --   191   NA  132*  137   < >  137  139   --   141   POTASSIUM  4.3  4.6   < >  4.0  4.1   --   4.7   CR  0.58*  0.62*   < >  0.64*  0.60*   --   0.81   A1C  11.1*  9.3*   < >  7.0*  6.4*   < >  5.7    < > = values in this interval not displayed.      EKG: sinus rhythm non specific T/ST wave change  IMPRESSION:   Reason for surgery/procedure: hernia   Diagnosis/reason for consult: ventral hernia/laparoscopic herniorrhaphy      The proposed surgical procedure is considered LOW risk.    REVISED CARDIAC RISK INDEX  The patient has the following serious cardiovascular risks for perioperative complications such as (MI, PE, VFib and 3  AV Block):  High risk surgery (>5% cardiac complication risk)  Diabetes Mellitus (on Insulin)  INTERPRETATION: 1 risks: Class II (low risk - 0.9% complication rate)    The patient has the following additional risks for perioperative complications:  The 10-year ASCVD risk score ([place] JAIMEE Jr, et al., 2013) is: 35.3%    Values used to calculate the score:      Age: 63 years      Sex: Male      Is Non- : No      Diabetic: Yes      Tobacco smoker: Yes      Systolic Blood Pressure: 134 mmHg      Is BP treated: No      HDL Cholesterol: 30 mg/dL      Total Cholesterol: 184 mg/dL  High tolerance to opioid analgesics due to chronic pain medicine usage       ICD-10-CM    1. Preop general physical exam Z01.818 EKG 12-lead complete w/read - Clinics     CBC with platelets     Basic metabolic panel  (Ca, Cl, CO2, Creat, Gluc, K, Na, BUN)   2. DDD (degenerative disc disease), cervical M50.30 morphine (MS CONTIN) 30 MG 12 hr tablet     HYDROcodone-acetaminophen (NORCO) 5-325 MG per tablet   3. Primary localized osteoarthrosis of lower leg, unspecified laterality M17.10 morphine (MS CONTIN) 30 MG 12 hr tablet     HYDROcodone-acetaminophen (NORCO) 5-325 MG per tablet   4. Slow transit constipation K59.01 polyethylene glycol (MIRALAX) powder        RECOMMENDATIONS:     --Consult hospital rounder / IM to assist post-op medical management    Obstructive Sleep Apnea (or suspected sleep apnea)  Hospital staff are advised to monitor for sleep related oxygen desaturations due to suspicion of MIRNA      --Patient is to take all scheduled medications on the day of surgery EXCEPT for modifications listed below.    Diabetes Medication Use  -----Hold usual oral and non-insulin diabetic meds (e.g. Metformin, Actos, Glipizide) while NPO.   -----Take 80% of long acting insulin (e.g. Lantus, NPH) while NPO (fasting)  -----Hold short acting insulin (e.g. Novolog, Humalog) while NPO (fasting)  -----Use usual sliding scale correction of insulin while NPO (fasting)      Anticoagulant or Antiplatelet Medication Use  ASPIRIN: Discontinue ASA 7-10 days prior to procedure to reduce bleeding risk.  It should be resumed post-operatively.  NSAIDS: Ibuprofen (Motrin):         Stop one day prior to surgery        ACE Inhibitor or Angiotensin Receptor Blocker (ARB) Use  Ace inhibitor or Angiotensin Receptor Blocker (ARB) and will continue this medication due to the higher risk of uncontrolled perioerative hypertension (e.g. neurosurgical procedure)      APPROVAL GIVEN to proceed with proposed procedure, without further diagnostic evaluation       Signed Electronically by: Edison New MD    Copy of this evaluation report is provided to requesting physician.    [place] Preop Guidelines

## 2018-03-06 NOTE — MR AVS SNAPSHOT
After Visit Summary   3/6/2018    Michael Colorado    MRN: [id number]           Patient Information     Date Of Birth          1955        Visit Information        Provider Department      3/6/2018 1:20 PM Edison New MD [facility] [place]        Today's Diagnoses     Preop general physical exam    -  1    DDD (degenerative disc disease), cervical        Primary localized osteoarthrosis of lower leg, unspecified laterality        Slow transit constipation          Care Instructions      Before Your Surgery      Call your surgeon if there is any change in your health. This includes signs of a cold or flu (such as a sore throat, runny nose, cough, rash or fever).    Do not smoke, drink alcohol or take over the counter medicine (unless your surgeon or primary care doctor tells you to) for the 24 hours before and after surgery.    If you take prescribed drugs: Follow your doctor s orders about which medicines to take and which to stop until after surgery.    Eating and drinking prior to surgery: follow the instructions from your surgeon    Take a shower or bath the night before surgery. Use the soap your surgeon gave you to gently clean your skin. If you do not have soap from your surgeon, use your regular soap. Do not shave or scrub the surgery site.  Wear clean pajamas and have clean sheets on your bed.           Follow-ups after your visit        Follow-up notes from your care team     Return in about 3 months (around 6/6/2018) for DM recheck.      Your next 10 appointments already scheduled     Mar 12, 2018 10:30 AM CDT   Diabetic Education with [facility]   [facility] ([facility] [place])    [address] [place] [address]   [phone]            Mar 13, 2018   Procedure with Jairo Angel MD   [facility] (--)    [address]  [facility] [id number] [phone]            Mar  "2018 10:00 AM CDT   [facility] Same Day Surgery with Jairo Angel MD   [facility] Surgery [facility]uling ([facility])    [facility] ([facility])   [phone]              Who to contact     If you have questions or need follow up information about today's clinic visit or your schedule please contact [facility] directly at [phone].  Normal or non-critical lab and imaging results will be communicated to you by TheCommentorhart, letter or phone within 4 business days after the clinic has received the results. If you do not hear from us within 7 days, please contact the clinic through Upliket or phone. If you have a critical or abnormal lab result, we will notify you by phone as soon as possible.  Submit refill requests through Divide or call your pharmacy and they will forward the refill request to us. Please allow 3 business days for your refill to be completed.          Additional Information About Your Visit        Divide Information     Divide lets you send messages to your doctor, view your test results, renew your prescriptions, schedule appointments and more. To sign up, go to [email or web address] . Click on \"Log in\" on the left side of the screen, which will take you to the Welcome page. Then click on \"Sign up Now\" on the right side of the page.     You will be asked to enter the access code listed below, as well as some personal information. Please follow the directions to create your username and password.     Your access code is: [email or web address]  Expires: 2018 11:16 AM     Your access code will  in 90 days. If you need help or a new code, please call your [place] clinic or [phone].        Care EveryWhere ID     This is your Care EveryWhere ID. This could be used by other organizations to access your [facility] medical records  [id number]        Your Vitals Were     Pulse Temperature Height Pulse " "Oximetry BMI (Body Mass Index)       96 97.9  F (36.6  C) (Tympanic) 6' 7.5\" (2.019 m) 97% 41.4 kg/m2        Blood Pressure from Last 3 Encounters:   03/06/18 134/82   02/06/18 119/74   02/05/18 100/60    Weight from Last 3 Encounters:   03/06/18 (!) 372 lb 3.2 oz (168.8 kg)   02/06/18 (!) 375 lb (170.1 kg)   02/05/18 (!) 360 lb (163.3 kg)              We Performed the Following     Basic metabolic panel  (Ca, Cl, CO2, Creat, Gluc, K, Na, BUN)     CBC with platelets     EKG 12-lead complete w/read - Clinics     EKG 12-lead complete w/read - Clinics          Today's Medication Changes          These changes are accurate as of 3/6/18 11:59 PM.  If you have any questions, ask your nurse or doctor.               Start taking these medicines.        Dose/Directions    polyethylene glycol powder   Commonly known as:  MIRALAX   Used for:  Slow transit constipation   Started by:  Edison New MD        Dose:  1 capful   Take 17 g (1 capful) by mouth daily   Quantity:  510 g   Refills:  1            Where to get your medicines      These medications were sent to [facility] [address] [place] [address] AT [facility] [address] [id number] [facility] [address]     Phone:  [phone]     polyethylene glycol powder         Some of these will need a paper prescription and others can be bought over the counter.  Ask your nurse if you have questions.     Bring a paper prescription for each of these medications     HYDROcodone-acetaminophen 5-325 MG per tablet    morphine 30 MG 12 hr tablet                Primary Care Provider Office Phone # Fax #    Edison New [phone]       [address] [place] [address]        Equal Access to Services     JESSICA PALUMBO AH: Wilian sahni Sojerome, waaxda luqadaha, qaybnoé kaalmatiago russell, wing wang. [facility] [phone].    ATENCIÓN: Si ashok verdugo a howe disposición " servicios gratuitos de asistencia lingüística. Rock matthews [phone].    We comply with applicable federal civil rights laws and [place] laws. We do not discriminate on the basis of race, color, national origin, age, disability, sex, sexual orientation, or gender identity.            Thank you!     Thank you for choosing [facility] [place]  for your care. Our goal is always to provide you with excellent care. Hearing back from our patients is one way we can continue to improve our services. Please take a few minutes to complete the written survey that you may receive in the mail after your visit with us. Thank you!             Your Updated Medication List - Protect others around you: Learn how to safely use, store and throw away your medicines at www.disposemymeds.org.          This list is accurate as of 3/6/18 11:59 PM.  Always use your most recent med list.                   Brand Name Dispense Instructions for use Diagnosis    aspirin 81 MG tablet      Take by mouth daily    Type II or unspecified type diabetes mellitus without mention of complication, uncontrolled       blood glucose monitoring lancets     100 each    Use to test blood sugar 3 times daily or as directed.  Ok to substitute alternative if insurance prefers.    Type 2 diabetes mellitus with hyperglycemia, with long-term current use of insulin (H)       * blood glucose monitoring test strip    no brand specified    100 strip    Use to test blood sugars 4 times daily or as directed please provide per formulary approval patient states Verio.    Poorly controlled type II diabetes mellitus with renal complication (H), Morbid obesity due to excess calories (H)       * blood glucose monitoring test strip    ONETOUCH VERIO IQ    100 each    Use to test blood sugars 3 times daily or as directed.    Type 2 diabetes mellitus with hyperglycemia, with long-term current use of insulin (H)       HYDROcodone-acetaminophen 5-325 MG per tablet   Start  taking on:  3/10/2018    NORCO    60 tablet    Take 1-2 tablets by mouth every 12 hours maximum 2 tablet(s) per day    DDD (degenerative disc disease), cervical, Primary localized osteoarthrosis of lower leg, unspecified laterality       insulin aspart 100 UNIT/ML injection    NovoLOG FLEXPEN    15 mL    -199, then 2 units -249, then 4 units -299, then 6 units  -349, then 8 units  -399, then 10 units  -449, then 12 units  -499, then 14 units    Type 2 diabetes mellitus with hyperglycemia, with long-term current use of insulin (H)       insulin glargine 100 UNIT/ML injection    LANTUS SOLOSTAR    15 mL    Inject 49 Units Subcutaneous At Bedtime    Type 2 diabetes mellitus with hyperglycemia, with long-term current use of insulin (H)       insulin pen needle 32G X 4 MM     150 each    Use 4 pen needles daily or as directed.    Type 2 diabetes mellitus with hyperglycemia, with long-term current use of insulin (H)       metFORMIN 1000 MG tablet    GLUCOPHAGE    180 tablet    Take 1 tablet (1,000 mg) by mouth 2 times daily (with meals)    Poorly controlled type 2 diabetes mellitus (H)       morphine 30 MG 12 hr tablet   Start taking on:  3/10/2018    MS CONTIN    60 tablet    Take 1 tablet (30 mg) by mouth every 12 hours maximum 2 tablet(s) per day    DDD (degenerative disc disease), cervical, Primary localized osteoarthrosis of lower leg, unspecified laterality       polyethylene glycol powder    MIRALAX    510 g    Take 17 g (1 capful) by mouth daily    Slow transit constipation       TYLENOL PM EXTRA STRENGTH PO      Take by mouth At Bedtime        * Notice:  This list has 2 medication(s) that are the same as other medications prescribed for you. Read the directions carefully, and ask your doctor or other care provider to review them with you.

## 2018-03-06 NOTE — NURSING NOTE
"Chief Complaint   Patient presents with     Pre-Op Exam       Initial /82 (BP Location: Right arm, Patient Position: Sitting, Cuff Size: Adult Large)  Pulse 96  Temp 97.9  F (36.6  C) (Tympanic)  Ht 6' 7.5\" (2.019 m)  Wt (!) 372 lb 3.2 oz (168.8 kg)  SpO2 97%  BMI 41.4 kg/m2 Estimated body mass index is 41.4 kg/(m^2) as calculated from the following:    Height as of this encounter: 6' 7.5\" (2.019 m).    Weight as of this encounter: 372 lb 3.2 oz (168.8 kg).  Medication Reconciliation: complete   Norman Macedo MA   "

## 2018-03-07 LAB
ANION GAP SERPL CALCULATED.3IONS-SCNC: 6 MMOL/L (ref 3–14)
BUN SERPL-MCNC: 9 MG/DL (ref 7–30)
CALCIUM SERPL-MCNC: 8.7 MG/DL (ref 8.5–10.1)
CHLORIDE SERPL-SCNC: 101 MMOL/L (ref 94–109)
CO2 SERPL-SCNC: 26 MMOL/L (ref 20–32)
CREAT SERPL-MCNC: 0.72 MG/DL (ref 0.66–1.25)
GFR SERPL CREATININE-BSD FRML MDRD: >90 ML/MIN/1.7M2
GLUCOSE SERPL-MCNC: 105 MG/DL (ref 70–99)
POTASSIUM SERPL-SCNC: 4.1 MMOL/L (ref 3.4–5.3)
SODIUM SERPL-SCNC: 133 MMOL/L (ref 133–144)

## 2018-03-09 NOTE — H&P (VIEW-ONLY)
[facility] [place] [facility]  [address] [facility] [address]  [phone]  Dept: [phone]    PRE-OP EVALUATION:  Today's date: 3/6/2018    Michael Colorado (: 1955) presents for pre-operative evaluation assessment as requested by Dr. Angel.  He requires evaluation and anesthesia risk assessment prior to undergoing surgery/procedure for treatment of hernia .    Proposed Surgery/ Procedure: hernia  Date of Surgery/ Procedure: 3/13/18  Time of Surgery/ Procedure: 10:00  Hospital/Surgical Facility: [facility]    Primary Physician: Edison New  Type of Anesthesia Anticipated: General    Patient has a Health Care Directive or Living Will:  YES     1. NO - Do you have a history of heart attack, stroke, stent, bypass or surgery on an artery in the head, neck, heart or legs?  2. NO - Do you ever have any pain or discomfort in your chest?  3. NO - Do you have a history of  Heart Failure?  4. NO - Are you troubled by shortness of breath when: walking on the level, up a slight hill or at night?  5. NO - Do you currently have a cold, bronchitis or other respiratory infection?  6. NO - Do you have a cough, shortness of breath or wheezing?  7. NO - Do you sometimes get pains in the calves of your legs when you walk?  8. NO - Do you or anyone in your family have previous history of blood clots?  9. NO - Do you or does anyone in your family have a serious bleeding problem such as prolonged bleeding following surgeries or cuts?  10. NO - Have you ever had problems with anemia or been told to take iron pills?  11. NO - Have you had any abnormal blood loss such as black, tarry or bloody stools, or abnormal vaginal bleeding?  12. NO - Have you ever had a blood transfusion?  13. NO - Have you or any of your relatives ever had problems with anesthesia?  14. NO - Do you have sleep apnea, excessive snoring or daytime drowsiness?  15. NO - Do you have any prosthetic heart valves?  16. NO - Do you  have prosthetic joints?  17. NO - Is there any chance that you may be pregnant?      HPI:     See problem list for active medical problems.  Problems all longstanding and stable, except as noted/documented.  See ROS for pertinent symptoms related to these conditions.                                                                                                  .    MEDICAL HISTORY:     Patient Active Problem List    Diagnosis Date Noted     Type 2 diabetes mellitus with hyperglycemia (H) 10/28/2015     Priority: Medium     Tobacco use disorder 11/07/2014     Priority: Medium     Chronic pain syndrome 11/06/2014     Priority: Medium     Hepatitis C 11/06/2014     Priority: Medium     Obstructive sleep apnea 11/06/2014     Priority: Medium     Morbid obesity (H) 01/06/2014     Priority: Medium     Morbid obesity with BMI of 45.0-49.9, adult (H)      Priority: Medium     Smoker 09/21/2012     Priority: Medium     Primary localized osteoarthrosis, lower leg 08/27/2012     Priority: Medium     Advanced directives, counseling/discussion 01/26/2012     Priority: Medium     CARDIOVASCULAR SCREENING; LDL GOAL LESS THAN 130 10/31/2010     Priority: Medium     Tinnitus 02/23/2009     Priority: Medium     Other and unspecified disc disorder 02/10/2005     Priority: Medium     Intervertebral disc disorders  Diagnosis updated by automated process. Provider to review and confirm.        Past Medical History:   Diagnosis Date     Depressive disorder, not elsewhere classified     Depression (non-psychotic)     Morbid obesity (H) 1/6/2014     Morbid obesity with BMI of 45.0-49.9, adult (H)      Morbid obesity with BMI of 45.0-49.9, adult (H)      Other and unspecified disc disorder of unspecified region     Intervertebral disc disorders     Type 2 diabetes mellitus with hyperglycemia (H) 10/28/2015     Past Surgical History:   Procedure Laterality Date     SURGICAL HISTORY OF -   10/03    Lt knee partial medial meniscectomy      Current Outpatient Prescriptions   Medication Sig Dispense Refill     morphine (MS CONTIN) 30 MG 12 hr tablet Take 1 tablet (30 mg) by mouth every 12 hours maximum 2 tablet(s) per day 60 tablet 0     HYDROcodone-acetaminophen (NORCO) 5-325 MG per tablet Take 1-2 tablets by mouth every 12 hours maximum 2 tablet(s) per day 60 tablet 0     insulin glargine (LANTUS SOLOSTAR) 100 UNIT/ML injection Inject 49 Units Subcutaneous At Bedtime 15 mL 1     insulin pen needle 32G X 4 MM Use 4 pen needles daily or as directed. 150 each 3     blood glucose monitoring (ONETOUCH VERIO IQ) test strip Use to test blood sugars 3 times daily or as directed. 100 each 6     blood glucose monitoring (ONE TOUCH DELICA) lancets Use to test blood sugar 3 times daily or as directed.  Ok to substitute alternative if insurance prefers. 100 each 6     metFORMIN (GLUCOPHAGE) 1000 MG tablet Take 1 tablet (1,000 mg) by mouth 2 times daily (with meals) 180 tablet 1     insulin aspart (NOVOLOG FLEXPEN) 100 UNIT/ML injection -199, then 2 units  -249, then 4 units  -299, then 6 units   -349, then 8 units   -399, then 10 units   -449, then 12 units   -499, then 14 units 15 mL 1     blood glucose monitoring (NO BRAND SPECIFIED) test strip Use to test blood sugars 4 times daily or as directed please provide per formulary approval patient states Verio. 100 strip 3     aspirin 81 MG tablet Take by mouth daily       Diphenhydramine-APAP, sleep, (TYLENOL PM EXTRA STRENGTH PO) Take by mouth At Bedtime       OTC products: None, except as noted above    Allergies   Allergen Reactions     No Known Drug Allergies       Latex Allergy: NO    Social History   Substance Use Topics     Smoking status: Current Every Day Smoker     Packs/day: 1.00     Years: 30.00     Types: Cigarettes     Smokeless tobacco: Never Used     Alcohol use No      Comment: quit      History   Drug Use No       REVIEW OF SYSTEMS:   CONSTITUTIONAL:  "NEGATIVE for fever, chills, change in weight  ENT/MOUTH: NEGATIVE for ear, mouth and throat problems  RESP: NEGATIVE for significant cough or SOB  CV: NEGATIVE for chest pain, palpitations or peripheral edema    EXAM:   /82 (BP Location: Right arm, Patient Position: Sitting, Cuff Size: Adult Large)  Pulse 96  Temp 97.9  F (36.6  C) (Tympanic)  Ht 6' 7.5\" (2.019 m)  Wt (!) 372 lb 3.2 oz (168.8 kg)  SpO2 97%  BMI 41.4 kg/m2  GENERAL APPEARANCE: healthy, alert and no distress  HENT: ear canals and TM's normal and nose and mouth without ulcers or lesions  RESP: lungs clear to auscultation - no rales, rhonchi or wheezes  CV: regular rate and rhythm, normal S1 S2, no S3 or S4 and no murmur, click or rub   ABDOMEN: soft, nontender, no HSM or masses and bowel sounds normal  NEURO: Normal strength and tone, sensory exam grossly normal, mentation intact and speech normal    DIAGNOSTICS:     Labs Resulted Today:   Results for orders placed or performed in visit on 03/06/18   CBC with platelets   Result Value Ref Range    WBC 6.7 4.0 - 11.0 10e9/L    RBC Count 5.56 4.4 - 5.9 10e12/L    Hemoglobin 15.9 13.3 - 17.7 g/dL    Hematocrit 48.0 40.0 - 53.0 %    MCV 86 78 - 100 fl    MCH 28.6 26.5 - 33.0 pg    MCHC 33.1 31.5 - 36.5 g/dL    RDW 13.3 10.0 - 15.0 %    Platelet Count 185 150 - 450 10e9/L   Basic metabolic panel  (Ca, Cl, CO2, Creat, Gluc, K, Na, BUN)   Result Value Ref Range    Sodium 133 133 - 144 mmol/L    Potassium 4.1 3.4 - 5.3 mmol/L    Chloride 101 94 - 109 mmol/L    Carbon Dioxide 26 20 - 32 mmol/L    Anion Gap 6 3 - 14 mmol/L    Glucose 105 (H) 70 - 99 mg/dL    Urea Nitrogen 9 7 - 30 mg/dL    Creatinine 0.72 0.66 - 1.25 mg/dL    GFR Estimate >90 >60 mL/min/1.7m2    GFR Estimate If Black >90 >60 mL/min/1.7m2    Calcium 8.7 8.5 - 10.1 mg/dL       Recent Labs   Lab Test  01/09/18   1122  02/03/17   1047   02/05/15   0837  04/29/13   1138   05/17/12   0904   HGB   --    --    --   16.0  15.7   --   16.7 "   PLT   --    --    --   197   --    --   191   NA  132*  137   < >  137  139   --   141   POTASSIUM  4.3  4.6   < >  4.0  4.1   --   4.7   CR  0.58*  0.62*   < >  0.64*  0.60*   --   0.81   A1C  11.1*  9.3*   < >  7.0*  6.4*   < >  5.7    < > = values in this interval not displayed.      EKG: sinus rhythm non specific T/ST wave change  IMPRESSION:   Reason for surgery/procedure: hernia   Diagnosis/reason for consult: ventral hernia/laparoscopic herniorrhaphy      The proposed surgical procedure is considered LOW risk.    REVISED CARDIAC RISK INDEX  The patient has the following serious cardiovascular risks for perioperative complications such as (MI, PE, VFib and 3  AV Block):  High risk surgery (>5% cardiac complication risk)  Diabetes Mellitus (on Insulin)  INTERPRETATION: 1 risks: Class II (low risk - 0.9% complication rate)    The patient has the following additional risks for perioperative complications:  The 10-year ASCVD risk score (Kaaawa JAIMEE Jr, et al., 2013) is: 35.3%    Values used to calculate the score:      Age: 63 years      Sex: Male      Is Non- : No      Diabetic: Yes      Tobacco smoker: Yes      Systolic Blood Pressure: 134 mmHg      Is BP treated: No      HDL Cholesterol: 30 mg/dL      Total Cholesterol: 184 mg/dL  High tolerance to opioid analgesics due to chronic pain medicine usage       ICD-10-CM    1. Preop general physical exam Z01.818 EKG 12-lead complete w/read - Clinics     CBC with platelets     Basic metabolic panel  (Ca, Cl, CO2, Creat, Gluc, K, Na, BUN)   2. DDD (degenerative disc disease), cervical M50.30 morphine (MS CONTIN) 30 MG 12 hr tablet     HYDROcodone-acetaminophen (NORCO) 5-325 MG per tablet   3. Primary localized osteoarthrosis of lower leg, unspecified laterality M17.10 morphine (MS CONTIN) 30 MG 12 hr tablet     HYDROcodone-acetaminophen (NORCO) 5-325 MG per tablet   4. Slow transit constipation K59.01 polyethylene glycol (MIRALAX) powder        RECOMMENDATIONS:     --Consult hospital rounder / IM to assist post-op medical management    Obstructive Sleep Apnea (or suspected sleep apnea)  Hospital staff are advised to monitor for sleep related oxygen desaturations due to suspicion of MIRNA      --Patient is to take all scheduled medications on the day of surgery EXCEPT for modifications listed below.    Diabetes Medication Use  -----Hold usual oral and non-insulin diabetic meds (e.g. Metformin, Actos, Glipizide) while NPO.   -----Take 80% of long acting insulin (e.g. Lantus, NPH) while NPO (fasting)  -----Hold short acting insulin (e.g. Novolog, Humalog) while NPO (fasting)  -----Use usual sliding scale correction of insulin while NPO (fasting)      Anticoagulant or Antiplatelet Medication Use  ASPIRIN: Discontinue ASA 7-10 days prior to procedure to reduce bleeding risk.  It should be resumed post-operatively.  NSAIDS: Ibuprofen (Motrin):         Stop one day prior to surgery        ACE Inhibitor or Angiotensin Receptor Blocker (ARB) Use  Ace inhibitor or Angiotensin Receptor Blocker (ARB) and will continue this medication due to the higher risk of uncontrolled perioerative hypertension (e.g. neurosurgical procedure)      APPROVAL GIVEN to proceed with proposed procedure, without further diagnostic evaluation       Signed Electronically by: Edison New MD    Copy of this evaluation report is provided to requesting physician.    [place] Preop Guidelines

## 2018-03-12 ENCOUNTER — ALLIED HEALTH/NURSE VISIT (OUTPATIENT)
Dept: EDUCATION SERVICES | Facility: CLINIC | Age: 63
End: 2018-03-12
Payer: COMMERCIAL

## 2018-03-12 DIAGNOSIS — Z79.4 TYPE 2 DIABETES MELLITUS WITH HYPERGLYCEMIA, WITH LONG-TERM CURRENT USE OF INSULIN (H): Primary | ICD-10-CM

## 2018-03-12 DIAGNOSIS — E11.65 TYPE 2 DIABETES MELLITUS WITH HYPERGLYCEMIA, WITH LONG-TERM CURRENT USE OF INSULIN (H): Primary | ICD-10-CM

## 2018-03-12 PROCEDURE — G0108 DIAB MANAGE TRN  PER INDIV: HCPCS

## 2018-03-12 NOTE — PATIENT INSTRUCTIONS
My Diabetes Care Goals:    Taking Medication: When resuming insulin following surgery and eating again, increase Lantus to 52 units per day.    Keep working on reducing sweets in the evening.     Work on exercising.     Follow up:  Call ([phone]), e-mail ([email or web address]), or send MyChart message with questions, concerns or if follow-up is needed.     Bring blood glucose meter and logbook with you to all doctor and follow-up appointments.     [place] [facility] for the [facility]:  For Your Diabetes Education and Nutrition Appointments Call:  [phone]   For Diabetes Education or Nutrition Related Questions:   Phone: [phone]  E-mail: [email or web address]  Fax: [phone]   If you need a medication refill please contact your pharmacy. Please allow 3 business days for your refills to be completed.    Instructions for emailing the Diabetes Educators    If you need to communicate a non-urgent message to a Diabetes Educator via email, please send to [email or web address].    Please follow the following email guidelines:    Subject line: Secure: your clinic name (example: Secure: [facility])  In the email please include: First name, middle initial, last name and date of birth.    We will be in touch with you within one (1) business day.

## 2018-03-12 NOTE — Clinical Note
Pt is taking 10-20 u novolog at a time for correction which is considerably more than his correction scale. Likely needs assistance with meal time coverage. Since he is trying to lose weight I would suggest a GLP-1 (looks like victoza is preferred with his insurance) and then maybe he won't need correction with novolog.  Other idea I have is a little meal time coverage but this won't help him lose weight so if we could try a GLP1 first that would be ideal. His TGs are 368 is the only thing but his BG was also >400 at the time they were checked. Please let me know your thoughts and I can update the patient! Thanks! Lizeth Charles, RD LD CDE

## 2018-03-12 NOTE — MR AVS SNAPSHOT
After Visit Summary   3/12/2018    Michael Colorado    MRN: [id number]           Patient Information     Date Of Birth          1955        Visit Information        Provider Department      3/12/2018 10:30 AM  DIABETIC ED RESOURCE [facility]        Today's Diagnoses     Type 2 diabetes mellitus with hyperglycemia, with long-term current use of insulin (H)    -  1      Care Instructions    My Diabetes Care Goals:    Taking Medication: When resuming insulin following surgery and eating again, increase Lantus to 52 units per day.    Keep working on reducing sweets in the evening.     Work on exercising.     Follow up:  Call [phone]), e-mail ([email or web address]), or send [employer] [place] message with questions, concerns or if follow-up is needed.     Bring blood glucose meter and logbook with you to all doctor and follow-up appointments.     [facility] for the [facility]:  For Your Diabetes Education and Nutrition Appointments Call:  [phone]   For Diabetes Education or Nutrition Related Questions:   Phone: [phone]  E-mail: [email or web address]  Fax: [phone]   If you need a medication refill please contact your pharmacy. Please allow 3 business days for your refills to be completed.    Instructions for emailing the Diabetes Educators    If you need to communicate a non-urgent message to a Diabetes Educator via email, please send to [email or web address].    Please follow the following email guidelines:    Subject line: Secure: your clinic name (example: Secure: [facility])  In the email please include: First name, middle initial, last name and date of birth.    We will be in touch with you within one (1) business day.             Follow-ups after your visit        Your next 10 appointments already scheduled     Mar 13, 2018   Procedure with Jairo Angel MD   [facility] (--)    [address]  [place] [address]   [phone]            Mar 13, 2018 10:00 AM CDT   [facility] Same Day Surgery with Jairo Angel MD, Lin Estevez PA-C   [facility]uling ([facility])    [facility] ([facility])   [phone]            May 07, 2018 10:30 AM CDT   Diabetic Education with  DIABETIC ED RESOURCE   [facility] ([facility])    [address] [place] [address]   [phone]              Who to contact     If you have questions or need follow up information about today's clinic visit or your schedule please contact [facility] directly at [phone].  Normal or non-critical lab and imaging results will be communicated to you by [employer] TV[employer], letter or phone within 4 business days after the clinic has received the results. If you do not hear from us within 7 days, please contact the clinic through [employer] or phone. If you have a critical or abnormal lab result, we will notify you by phone as soon as possible.  Submit refill requests through Modality or call your pharmacy and they will forward the refill request to us. Please allow 3 business days for your refill to be completed.          Additional Information About Your Visit        Modality Information     Modality lets you send messages to your doctor, view your test results, renew your prescriptions, schedule appointments and more. To sign up, go to [email or web address] . Click on \"Log in\" on the left side of the screen, which will take you to the Welcome page. Then click on \"Sign up Now\" on the right side of the page.     You will be asked to enter the access code listed below, as well as some personal information. Please follow the directions to create your username and password.     Your access code is: [email or web address]  Expires: 4/9/2018 12:16 PM   "   Your access code will  in 90 days. If you need help or a new code, please call your [facility] clinic or [phone].        Care EveryWhere ID     This is your Care EveryWhere ID. This could be used by other organizations to access your [facility] medical records  [id number]         Blood Pressure from Last 3 Encounters:   18 134/82   18 119/74   18 100/60    Weight from Last 3 Encounters:   18 (!) 168.8 kg (372 lb 3.2 oz)   18 (!) 170.1 kg (375 lb)   18 (!) 163.3 kg (360 lb)              Today, you had the following     No orders found for display       Primary Care Provider Office Phone # Fax #    Edison JULIO New [phone]        [facility] [address]        Equal Access to Services     CHEN PALUMBO : Hadii aad ku hadasho Sojerome, waaxda luqadaha, qaybta kaalmada adeegyada, waxay palma gutierrez . So [facility] [phone].    ATENCIÓN: Si habla español, tiene a howe disposición servicios gratuitos de asistencia lingüística. Llame al [phone].    We comply with applicable federal civil rights laws and [place] laws. We do not discriminate on the basis of race, color, national origin, age, disability, sex, sexual orientation, or gender identity.            Thank you!     Thank you for choosing [facility]  for your care. Our goal is always to provide you with excellent care. Hearing back from our patients is one way we can continue to improve our services. Please take a few minutes to complete the written survey that you may receive in the mail after your visit with us. Thank you!             Your Updated Medication List - Protect others around you: Learn how to safely use, store and throw away your medicines at www.disposemymeds.org.          This list is accurate as of 3/12/18 11:06 AM.  Always use your most recent med list.                   Brand Name Dispense Instructions for use  Diagnosis    aspirin 81 MG tablet      Take by mouth daily    Type II or unspecified type diabetes mellitus without mention of complication, uncontrolled       blood glucose monitoring lancets     100 each    Use to test blood sugar 3 times daily or as directed.  Ok to substitute alternative if insurance prefers.    Type 2 diabetes mellitus with hyperglycemia, with long-term current use of insulin (H)       * blood glucose monitoring test strip    no brand specified    100 strip    Use to test blood sugars 4 times daily or as directed please provide per formulary approval patient states Verio.    Poorly controlled type II diabetes mellitus with renal complication (H), Morbid obesity due to excess calories (H)       * blood glucose monitoring test strip    ONETOUCH VERIO IQ    100 each    Use to test blood sugars 3 times daily or as directed.    Type 2 diabetes mellitus with hyperglycemia, with long-term current use of insulin (H)       HYDROcodone-acetaminophen 5-325 MG per tablet    NORCO    60 tablet    Take 1-2 tablets by mouth every 12 hours maximum 2 tablet(s) per day    DDD (degenerative disc disease), cervical, Primary localized osteoarthrosis of lower leg, unspecified laterality       insulin aspart 100 UNIT/ML injection    NovoLOG FLEXPEN    15 mL    -199, then 2 units -249, then 4 units -299, then 6 units  -349, then 8 units  -399, then 10 units  -449, then 12 units  -499, then 14 units    Type 2 diabetes mellitus with hyperglycemia, with long-term current use of insulin (H)       insulin glargine 100 UNIT/ML injection    LANTUS SOLOSTAR    15 mL    Inject 49 Units Subcutaneous At Bedtime    Type 2 diabetes mellitus with hyperglycemia, with long-term current use of insulin (H)       insulin pen needle 32G X 4 MM     150 each    Use 4 pen needles daily or as directed.    Type 2 diabetes mellitus with hyperglycemia, with long-term current use of insulin (H)        metFORMIN 1000 MG tablet    GLUCOPHAGE    180 tablet    Take 1 tablet (1,000 mg) by mouth 2 times daily (with meals)    Poorly controlled type 2 diabetes mellitus (H)       morphine 30 MG 12 hr tablet    MS CONTIN    60 tablet    Take 1 tablet (30 mg) by mouth every 12 hours maximum 2 tablet(s) per day    DDD (degenerative disc disease), cervical, Primary localized osteoarthrosis of lower leg, unspecified laterality       polyethylene glycol powder    MIRALAX    510 g    Take 17 g (1 capful) by mouth daily    Slow transit constipation       TYLENOL PM EXTRA STRENGTH PO      Take by mouth At Bedtime        * Notice:  This list has 2 medication(s) that are the same as other medications prescribed for you. Read the directions carefully, and ask your doctor or other care provider to review them with you.

## 2018-03-12 NOTE — PROGRESS NOTES
Diabetes Self Management Training: Follow-up Visit    Michael Colorado presents today for evaluation of glucose control related to Type 2 diabetes.    He is accompanied by self    Patient's diabetes management related comments/concerns: none    Patient would like this visit to be focused around the following diabetes-related behaviors and goals: Taking Medication    ASSESSMENT:  Patient Problem List reviewed for relevant medical history and current medical status.    Current Diabetes Management per Patient:  Taking diabetes medications?   yes:     Diabetes Medication(s)     Biguanides Sig    metFORMIN (GLUCOPHAGE) 1000 MG tablet Take 1 tablet (1,000 mg) by mouth 2 times daily (with meals)    Insulin Sig    insulin glargine (LANTUS SOLOSTAR) 100 UNIT/ML injection Inject 49 Units Subcutaneous At Bedtime    insulin aspart (NOVOLOG FLEXPEN) 100 UNIT/ML injection -199, then 2 units  -249, then 4 units  -299, then 6 units   -349, then 8 units   -399, then 10 units   -449, then 12 units   -499, then 14 units      , Problems taking diabetes medications regularly? No   Usually takes 10-20 units novolog if high (does not follow correction scale).     *Abbreviated insulin dose documentation key: Insulin Trade Name ([id number]-lbouk-zhbxdk-bedtime) - i.e. Humalog 5-5-5-0 (Humalog 5 units at breakfast, 5 units at lunch, and 5 units at dinner).    Patient glucose self monitoring as follows: two times daily.   BG meter: One Touch Verio Flex meter  BG results: fasting 145, 148, 170, 175, 167, 184, 323, 206, 164, 220, 140, 155, 147, 178, 196, 190, 145, 136, 153, 180, 105  7 day ave 180  14 day ave 170  **reports elevated numbers are r/t when he has pie or donuts    BG values are: Not in goal  Patient's most recent   Lab Results   Component Value Date    A1C 11.1 01/09/2018    is not meeting goal of <7.0    Nutrition:  Patient skips breakfast regularly and trying to reduce his sweet intake like  "ice cream.  Roommate brings them in sometimes and he will have them then.     Breakfast - skips  Lunch - tries to have toast or sandwich    Dinner - turkey, dressing, gravy, mashed potato    Snacks - few times in the evening.     Beverages: water, coffee (cream and sugar 2 tsp per cup)    Cultural/Taoist diet restrictions: No     Biggest Challenge to Healthy Eating: sweets when his roommate buys them.    Physical Activity:    Just cleaning the house.     Diabetes Complications:  Acute Complications: At risk for hypoglycemia? yes  Frequency of hypoglycemia: none    Vitals:  There were no vitals taken for this visit.  Estimated body mass index is 41.4 kg/(m^2) as calculated from the following:    Height as of 3/6/18: 2.019 m (6' 7.5\").    Weight as of 3/6/18: 168.8 kg (372 lb 3.2 oz).     Last 3 BP:   BP Readings from Last 3 Encounters:   03/06/18 134/82   02/06/18 119/74   02/05/18 100/60       History   Smoking Status     Current Every Day Smoker     Packs/day: 1.00     Years: 30.00     Types: Cigarettes   Smokeless Tobacco     Never Used       Labs:  Lab Results   Component Value Date    A1C 11.1 01/09/2018     Lab Results   Component Value Date     03/06/2018     Lab Results   Component Value Date    LDL 80 01/09/2018     HDL Cholesterol   Date Value Ref Range Status   01/09/2018 30 (L) >39 mg/dL Final   ]  GFR Estimate   Date Value Ref Range Status   03/06/2018 >90 >60 mL/min/1.7m2 Final     Comment:     Non  GFR Calc     GFR Estimate If Black   Date Value Ref Range Status   03/06/2018 >90 >60 mL/min/1.7m2 Final     Comment:      GFR Calc     Lab Results   Component Value Date    CR 0.72 03/06/2018     No results found for: MICROALBUMIN    Health Beliefs and Attitudes:   Patient Activation Measure Survey Score:  SHAKEEL Score (Last Two) 8/19/2011   SHAKEEL Raw Score 42   Activation Score 66   SHAKEEL Level 3       Stage of Change: ACTION (Actively working towards change)    Diabetes " knowledge and skills assessment:     Patient is knowledgeable in diabetes management concepts related to: Monitoring and Taking Medication    Patient needs further education on the following diabetes management concepts: Healthy Eating, Being Active, Monitoring, Taking Medication, Problem Solving, Reducing Risks and Healthy Coping    Barriers to Learning Assessment: No Barriers identified    Based on learning assessment above, most appropriate setting for further diabetes education would be: Individual setting.    INTERVENTION:  As most FBS over target , will  increase Lantus. Pt reports he is taking 10-20 units at time of his novolog because his correction does not bring him down enough (averages 20 u/d total novolog). This is considerably more than his correction of 1:25 >150.  As he is requiring more correction he likely just needs some assistance with coverage of meals.  Considering he is trying to lose weight, could consider starting a GLP-1 such as Victoza to help with post meal excursions.  Then could have pt continue his correction if needed but would recommend he follow the correction scale listed in his med list. Of note, his TGs were 368 last time they were checked, however, his BG was > 400 at that time.     Education provided today on:  AADE Self-Care Behaviors:  Healthy Eating: consistency in amount, composition, and timing of food intake, weight reduction and portion control  Being Active: relationship to blood glucose  Monitoring: log and interpret results and individual blood glucose targets  Taking Medication: action of prescribed medication and when to take medications. Explained victoza briefly and showed him the demo pen.   Problem Solving: low blood glucose - causes, signs/symptoms, treatment and prevention    Opportunities for ongoing education and support in diabetes-self management were discussed.    Pt verbalized understanding of concepts discussed and recommendations provided today.        Education Materials Provided:  No new materials provided today    PLAN:  See Patient Instructions for co-developed, patient-stated behavior change goals.  Recommend increase to insulin - Increase to 52 u/d.  Consider starting Victoza. Will route to PCP.   Can continue correction with novolog but if victoza started would recommend to follow correction scale listed in med list.   AVS printed and provided to patient today.    FOLLOW-UP:  Follow-up appointment scheduled on 5/7/18.  Chart routed to referring provider.    Ongoing plan for education and support: Follow-up visit with diabetes educator in 2 months and Follow-up with primary care provider    LYNDSEY Garcia CDE    Time Spent: 30 minutes  Encounter Type: Individual    Any diabetes medication dose changes were made via the CDE Protocol and Collaborative Practice Agreement with the patient's referring provider. A copy of this encounter was shared with the provider.

## 2018-03-13 ENCOUNTER — HOSPITAL ENCOUNTER (OUTPATIENT)
Facility: CLINIC | Age: 63
Discharge: HOME | End: 2018-03-13
Attending: SURGERY | Admitting: SURGERY
Payer: COMMERCIAL

## 2018-03-13 ENCOUNTER — ANESTHESIA (OUTPATIENT)
Dept: SURGERY | Facility: CLINIC | Age: 63
End: 2018-03-13
Payer: COMMERCIAL

## 2018-03-13 ENCOUNTER — OFFICE VISIT (OUTPATIENT)
Dept: SURGERY | Facility: PHYSICIAN GROUP | Age: 63
End: 2018-03-13
Payer: COMMERCIAL

## 2018-03-13 ENCOUNTER — SURGERY (OUTPATIENT)
Age: 63
End: 2018-03-13

## 2018-03-13 ENCOUNTER — ANESTHESIA EVENT (OUTPATIENT)
Dept: SURGERY | Facility: CLINIC | Age: 63
End: 2018-03-13
Payer: COMMERCIAL

## 2018-03-13 ENCOUNTER — TELEPHONE (OUTPATIENT)
Dept: EDUCATION SERVICES | Facility: CLINIC | Age: 63
End: 2018-03-13

## 2018-03-13 VITALS
DIASTOLIC BLOOD PRESSURE: 90 MMHG | WEIGHT: 315 LBS | TEMPERATURE: 97.2 F | RESPIRATION RATE: 14 BRPM | BODY MASS INDEX: 36.45 KG/M2 | HEIGHT: 78 IN | SYSTOLIC BLOOD PRESSURE: 146 MMHG | OXYGEN SATURATION: 96 %

## 2018-03-13 DIAGNOSIS — G89.18 ACUTE POST-OPERATIVE PAIN: Primary | ICD-10-CM

## 2018-03-13 LAB
GLUCOSE BLDC GLUCOMTR-MCNC: 117 MG/DL (ref 70–99)
GLUCOSE BLDC GLUCOMTR-MCNC: 168 MG/DL (ref 70–99)

## 2018-03-13 PROCEDURE — 25000566 ZZH SEVOFLURANE, EA 15 MIN: Performed by: SURGERY

## 2018-03-13 PROCEDURE — 49653 ZZHC LAP VENT/ABD HERN PROC COMP: CPT | Performed by: SURGERY

## 2018-03-13 PROCEDURE — 40000170 ZZH STATISTIC PRE-PROCEDURE ASSESSMENT II: Performed by: SURGERY

## 2018-03-13 PROCEDURE — 37000009 ZZH ANESTHESIA TECHNICAL FEE, EACH ADDTL 15 MIN: Performed by: SURGERY

## 2018-03-13 PROCEDURE — 71000027 ZZH RECOVERY PHASE 2 EACH 15 MINS: Performed by: SURGERY

## 2018-03-13 PROCEDURE — C1781 MESH (IMPLANTABLE): HCPCS | Performed by: SURGERY

## 2018-03-13 PROCEDURE — 36000056 ZZH SURGERY LEVEL 3 1ST 30 MIN: Performed by: SURGERY

## 2018-03-13 PROCEDURE — 25000125 ZZHC RX 250: Performed by: NURSE ANESTHETIST, CERTIFIED REGISTERED

## 2018-03-13 PROCEDURE — 25000132 ZZH RX MED GY IP 250 OP 250 PS 637: Performed by: PHYSICIAN ASSISTANT

## 2018-03-13 PROCEDURE — 25000132 ZZH RX MED GY IP 250 OP 250 PS 637: Performed by: ANESTHESIOLOGY

## 2018-03-13 PROCEDURE — 37000008 ZZH ANESTHESIA TECHNICAL FEE, 1ST 30 MIN: Performed by: SURGERY

## 2018-03-13 PROCEDURE — 25000128 H RX IP 250 OP 636: Performed by: NURSE ANESTHETIST, CERTIFIED REGISTERED

## 2018-03-13 PROCEDURE — 49653 ZZHC LAP VENT/ABD HERN PROC COMP: CPT | Mod: AS | Performed by: PHYSICIAN ASSISTANT

## 2018-03-13 PROCEDURE — 71000012 ZZH RECOVERY PHASE 1 LEVEL 1 FIRST HR: Performed by: SURGERY

## 2018-03-13 PROCEDURE — 25000125 ZZHC RX 250: Performed by: SURGERY

## 2018-03-13 PROCEDURE — 36000058 ZZH SURGERY LEVEL 3 EA 15 ADDTL MIN: Performed by: SURGERY

## 2018-03-13 PROCEDURE — 25000128 H RX IP 250 OP 636: Performed by: ANESTHESIOLOGY

## 2018-03-13 PROCEDURE — 27210794 ZZH OR GENERAL SUPPLY STERILE: Performed by: SURGERY

## 2018-03-13 PROCEDURE — 27210995 ZZH RX 272: Performed by: SURGERY

## 2018-03-13 PROCEDURE — 25000128 H RX IP 250 OP 636: Performed by: SURGERY

## 2018-03-13 PROCEDURE — 82962 GLUCOSE BLOOD TEST: CPT

## 2018-03-13 DEVICE — MESH SYMBOTEX COMPOSITE STEX 15CM X 10CM SYM1510: Type: IMPLANTABLE DEVICE | Site: ABDOMEN | Status: FUNCTIONAL

## 2018-03-13 RX ORDER — CYCLOBENZAPRINE HCL 5 MG
5-10 TABLET ORAL 3 TIMES DAILY PRN
Qty: 30 TABLET | Refills: 0 | Status: SHIPPED | OUTPATIENT
Start: 2018-03-13 | End: 2018-03-19

## 2018-03-13 RX ORDER — ACETAMINOPHEN 500 MG
1000 TABLET ORAL ONCE
Status: COMPLETED | OUTPATIENT
Start: 2018-03-13 | End: 2018-03-13

## 2018-03-13 RX ORDER — GLYCOPYRROLATE 0.2 MG/ML
INJECTION, SOLUTION INTRAMUSCULAR; INTRAVENOUS PRN
Status: DISCONTINUED | OUTPATIENT
Start: 2018-03-13 | End: 2018-03-13

## 2018-03-13 RX ORDER — SODIUM CHLORIDE, SODIUM LACTATE, POTASSIUM CHLORIDE, CALCIUM CHLORIDE 600; 310; 30; 20 MG/100ML; MG/100ML; MG/100ML; MG/100ML
INJECTION, SOLUTION INTRAVENOUS CONTINUOUS PRN
Status: DISCONTINUED | OUTPATIENT
Start: 2018-03-13 | End: 2018-03-13

## 2018-03-13 RX ORDER — FENTANYL CITRATE 50 UG/ML
25-50 INJECTION, SOLUTION INTRAMUSCULAR; INTRAVENOUS
Status: DISCONTINUED | OUTPATIENT
Start: 2018-03-13 | End: 2018-03-13 | Stop reason: HOSPADM

## 2018-03-13 RX ORDER — CEFAZOLIN SODIUM 1 G
1 VIAL (EA) INJECTION SEE ADMIN INSTRUCTIONS
Status: DISCONTINUED | OUTPATIENT
Start: 2018-03-13 | End: 2018-03-13 | Stop reason: HOSPADM

## 2018-03-13 RX ORDER — NEOSTIGMINE METHYLSULFATE 1 MG/ML
VIAL (ML) INJECTION PRN
Status: DISCONTINUED | OUTPATIENT
Start: 2018-03-13 | End: 2018-03-13

## 2018-03-13 RX ORDER — ONDANSETRON 4 MG/1
4 TABLET, ORALLY DISINTEGRATING ORAL EVERY 30 MIN PRN
Status: DISCONTINUED | OUTPATIENT
Start: 2018-03-13 | End: 2018-03-13 | Stop reason: HOSPADM

## 2018-03-13 RX ORDER — PHYSOSTIGMINE SALICYLATE 1 MG/ML
1.2 INJECTION INTRAVENOUS
Status: DISCONTINUED | OUTPATIENT
Start: 2018-03-13 | End: 2018-03-13 | Stop reason: HOSPADM

## 2018-03-13 RX ORDER — FENTANYL CITRATE 50 UG/ML
INJECTION, SOLUTION INTRAMUSCULAR; INTRAVENOUS PRN
Status: DISCONTINUED | OUTPATIENT
Start: 2018-03-13 | End: 2018-03-13

## 2018-03-13 RX ORDER — HYDROXYZINE HYDROCHLORIDE 50 MG/ML
50 INJECTION, SOLUTION INTRAMUSCULAR
Status: DISCONTINUED | OUTPATIENT
Start: 2018-03-13 | End: 2018-03-13 | Stop reason: HOSPADM

## 2018-03-13 RX ORDER — OXYCODONE AND ACETAMINOPHEN 5; 325 MG/1; MG/1
1-2 TABLET ORAL EVERY 4 HOURS PRN
Qty: 30 TABLET | Refills: 0 | Status: SHIPPED | OUTPATIENT
Start: 2018-03-13 | End: 2018-03-19

## 2018-03-13 RX ORDER — NALOXONE HYDROCHLORIDE 0.4 MG/ML
.1-.4 INJECTION, SOLUTION INTRAMUSCULAR; INTRAVENOUS; SUBCUTANEOUS
Status: DISCONTINUED | OUTPATIENT
Start: 2018-03-13 | End: 2018-03-13 | Stop reason: HOSPADM

## 2018-03-13 RX ORDER — LIDOCAINE HYDROCHLORIDE 20 MG/ML
INJECTION, SOLUTION INFILTRATION; PERINEURAL PRN
Status: DISCONTINUED | OUTPATIENT
Start: 2018-03-13 | End: 2018-03-13

## 2018-03-13 RX ORDER — BUPIVACAINE HYDROCHLORIDE 2.5 MG/ML
INJECTION, SOLUTION EPIDURAL; INFILTRATION; INTRACAUDAL
Status: DISCONTINUED
Start: 2018-03-13 | End: 2018-03-13 | Stop reason: HOSPADM

## 2018-03-13 RX ORDER — OXYCODONE AND ACETAMINOPHEN 5; 325 MG/1; MG/1
1 TABLET ORAL
Status: COMPLETED | OUTPATIENT
Start: 2018-03-13 | End: 2018-03-13

## 2018-03-13 RX ORDER — KETAMINE HYDROCHLORIDE 10 MG/ML
INJECTION INTRAMUSCULAR; INTRAVENOUS PRN
Status: DISCONTINUED | OUTPATIENT
Start: 2018-03-13 | End: 2018-03-13

## 2018-03-13 RX ORDER — FENTANYL CITRATE 50 UG/ML
25-50 INJECTION, SOLUTION INTRAMUSCULAR; INTRAVENOUS EVERY 5 MIN PRN
Status: DISCONTINUED | OUTPATIENT
Start: 2018-03-13 | End: 2018-03-13 | Stop reason: HOSPADM

## 2018-03-13 RX ORDER — LIRAGLUTIDE 6 MG/ML
0.6 INJECTION SUBCUTANEOUS DAILY
Qty: 9 ML | Refills: 1 | Status: CANCELLED | OUTPATIENT
Start: 2018-03-13

## 2018-03-13 RX ORDER — KETOROLAC TROMETHAMINE 30 MG/ML
30 INJECTION, SOLUTION INTRAMUSCULAR; INTRAVENOUS ONCE
Status: COMPLETED | OUTPATIENT
Start: 2018-03-13 | End: 2018-03-13

## 2018-03-13 RX ORDER — SODIUM CHLORIDE, SODIUM LACTATE, POTASSIUM CHLORIDE, CALCIUM CHLORIDE 600; 310; 30; 20 MG/100ML; MG/100ML; MG/100ML; MG/100ML
INJECTION, SOLUTION INTRAVENOUS CONTINUOUS
Status: DISCONTINUED | OUTPATIENT
Start: 2018-03-13 | End: 2018-03-13 | Stop reason: HOSPADM

## 2018-03-13 RX ORDER — ONDANSETRON 2 MG/ML
INJECTION INTRAMUSCULAR; INTRAVENOUS PRN
Status: DISCONTINUED | OUTPATIENT
Start: 2018-03-13 | End: 2018-03-13

## 2018-03-13 RX ORDER — AMOXICILLIN 250 MG
1-2 CAPSULE ORAL 2 TIMES DAILY
Qty: 20 TABLET | Refills: 0 | Status: SHIPPED | OUTPATIENT
Start: 2018-03-13 | End: 2018-04-06

## 2018-03-13 RX ORDER — PROPOFOL 10 MG/ML
INJECTION, EMULSION INTRAVENOUS PRN
Status: DISCONTINUED | OUTPATIENT
Start: 2018-03-13 | End: 2018-03-13

## 2018-03-13 RX ORDER — ACETAMINOPHEN 10 MG/ML
INJECTION, SOLUTION INTRAVENOUS
Status: DISCONTINUED
Start: 2018-03-13 | End: 2018-03-13 | Stop reason: WASHOUT

## 2018-03-13 RX ORDER — HYDROMORPHONE HYDROCHLORIDE 1 MG/ML
.3-.5 INJECTION, SOLUTION INTRAMUSCULAR; INTRAVENOUS; SUBCUTANEOUS EVERY 10 MIN PRN
Status: DISCONTINUED | OUTPATIENT
Start: 2018-03-13 | End: 2018-03-13 | Stop reason: HOSPADM

## 2018-03-13 RX ORDER — ONDANSETRON 2 MG/ML
4 INJECTION INTRAMUSCULAR; INTRAVENOUS EVERY 30 MIN PRN
Status: DISCONTINUED | OUTPATIENT
Start: 2018-03-13 | End: 2018-03-13 | Stop reason: HOSPADM

## 2018-03-13 RX ORDER — MAGNESIUM HYDROXIDE 1200 MG/15ML
LIQUID ORAL PRN
Status: DISCONTINUED | OUTPATIENT
Start: 2018-03-13 | End: 2018-03-13 | Stop reason: HOSPADM

## 2018-03-13 RX ORDER — HYDROXYZINE HYDROCHLORIDE 50 MG/1
50 TABLET, FILM COATED ORAL ONCE
Status: COMPLETED | OUTPATIENT
Start: 2018-03-13 | End: 2018-03-13

## 2018-03-13 RX ORDER — CEFAZOLIN SODIUM 1 G/50ML
3 SOLUTION INTRAVENOUS
Status: COMPLETED | OUTPATIENT
Start: 2018-03-13 | End: 2018-03-13

## 2018-03-13 RX ORDER — EPINEPHRINE 1 MG/ML
INJECTION, SOLUTION INTRAMUSCULAR; SUBCUTANEOUS
Status: DISCONTINUED
Start: 2018-03-13 | End: 2018-03-13 | Stop reason: HOSPADM

## 2018-03-13 RX ORDER — MEPERIDINE HYDROCHLORIDE 25 MG/ML
12.5 INJECTION INTRAMUSCULAR; INTRAVENOUS; SUBCUTANEOUS
Status: DISCONTINUED | OUTPATIENT
Start: 2018-03-13 | End: 2018-03-13 | Stop reason: HOSPADM

## 2018-03-13 RX ADMIN — HYDROMORPHONE HYDROCHLORIDE 0.5 MG: 1 INJECTION, SOLUTION INTRAMUSCULAR; INTRAVENOUS; SUBCUTANEOUS at 11:45

## 2018-03-13 RX ADMIN — OXYCODONE HYDROCHLORIDE AND ACETAMINOPHEN 1 TABLET: 5; 325 TABLET ORAL at 12:14

## 2018-03-13 RX ADMIN — FENTANYL CITRATE 50 MCG: 50 INJECTION, SOLUTION INTRAMUSCULAR; INTRAVENOUS at 11:33

## 2018-03-13 RX ADMIN — SODIUM CHLORIDE, POTASSIUM CHLORIDE, SODIUM LACTATE AND CALCIUM CHLORIDE: 600; 310; 30; 20 INJECTION, SOLUTION INTRAVENOUS at 10:08

## 2018-03-13 RX ADMIN — PROPOFOL 100 MG: 10 INJECTION, EMULSION INTRAVENOUS at 10:17

## 2018-03-13 RX ADMIN — FENTANYL CITRATE 50 MCG: 50 INJECTION, SOLUTION INTRAMUSCULAR; INTRAVENOUS at 10:44

## 2018-03-13 RX ADMIN — SODIUM CHLORIDE, POTASSIUM CHLORIDE, SODIUM LACTATE AND CALCIUM CHLORIDE: 600; 310; 30; 20 INJECTION, SOLUTION INTRAVENOUS at 10:51

## 2018-03-13 RX ADMIN — MIDAZOLAM 2 MG: 1 INJECTION INTRAMUSCULAR; INTRAVENOUS at 10:11

## 2018-03-13 RX ADMIN — KETAMINE HYDROCHLORIDE 20 MG: 10 INJECTION, SOLUTION INTRAMUSCULAR; INTRAVENOUS at 10:21

## 2018-03-13 RX ADMIN — LIDOCAINE HYDROCHLORIDE 43 ML: 10 INJECTION, SOLUTION INFILTRATION; PERINEURAL at 11:21

## 2018-03-13 RX ADMIN — HYDROMORPHONE HYDROCHLORIDE 0.5 MG: 1 INJECTION, SOLUTION INTRAMUSCULAR; INTRAVENOUS; SUBCUTANEOUS at 11:58

## 2018-03-13 RX ADMIN — KETOROLAC TROMETHAMINE 30 MG: 30 INJECTION, SOLUTION INTRAMUSCULAR at 12:14

## 2018-03-13 RX ADMIN — ROCURONIUM BROMIDE 60 MG: 10 INJECTION INTRAVENOUS at 10:12

## 2018-03-13 RX ADMIN — SODIUM CHLORIDE 1000 ML: 900 IRRIGANT IRRIGATION at 10:31

## 2018-03-13 RX ADMIN — NEOSTIGMINE METHYLSULFATE 5 MG: 1 INJECTION INTRAMUSCULAR; INTRAVENOUS; SUBCUTANEOUS at 11:19

## 2018-03-13 RX ADMIN — ONDANSETRON 4 MG: 2 INJECTION INTRAMUSCULAR; INTRAVENOUS at 10:30

## 2018-03-13 RX ADMIN — PROPOFOL 300 MG: 10 INJECTION, EMULSION INTRAVENOUS at 10:12

## 2018-03-13 RX ADMIN — GLYCOPYRROLATE 0.8 MG: 0.2 INJECTION, SOLUTION INTRAMUSCULAR; INTRAVENOUS at 11:19

## 2018-03-13 RX ADMIN — Medication 3 G: at 10:17

## 2018-03-13 RX ADMIN — LIDOCAINE HYDROCHLORIDE 100 MG: 20 INJECTION, SOLUTION INFILTRATION; PERINEURAL at 10:12

## 2018-03-13 RX ADMIN — FENTANYL CITRATE 100 MCG: 50 INJECTION, SOLUTION INTRAMUSCULAR; INTRAVENOUS at 10:11

## 2018-03-13 RX ADMIN — ACETAMINOPHEN 1000 MG: 500 TABLET, FILM COATED ORAL at 08:58

## 2018-03-13 RX ADMIN — HYDROXYZINE HYDROCHLORIDE 50 MG: 50 TABLET ORAL at 08:58

## 2018-03-13 ASSESSMENT — LIFESTYLE VARIABLES: TOBACCO_USE: 1

## 2018-03-13 NOTE — OP NOTE
Preoperative diagnosis: Incarcerated ventral hernia.   Postoperative diagnosis: Incarcerated ventral hernia.   Operative procedure:   1. Laparoscopic repair of incarcerated ventral hernia   2. Placement of 10 x 15 cm mesh.      Surgeon: Jairo Angel M.D.   Assistant:Heidy Jewell PA-C, The physicians assistant was medically necessary for their expertise in camera management, suctioning, suturing, and retraction.    Anesthesia: GETA   EBL: Less than 5 mL.   Events:   After induction of general endotracheal anesthesia Michael Colorado abdomen was prepped and draped in the usual sterile fashion. With the direct visualization trocar in the left upper quadrant the abdomen was entered and pneumoperitoneum was established. An additional trocar was placed along the left flank, a 12 mm size. We then encounter hernia along the midiline.  The small bowel in the hernia was reduced and looked irritaded but healthy otherwise.  We then spent about 10 minutes liberalizing all the incarcerated fatty tissue. The dissected tissue was inspected and  no evidence of injuries noted.  We then utilizing multiple 0 Nurolon sutures to reapproximate primarily the hernia. Then placed a 10 x 15 cm symbotex mesh to completely cover the area of concern. The mesh was secured with transabdominal 0 Nurolon suture. We then utilized secure straps around the periphery of the mesh and throughout its body. The abdomen was surveyed and no injuries noted. The 12 mm trocar site was closed with 0 Vicryl suture using Carlin Madalyn device.  Trocars were removed under direct visualization without evidence of bleeding. Pneumoperitoneum was released and skin approximated with absorbable suture. Steri-Strips and dressing applied. No immediate complications. Counts reported correct.

## 2018-03-13 NOTE — ANESTHESIA POSTPROCEDURE EVALUATION
Patient: Michael Colorado    Procedure(s):  LAPAROSCOPIC VENTRAL HERNIA REPAIR WITH MESH   - Wound Class: I-Clean    Diagnosis:ventral hernia  Diagnosis Additional Information: No value filed.    Anesthesia Type:  General, ETT    Note:  Anesthesia Post Evaluation    Patient location during evaluation: PACU  Patient participation: Able to fully participate in evaluation  Level of consciousness: awake  Pain management: adequate  Airway patency: patent  Cardiovascular status: acceptable  Respiratory status: acceptable  Hydration status: acceptable  PONV: none     Anesthetic complications: None          Last vitals:  Vitals:    03/13/18 0846 03/13/18 1134 03/13/18 1145   BP: 145/83 (!) 157/93    Resp: 20 24    Temp: 36.4  C (97.5  F) 36.2  C (97.2  F)    SpO2: 98% 97% 94%         Electronically Signed By: Ciaran Santillan MD  March 13, 2018  11:58 AM

## 2018-03-13 NOTE — ANESTHESIA CARE TRANSFER NOTE
Patient: Michael Colorado    Procedure(s):  LAPAROSCOPIC VENTRAL HERNIA REPAIR WITH MESH   - Wound Class: I-Clean    Diagnosis: ventral hernia  Diagnosis Additional Information: No value filed.    Anesthesia Type:   General, ETT     Note:  Airway :Face Mask  Patient transferred to:PACU  Comments: Extubated awake in OR, exchanging well, to recovery, VSSHandoff Report: Identifed the Patient, Identified the Reponsible Provider, Reviewed the pertinent medical history, Discussed the surgical course, Reviewed Intra-OP anesthesia mangement and issues during anesthesia, Set expectations for post-procedure period and Allowed opportunity for questions and acknowledgement of understanding      Vitals: (Last set prior to Anesthesia Care Transfer)    CRNA VITALS  3/13/2018 1102 - 3/13/2018 1137      3/13/2018             Pulse: 74    SpO2: 94 %    Resp Rate (observed): (!)  1                Electronically Signed By: MANI Miles CRNA  March 13, 2018  11:37 AM

## 2018-03-13 NOTE — DISCHARGE INSTRUCTIONS
Same Day Surgery Discharge Instructions for  Sedation and General Anesthesia       It's not unusual to feel dizzy, light-headed or faint for up to 24 hours after surgery or while taking pain medication.  If you have these symptoms: sit for a few minutes before standing and have someone assist you when you get up to walk or use the bathroom.      You should rest and relax for the next 24 hours. We recommend you make arrangements to have an adult stay with you for at least 24 hours after your discharge.  Avoid hazardous and strenuous activity.      DO NOT DRIVE any vehicle or operate mechanical equipment for 24 hours following the end of your surgery.  Even though you may feel normal, your reactions may be affected by the medication you have received.      Do not drink alcoholic beverages for 24 hours following surgery.       Slowly progress to your regular diet as you feel able. It's not unusual to feel nauseated and/or vomit after receiving anesthesia.  If you develop these symptoms, drink clear liquids (apple juice, ginger ale, broth, 7-up, etc. ) until you feel better.  If your nausea and vomiting persists for 24 hours, please notify your surgeon.        All narcotic pain medications, along with inactivity and anesthesia, can cause constipation. Drinking plenty of liquids and increasing fiber intake will help.      For any questions of a medical nature, call your surgeon.      Do not make important decisions for 24 hours.      If you had general anesthesia, you may have a sore throat for a couple of days related to the breathing tube used during surgery.  You may use Cepacol lozenges to help with this discomfort.  If it worsens or if you develop a fever, contact your surgeon.       If you feel your pain is not well managed with the pain medications prescribed by your surgeon, please contact your surgeon's office to let them know so they can address your concerns.       Today you received Toradol, an  antiinflammatory medication similar to Ibuprofen.  You should not take other antiinflammatory medication, such as Ibuprofen, Motrin, Advil, Aleve, Naprosyn, etc, until 6:15 pm      Today you were given 1000 mg of Tylenol at 0900. The recommended daily maximum dose is 4000 mg.         [facility]  Discharge Instructions: Post-Operative Laparoscopic Ventral Hernia    ACTIVITY    Take frequent, short walks and increase your activity gradually.      Avoid strenuous physical activity or heavy lifting greater than 15 lbs. for 4 weeks.  You may climb stairs.    You may drive without restrictions when you are not using any prescription pain medication and feel comfortable in a car.    You may return to work/school when you are comfortable without any prescription pain medication.     You may wear an abdominal binder for comfort for 2-3 weeks from your surgery.  You can wash the abdominal binder and dry it on low heat in the dryer.    WOUND CARE    You may remove your outer dressing or Band-Aids and shower 48 hours after the surgery.  Pat your incisions dry and leave them open to air.  Re-apply dressing (Band-Aids or gauze/tape) as needed for comfort or drainage.    You may have steri-strips (looks like white tape) on your incision.  You may peel off the steri-strips 2 weeks after your surgery if they have not peeled off on their own.     Do not soak your incisions in a tub or pool for 2 weeks.     Do not apply any lotions, creams, or ointments to your incisions.    A ridge under your incisions is normal and will gradually resolve.    DIET    Start with liquids, then gradually resume your regular diet as tolerated.     Drink plenty of fluids to stay hydrated.    PAIN    Expect some tenderness and discomfort at the incision site(s).  Use the prescribed pain medication/muscle relaxant at your discretion.  Expect gradual resolution of your pain over several days.    You may take ibuprofen  with food (unless you have been told not to) instead of or in addition to your prescribed pain medication.  If you are taking Norco or Percocet, do not take any additional acetaminophen/APAP/Tylenol.    Do not drink alcohol or drive while you are taking pain medications.    You may apply ice to your incisions in 20 minute intervals as needed for the next 48 hours.  After that time, consider switching to heat if you prefer.    EXPECTATIONS    Pain medications can cause constipation.  Limit use when possible.  Take over the counter stool softener/stimulant, such as Colace or Senna, 1-2 times a day with plenty of water.  You may take a mild over the counter laxative, such as Miralax or a suppository, as needed.  You make discontinue these medications once you are having regular bowel movements and/or are no longer taking your narcotic pain medication.    You may have shoulder or upper back discomfort due to the gas used in surgery.  This is temporary and should resolve in 48-72 hours.  Short, frequent walks may help with this.    FOLLOW UP  Our office will contact you approximately 2 weeks to check on your progress and answer any questions you may have.  If you are doing well, you will not need to return for a follow up appointment.  If any concerns are identified over the phone, we will help you make an appointment to see a provider.  If you have not received a phone call, have any questions or concerns, or would like to be seen, please call us at [phone] and ask to speak with our nurse.  We are located at [address] [place] [address].    CALL OUR OFFICE -[phone] IF YOU HAVE:     Chills or fever above 101 F.    Increased redness or drainage at your incisions.    Significant bleeding.    Pain not relieved by your pain medication or rest.    Increasing pain after the first 48 hours.    Any other concerns or questions.    Revised January 2018

## 2018-03-13 NOTE — IP AVS SNAPSHOT
MRN:[id number]                      After Visit Summary   3/13/2018    Michael Colorado    MRN: [id number]           Thank you!     Thank you for choosing [facility] for your care. Our goal is always to provide you with excellent care. Hearing back from our patients is one way we can continue to improve our services. Please take a few minutes to complete the written survey that you may receive in the mail after you visit with us. Thank you!        Patient Information     Date Of Birth          1955        About your hospital stay     You were admitted on:  March 13, 2018 [facility]    You were discharged on:  March 13, 2018       Who to Call     For medical emergencies, please call 911.  For non-urgent questions about your medical care, please call your primary care provider or clinic, [phone]  For questions related to your surgery, please call your surgery clinic        Attending Provider     Provider Specialty    Jairo Angel MD Surgery       Primary Care Provider Office Phone # Fax #    [place] [facility] [phone]      After Care Instructions     Discharge Instructions       Resume your aspirin tomorrow.                  Your next 10 appointments already scheduled     May 07, 2018 10:30 AM CDT   Diabetic Education with  [facility]   [facility] ([address] [address]   [phone]              Further instructions from your care team               Same Day Surgery Discharge Instructions for  Sedation and General Anesthesia       It's not unusual to feel dizzy, light-headed or faint for up to 24 hours after surgery or while taking pain medication.  If you have these symptoms: sit for a few minutes before standing and have someone assist you when you get up to walk or use the bathroom.      You should rest and relax for  the next 24 hours. We recommend you make arrangements to have an adult stay with you for at least 24 hours after your discharge.  Avoid hazardous and strenuous activity.      DO NOT DRIVE any vehicle or operate mechanical equipment for 24 hours following the end of your surgery.  Even though you may feel normal, your reactions may be affected by the medication you have received.      Do not drink alcoholic beverages for 24 hours following surgery.       Slowly progress to your regular diet as you feel able. It's not unusual to feel nauseated and/or vomit after receiving anesthesia.  If you develop these symptoms, drink clear liquids (apple juice, ginger ale, broth, 7-up, etc. ) until you feel better.  If your nausea and vomiting persists for 24 hours, please notify your surgeon.        All narcotic pain medications, along with inactivity and anesthesia, can cause constipation. Drinking plenty of liquids and increasing fiber intake will help.      For any questions of a medical nature, call your surgeon.      Do not make important decisions for 24 hours.      If you had general anesthesia, you may have a sore throat for a couple of days related to the breathing tube used during surgery.  You may use Cepacol lozenges to help with this discomfort.  If it worsens or if you develop a fever, contact your surgeon.       If you feel your pain is not well managed with the pain medications prescribed by your surgeon, please contact your surgeon's office to let them know so they can address your concerns.       Today you received Toradol, an antiinflammatory medication similar to Ibuprofen.  You should not take other antiinflammatory medication, such as Ibuprofen, Motrin, Advil, Aleve, Naprosyn, etc, until 6:15 pm      Today you were given 1000 mg of Tylenol at 0900. The recommended daily maximum dose is 4000 mg.         [facility]  Discharge Instructions: Post-Operative Laparoscopic  Ventral Hernia    ACTIVITY    Take frequent, short walks and increase your activity gradually.      Avoid strenuous physical activity or heavy lifting greater than 15 lbs. for 4 weeks.  You may climb stairs.    You may drive without restrictions when you are not using any prescription pain medication and feel comfortable in a car.    You may return to work/school when you are comfortable without any prescription pain medication.     You may wear an abdominal binder for comfort for 2-3 weeks from your surgery.  You can wash the abdominal binder and dry it on low heat in the dryer.    WOUND CARE    You may remove your outer dressing or Band-Aids and shower 48 hours after the surgery.  Pat your incisions dry and leave them open to air.  Re-apply dressing (Band-Aids or gauze/tape) as needed for comfort or drainage.    You may have steri-strips (looks like white tape) on your incision.  You may peel off the steri-strips 2 weeks after your surgery if they have not peeled off on their own.     Do not soak your incisions in a tub or pool for 2 weeks.     Do not apply any lotions, creams, or ointments to your incisions.    A ridge under your incisions is normal and will gradually resolve.    DIET    Start with liquids, then gradually resume your regular diet as tolerated.     Drink plenty of fluids to stay hydrated.    PAIN    Expect some tenderness and discomfort at the incision site(s).  Use the prescribed pain medication/muscle relaxant at your discretion.  Expect gradual resolution of your pain over several days.    You may take ibuprofen with food (unless you have been told not to) instead of or in addition to your prescribed pain medication.  If you are taking Norco or Percocet, do not take any additional acetaminophen/APAP/Tylenol.    Do not drink alcohol or drive while you are taking pain medications.    You may apply ice to your incisions in 20 minute intervals as needed for the next 48 hours.  After that time,  "consider switching to heat if you prefer.    EXPECTATIONS    Pain medications can cause constipation.  Limit use when possible.  Take over the counter stool softener/stimulant, such as Colace or Senna, 1-2 times a day with plenty of water.  You may take a mild over the counter laxative, such as Miralax or a suppository, as needed.  You make discontinue these medications once you are having regular bowel movements and/or are no longer taking your narcotic pain medication.    You may have shoulder or upper back discomfort due to the gas used in surgery.  This is temporary and should resolve in 48-72 hours.  Short, frequent walks may help with this.    FOLLOW UP  Our office will contact you approximately 2 weeks to check on your progress and answer any questions you may have.  If you are doing well, you will not need to return for a follow up appointment.  If any concerns are identified over the phone, we will help you make an appointment to see a provider.  If you have not received a phone call, have any questions or concerns, or would like to be seen, please call us at [phone] and ask to speak with our nurse.  We are located at [address] [place] [address].    CALL OUR OFFICE -[phone] IF YOU HAVE:     Chills or fever above 101 F.    Increased redness or drainage at your incisions.    Significant bleeding.    Pain not relieved by your pain medication or rest.    Increasing pain after the first 48 hours.    Any other concerns or questions.    Revised January 2018      Pending Results     No orders found from 3/11/2018 to 3/14/2018.            Admission Information     Date & Time Provider Department Dept. Phone    3/13/2018 Jairo Angel MD [facility] Same Day Surgery [phone]      Your Vitals Were     Blood Pressure Temperature Respirations Height Weight Pulse Oximetry    100/63 97.2  F (36.2  C) (Temporal) 15 2.019 m (6' 7.5\") 169.7 kg (374 lb 2 oz) 94%    BMI (Body " "Mass Index)                   41.62 kg/m2           [employer] Information     [employer] lets you send messages to your doctor, view your test results, renew your prescriptions, schedule appointments and more. To sign up, go to [email or web address] . Click on \"Log in\" on the left side of the screen, which will take you to the Welcome page. Then click on \"Sign up Now\" on the right side of the page.     You will be asked to enter the access code listed below, as well as some personal information. Please follow the directions to create your username and password.     Your access code is: [email or web address]  Expires: 2018 12:16 PM     Your access code will  in 90 days. If you need help or a new code, please call your [place] clinic or [phone].        Care EveryWhere ID     This is your Care EveryWhere ID. This could be used by other organizations to access your [place] medical records  [id number]        Equal Access to Services     [facility] : Hadii nidia rodriguez hadasho Sorachanaali, waaxda luqadaha, qaybta kaalmada adeegyada, wing gutierrez . So [facility] [phone].    ATENCIÓN: Si habla español, tiene a howe disposición servicios gratuitos de asistencia lingüística. Llalivia al [phone].    We comply with applicable federal civil rights laws and [place] laws. We do not discriminate on the basis of race, color, national origin, age, disability, sex, sexual orientation, or gender identity.               Review of your medicines      START taking        Dose / Directions    cyclobenzaprine 5 MG tablet   Commonly known as:  FLEXERIL   Used for:  Acute post-operative pain        Dose:  5-10 mg   Take 1-2 tablets (5-10 mg) by mouth 3 times daily as needed for muscle spasms   Quantity:  30 tablet   Refills:  0       oxyCODONE-acetaminophen 5-325 MG per tablet   Commonly known as:  PERCOCET   Used for:  Acute post-operative pain   Notes to Patient:  One percocet pain pill given at 1215 pm        " Dose:  1-2 tablet   Take 1-2 tablets by mouth every 4 hours as needed for pain (moderate to severe)   Quantity:  30 tablet   Refills:  0       senna-docusate 8.6-50 MG per tablet   Commonly known as:  SENOKOT-S;PERICOLACE   Used for:  Acute post-operative pain        Dose:  1-2 tablet   Take 1-2 tablets by mouth 2 times daily Take while on oral narcotics to prevent or treat constipation.   Quantity:  20 tablet   Refills:  0         CONTINUE these medicines which may have CHANGED, or have new prescriptions. If we are uncertain of the size of tablets/capsules you have at home, strength may be listed as something that might have changed.        Dose / Directions    polyethylene glycol powder   Commonly known as:  MIRALAX   This may have changed:    - when to take this  - reasons to take this   Used for:  Slow transit constipation        Dose:  1 capful   Take 17 g (1 capful) by mouth daily   Quantity:  510 g   Refills:  1         CONTINUE these medicines which have NOT CHANGED        Dose / Directions    aspirin 81 MG tablet   Used for:  Type II or unspecified type diabetes mellitus without mention of complication, uncontrolled        Take by mouth daily   Refills:  0       blood glucose monitoring lancets   Used for:  Type 2 diabetes mellitus with hyperglycemia, with long-term current use of insulin (H)        Use to test blood sugar 3 times daily or as directed.  Ok to substitute alternative if insurance prefers.   Quantity:  100 each   Refills:  6       * blood glucose monitoring test strip   Commonly known as:  no brand specified   Used for:  Poorly controlled type II diabetes mellitus with renal complication (H), Morbid obesity due to excess calories (H)        Use to test blood sugars 4 times daily or as directed please provide per formulary approval patient states Verio.   Quantity:  100 strip   Refills:  3       * blood glucose monitoring test strip   Commonly known as:  ONETOUCH VERIO IQ   Used for:  Type 2  diabetes mellitus with hyperglycemia, with long-term current use of insulin (H)        Use to test blood sugars 3 times daily or as directed.   Quantity:  100 each   Refills:  6       insulin aspart 100 UNIT/ML injection   Commonly known as:  NovoLOG FLEXPEN   Used for:  Type 2 diabetes mellitus with hyperglycemia, with long-term current use of insulin (H)        -199, then 2 units -249, then 4 units -299, then 6 units  -349, then 8 units  -399, then 10 units  -449, then 12 units  -499, then 14 units   Quantity:  15 mL   Refills:  1       insulin glargine 100 UNIT/ML injection   Commonly known as:  LANTUS SOLOSTAR   Used for:  Type 2 diabetes mellitus with hyperglycemia, with long-term current use of insulin (H)        Dose:  49 Units   Inject 49 Units Subcutaneous At Bedtime   Quantity:  15 mL   Refills:  1       insulin pen needle 32G X 4 MM   Used for:  Type 2 diabetes mellitus with hyperglycemia, with long-term current use of insulin (H)        Use 4 pen needles daily or as directed.   Quantity:  150 each   Refills:  3       metFORMIN 1000 MG tablet   Commonly known as:  GLUCOPHAGE   Used for:  Poorly controlled type 2 diabetes mellitus (H)        Dose:  1000 mg   Take 1 tablet (1,000 mg) by mouth 2 times daily (with meals)   Quantity:  180 tablet   Refills:  1       * Notice:  This list has 2 medication(s) that are the same as other medications prescribed for you. Read the directions carefully, and ask your doctor or other care provider to review them with you.      STOP taking     HYDROcodone-acetaminophen 5-325 MG per tablet   Commonly known as:  NORCO           morphine 30 MG 12 hr tablet   Commonly known as:  MS CONTIN           TYLENOL PM EXTRA STRENGTH PO                Where to get your medicines      These medications were sent to [facility] - [address]  [address] [place] [address]     Phone:  [phone]      cyclobenzaprine 5 MG tablet    senna-docusate 8.6-50 MG per tablet         Some of these will need a paper prescription and others can be bought over the counter. Ask your nurse if you have questions.     Bring a paper prescription for each of these medications     oxyCODONE-acetaminophen 5-325 MG per tablet                Protect others around you: Learn how to safely use, store and throw away your medicines at www.disposemymeds.org.        Information about OPIOIDS     PRESCRIPTION OPIOIDS: WHAT YOU NEED TO KNOW    Prescription opioids can be used to help relieve moderate to severe pain and are often prescribed following a surgery or injury, or for certain health conditions. These medications can be an important part of treatment but also come with serious risks. It is important to work with your health care provider to make sure you are getting the safest, most effective care.    WHAT ARE THE RISKS AND SIDE EFFECTS OF OPIOID USE?  Prescription opioids carry serious risks of addiction and overdose, especially with prolonged use. An opioid overdose, often marked by slowed breathing can cause sudden death. The use of prescription opioids can have a number of side effects as well, even when taken as directed:      Tolerance - meaning you might need to take more of a medication for the same pain relief    Physical dependence - meaning you have symptoms of withdrawal when a medication is stopped    Increased sensitivity to pain    Constipation    Nausea, vomiting, and dry mouth    Sleepiness and dizziness    Confusion    Depression    Low levels of testosterone that can result in lower sex drive, energy, and strength    Itching and sweating    RISKS ARE GREATER WITH:    History of drug misuse, substance use disorder, or overdose    Mental health conditions (such as depression or anxiety)    Sleep apnea    Older age (65 years or older)    Pregnancy    Avoid alcohol while taking prescription opioids.   Also, unless  specifically advised by your health care provider, medications to avoid include:    Benzodiazepines (such as Xanax or Valium)    Muscle relaxants (such as Soma or Flexeril)    Hypnotics (such as Ambien or Lunesta)    Other prescription opioids    KNOW YOUR OPTIONS:  Talk to your health care provider about ways to manage your pain that do not involve prescription opioids. Some of these options may actually work better and have fewer risks and side effects:    Pain relievers such as acetaminophen, ibuprofen, and naproxen    Some medications that are also used for depression or seizures    Physical therapy and exercise    Cognitive behavioral therapy, a psychological, goal-directed approach, in which patients learn how to modify physical, behavioral, and emotional triggers of pain and stress    IF YOU ARE PRESCRIBED OPIOIDS FOR PAIN:    Never take opioids in greater amounts or more often than prescribed    Follow up with your primary health care provider and work together to create a plan on how to manage your pain.    Talk about ways to help manage your pain that do not involve prescription opioids    Talk about all concerns and side effects    Help prevent misuse and abuse    Never sell or share prescription opioids    Never use another person's prescription opioids    Store prescription opioids in a secure place and out of reach of others (this may include visitors, children, friends, and family)    Visit www.cdc.gov/drugoverdose to learn about risks of opioid abuse and overdose    If you believe you may be struggling with addiction, tell your health care provider and ask for guidance or call [facility]'s National Helpline at 2-194-661-HELP    LEARN MORE / www.cdc.gov/drugoverdose/prescribing/guideline.html    Safely dispose of unused prescription opioids: Find your local drug take-back programs and more information about the importance of safe disposal at www.doseofreality.mn.gov             Medication List: This is a  list of all your medications and when to take them. Check marks below indicate your daily home schedule. Keep this list as a reference.      Medications           Morning Afternoon Evening Bedtime As Needed    aspirin 81 MG tablet   Take by mouth daily                                blood glucose monitoring lancets   Use to test blood sugar 3 times daily or as directed.  Ok to substitute alternative if insurance prefers.                                * blood glucose monitoring test strip   Commonly known as:  no brand specified   Use to test blood sugars 4 times daily or as directed please provide per formulary approval patient states Verio.                                * blood glucose monitoring test strip   Commonly known as:  ONETOUCH VERIO IQ   Use to test blood sugars 3 times daily or as directed.                                cyclobenzaprine 5 MG tablet   Commonly known as:  FLEXERIL   Take 1-2 tablets (5-10 mg) by mouth 3 times daily as needed for muscle spasms                                insulin aspart 100 UNIT/ML injection   Commonly known as:  NovoLOG FLEXPEN   -199, then 2 units -249, then 4 units -299, then 6 units  -349, then 8 units  -399, then 10 units  -449, then 12 units  -499, then 14 units                                insulin glargine 100 UNIT/ML injection   Commonly known as:  LANTUS SOLOSTAR   Inject 49 Units Subcutaneous At Bedtime                                insulin pen needle 32G X 4 MM   Use 4 pen needles daily or as directed.                                metFORMIN 1000 MG tablet   Commonly known as:  GLUCOPHAGE   Take 1 tablet (1,000 mg) by mouth 2 times daily (with meals)                                oxyCODONE-acetaminophen 5-325 MG per tablet   Commonly known as:  PERCOCET   Take 1-2 tablets by mouth every 4 hours as needed for pain (moderate to severe)   Last time this was given:  1 tablet on 3/13/2018 12:14 PM   Notes to Patient:   One percocet pain pill given at 1215 pm                                polyethylene glycol powder   Commonly known as:  MIRALAX   Take 17 g (1 capful) by mouth daily                                senna-docusate 8.6-50 MG per tablet   Commonly known as:  SENOKOT-S;PERICOLACE   Take 1-2 tablets by mouth 2 times daily Take while on oral narcotics to prevent or treat constipation.                                * Notice:  This list has 2 medication(s) that are the same as other medications prescribed for you. Read the directions carefully, and ask your doctor or other care provider to review them with you.

## 2018-03-13 NOTE — ANESTHESIA PREPROCEDURE EVALUATION
Anesthesia Evaluation     . Pt has had prior anesthetic.     No history of anesthetic complications          ROS/MED HX    ENT/Pulmonary:     (+)sleep apnea, tobacco use, Current use 1 packs/day  doesn't use CPAP , . .    Neurologic:       Cardiovascular:         METS/Exercise Tolerance:     Hematologic:         Musculoskeletal:         GI/Hepatic:     (+) hepatitis (never treated) type C,      (-) other GI/Hepatic   Renal/Genitourinary:         Endo:     (+) type II DM (took 32 units lantus (usual 49) and 8 units Novolog last pm; glucose at 0915 168) Last HgA1c: 11.1 date: 1/9/2018 Using insulin Obesity (BMI 42), .      Psychiatric:         Infectious Disease:         Malignancy:         Other: Comment: Chronic neck and knee pain that he takes MS contin and Norco for   (+) H/O Chronic Pain,                   Physical Exam  Normal systems: cardiovascular and pulmonary    Airway   Mallampati: II  TM distance: >3 FB  Neck ROM: limited    Dental     Cardiovascular       Pulmonary                     Anesthesia Plan      History & Physical Review  History and physical reviewed and following examination; no interval change.    ASA Status:  3 .    NPO Status:  > 8 hours    Plan for General and ETT with Intravenous and Propofol induction. Maintenance will be Inhalation.    PONV prophylaxis:  Ondansetron (or other 5HT-3)  Additional equipment: Videolaryngoscope Tylenol, Ketamine (0.15mg/kg pre-incision and q1H until emergence)      Postoperative Care  Postoperative pain management:  IV analgesics and Oral pain medications.      Consents  Anesthetic plan, risks, benefits and alternatives discussed with:  Patient..        DPreop diagnosis: ventral hernia  Procedure(s):  LAPAROSCOPIC HERNIORRHAPHY VENTRAL  Allergies   Allergen Reactions     No Known Drug Allergies        No current facility-administered medications on file prior to encounter.   Current Outpatient Prescriptions on File Prior to Encounter:  insulin aspart  (NOVOLOG FLEXPEN) 100 UNIT/ML injection -199, then 2 unitsBS 200-249, then 4 unitsBS 250-299, then 6 units -349, then 8 units -399, then 10 units -449, then 12 units -499, then 14 units   Diphenhydramine-APAP, sleep, (TYLENOL PM EXTRA STRENGTH PO) Take by mouth At Bedtime   insulin pen needle 32G X 4 MM Use 4 pen needles daily or as directed.   blood glucose monitoring (ONETOUCH VERIO IQ) test strip Use to test blood sugars 3 times daily or as directed.   blood glucose monitoring (ONE TOUCH DELICA) lancets Use to test blood sugar 3 times daily or as directed.  Ok to substitute alternative if insurance prefers.   metFORMIN (GLUCOPHAGE) 1000 MG tablet Take 1 tablet (1,000 mg) by mouth 2 times daily (with meals)   blood glucose monitoring (NO BRAND SPECIFIED) test strip Use to test blood sugars 4 times daily or as directed please provide per formulary approval patient states Verio.   aspirin 81 MG tablet Take by mouth daily     Hemoglobin   Date Value Ref Range Status   03/06/2018 15.9 13.3 - 17.7 g/dL Final     Potassium   Date Value Ref Range Status   03/06/2018 4.1 3.4 - 5.3 mmol/L Final                       .

## 2018-03-13 NOTE — BRIEF OP NOTE
General Surgery Brief Operative Note    Pre-operative diagnosis: ventral hernia   Post-operative diagnosis Same   Procedure: Procedure(s):  LAPAROSCOPIC VENTRAL HERNIA REPAIR WITH MESH   - Wound Class: I-Clean    Surgeon(s), Assistant(s): Surgeon(s) and Role:     * Jairo Angel MD - Primary     * Heidy Jewell PA-C - Assisting   Estimated blood loss: * No values recorded between 3/13/2018 10:34 AM and 3/13/2018 11:33 AM *   Drains: None   Specimens: * No specimens in log *   Findings: See postop diagnosis   Condition: Stable   Comments:      Heidy Jewell PA-C See dictated operative report for full details

## 2018-03-13 NOTE — OR NURSING
PNDS met, po per I&O sheet. Pt dressed, up in recliner and transported to Phase 2. PNDS met, po per I&O sheet. Pt dressed, up in recliner and transported to Phase 2.

## 2018-03-13 NOTE — TELEPHONE ENCOUNTER
Called pt twice this morning to update him on the plan for his diabetes medications. PCP is on board to begin Victoza.  When the Victoza starts, recommend pt use his correction scale as ordered and to not give additional insulin. He may not need the correction when on the full dose of Victoza after a couple weeks. Will see how his numbers do.      Pt did not answer the phone either attempt. Left vm with c/b number as need his pharmacy to order the Victoza.  Also, need to reiterate that he should use his novolog for correction as outlined in his med list. He may need to have this correction scale reviewed for him.     Has pen needles ordered already. Showed him how to use Victoza yesterday in clinic.     Lizeth Charles, RD LD CDE

## 2018-03-13 NOTE — IP AVS SNAPSHOT
[facility]    [address]    [place] [address]    Phone:  [phone]    Fax:  [phone]                                       After Visit Summary   3/13/2018    Michael Colorado    MRN: [id number]           After Visit Summary Signature Page     I have received my discharge instructions, and my questions have been answered. I have discussed any challenges I see with this plan with the nurse or doctor.    ..........................................................................................................................................  Patient/Patient Representative Signature      ..........................................................................................................................................  Patient Representative Print Name and Relationship to Patient    ..................................................               ................................................  Date                                            Time    ..........................................................................................................................................  Reviewed by Signature/Title    ...................................................              ..............................................  Date                                                            Time

## 2018-03-14 NOTE — TELEPHONE ENCOUNTER
Called pt again today. No answer. Left vm with c/b number for our triage line and told him to let me know a good time to try to call him as well as which pharmacy he prefers.     Lizeth Charles RD LD CDE

## 2018-03-15 ENCOUNTER — TELEPHONE (OUTPATIENT)
Dept: SURGERY | Facility: CLINIC | Age: 63
End: 2018-03-15

## 2018-03-15 NOTE — TELEPHONE ENCOUNTER
"Post Surgical Follow Up Call -     \"Hi, my name is Meron Vargas, I'm a registered nurse, and I am calling from [facility] to follow up and see how things are going for you after your recent surgery.\"    Tell me how you are doing now that you are home?\" Patient reports that he is in pain still, but is doing fine.  Able to get up and move around minimally.  Has not had a bowel movement yet, but is taking the senna.  No issues with appetite, but has not been eating much d/t being constipated.  He denies any issues with urinating.      Discharge Instructions    \"Do you have any questions regarding your discharge instructions?\"  Denies    If applicable:  \"Are you currently taking your post op medication?\"  Yes     If yes, review dosing schedule with patient.  NA    If applicable:  Discuss any pathology results within normal limits.  No    Check EPIC schedule to see if patient has Post Op visit already scheduled.  Patient aware that he will receive a call within the next couple of weeks from a Physician assistant for follow up, but that if he is experiencing ANY questions or concerns to give the clinic a call and he can be seen face to face with Dr. Angel or one of the Physician Assistants.    Preferred Follow up:over the phone      Ok to leave detailed message? Yes        Call Summary      \"If you have questions, we encourage you contact us through the main clinic number (give number).\"    \"Thank you for your time and take care!\"    Meron Vargas RN    "

## 2018-03-16 NOTE — TELEPHONE ENCOUNTER
Called pt again this morning. No answer. Left vm with c/b number. Will try to call again later today if time.    Will route to PCP.     Lizeth Charles, LYNDSEY LD CDE

## 2018-03-19 ENCOUNTER — TELEPHONE (OUTPATIENT)
Dept: SURGERY | Facility: CLINIC | Age: 63
End: 2018-03-19

## 2018-03-19 ENCOUNTER — TELEPHONE (OUTPATIENT)
Dept: EDUCATION SERVICES | Facility: CLINIC | Age: 63
End: 2018-03-19

## 2018-03-19 DIAGNOSIS — G89.18 ACUTE POST-OPERATIVE PAIN: ICD-10-CM

## 2018-03-19 DIAGNOSIS — E11.9 DIABETES MELLITUS, TYPE 2 (H): Primary | ICD-10-CM

## 2018-03-19 RX ORDER — CYCLOBENZAPRINE HCL 5 MG
5-10 TABLET ORAL 3 TIMES DAILY PRN
Qty: 30 TABLET | Refills: 1 | Status: SHIPPED | OUTPATIENT
Start: 2018-03-19 | End: 2018-04-06

## 2018-03-19 RX ORDER — OXYCODONE AND ACETAMINOPHEN 5; 325 MG/1; MG/1
1-2 TABLET ORAL EVERY 6 HOURS PRN
Qty: 37 TABLET | Refills: 0 | Status: SHIPPED | OUTPATIENT
Start: 2018-03-19 | End: 2018-04-06

## 2018-03-19 NOTE — TELEPHONE ENCOUNTER
Tried to call pt again today to try to update him on the plan for Victoza and to find out which pharmacy he would like this rx to go to. No answer. Left  with CDE triage number for c/b.      Lizeth Charles, LYNDSEY RITCHIE CDE

## 2018-03-19 NOTE — TELEPHONE ENCOUNTER
Patient calling wanting refill of percocet.  He has been using 1-2 every four hours, but trying to stretch out time between.  He is unable to utilize ibuprofen.    Encouraged him to continue icing.    Patient has been having bowel movements and appetite has returned.  No other concerns other than pain.    Per verbal order provided by Dr. Angel, #37 percocet will be prescribed as well as refill of flexiril.    Patient aware that the percocet prescription needs to be picked up from  in clinic.  Flexiril to be sent to [facility].    Meron Vargas RN

## 2018-03-20 RX ORDER — LIRAGLUTIDE 6 MG/ML
1.8 INJECTION SUBCUTANEOUS DAILY
Qty: 9 ML | Refills: 1 | Status: SHIPPED | OUTPATIENT
Start: 2018-03-20 | End: 2018-04-17

## 2018-03-20 NOTE — TELEPHONE ENCOUNTER
Pt returned call. Ordered Victoza and explained to him to take 0.6 mg/d for 1 week and then increase to 1.2 mg/d. Will stay on 1.2 mg/d until follow up call with CDE in 4 weeks. Reviewed how to use and store victoza. Explained that he should take Novolog as ordered and NOT extra like he currently does. He is not sure when to resume his other diabetes meds following his surgery. Explained that since he is eating normal now after surgery he should be able to resume them but to call his PCP to double check.  Will route to PCP.      Lizeth Charles RD LD CDE

## 2018-03-22 ENCOUNTER — TELEPHONE (OUTPATIENT)
Dept: EDUCATION SERVICES | Facility: CLINIC | Age: 63
End: 2018-03-22

## 2018-03-22 DIAGNOSIS — E11.65 TYPE 2 DIABETES MELLITUS WITH HYPERGLYCEMIA, WITH LONG-TERM CURRENT USE OF INSULIN (H): Primary | ICD-10-CM

## 2018-03-22 DIAGNOSIS — Z79.4 TYPE 2 DIABETES MELLITUS WITH HYPERGLYCEMIA, WITH LONG-TERM CURRENT USE OF INSULIN (H): Primary | ICD-10-CM

## 2018-03-22 NOTE — TELEPHONE ENCOUNTER
Pt reports he just received a letter form his insurance stating Lantus will not be covered after April 1st, 2018.

## 2018-03-23 RX ORDER — INSULIN GLARGINE 100 [IU]/ML
49 INJECTION, SOLUTION SUBCUTANEOUS AT BEDTIME
Qty: 30 ML | Refills: 1 | Status: SHIPPED | OUTPATIENT
Start: 2018-03-23 | End: 2018-09-13

## 2018-03-23 NOTE — TELEPHONE ENCOUNTER
Spoke to Michael to find out pharmacy of preference, [facility] on [address] in [place].     He got a letter indicating insurance will no longer cover Lantus insulin.   Cancelled Lantus Rx and entered Basaglar (traded glargine for glargine).   Explained to patient the action, dose and time will be the same, he voices understanding.     Tash Martinez RD, LD, CDE

## 2018-03-29 ENCOUNTER — TELEPHONE (OUTPATIENT)
Dept: SURGERY | Facility: CLINIC | Age: 63
End: 2018-03-29

## 2018-03-29 NOTE — TELEPHONE ENCOUNTER
[facility]  Post op call note - Laparoscopic Ventral Hernia Repair    Michael Colorado was called for an update regarding his recovery.  He underwent a laparoscopic ventral hernia repair by Dr. Angel on 3/13/18.  Today he tells me he is doing well and denies any complaints.  He currently does not need any pain medications.  He is eating a normal diet and his bowels are regular.   The patient states he is slowly resuming normal activity.  He states his wounds are healing well and the steri strips are off.  He denies any erythema or drainage at his wounds.  The patient denies fever/chills, n/v/d, abdominal pain, changes in urination or BM, or wound concerns.     He was instructed to continue to keep wounds clean.  He was advised to advance his activity as tolerated with no heavy lifting > 20 lbs or strenuous exercise x 1-2 weeks.  The patient states all of his questions were answered and he understands our discussion.  He agrees to follow up as needed and to call our office with any concerns.    Heidy Jewell PA-C  Please route or send letter to:  Primary Care Provider (PCP)

## 2018-04-06 ENCOUNTER — OFFICE VISIT (OUTPATIENT)
Dept: FAMILY MEDICINE | Facility: CLINIC | Age: 63
End: 2018-04-06
Payer: COMMERCIAL

## 2018-04-06 VITALS
HEART RATE: 84 BPM | BODY MASS INDEX: 36.45 KG/M2 | OXYGEN SATURATION: 100 % | WEIGHT: 315 LBS | SYSTOLIC BLOOD PRESSURE: 127 MMHG | HEIGHT: 78 IN | RESPIRATION RATE: 16 BRPM | TEMPERATURE: 97.2 F | DIASTOLIC BLOOD PRESSURE: 83 MMHG

## 2018-04-06 DIAGNOSIS — Z98.890 STATUS POST HERNIA REPAIR: ICD-10-CM

## 2018-04-06 DIAGNOSIS — G89.4 CHRONIC PAIN SYNDROME: ICD-10-CM

## 2018-04-06 DIAGNOSIS — Z79.4 TYPE 2 DIABETES MELLITUS WITH HYPERGLYCEMIA, WITH LONG-TERM CURRENT USE OF INSULIN (H): Primary | ICD-10-CM

## 2018-04-06 DIAGNOSIS — E11.65 TYPE 2 DIABETES MELLITUS WITH HYPERGLYCEMIA, WITH LONG-TERM CURRENT USE OF INSULIN (H): Primary | ICD-10-CM

## 2018-04-06 DIAGNOSIS — M17.10 PRIMARY LOCALIZED OSTEOARTHROSIS OF LOWER LEG, UNSPECIFIED LATERALITY: ICD-10-CM

## 2018-04-06 DIAGNOSIS — Z87.19 STATUS POST HERNIA REPAIR: ICD-10-CM

## 2018-04-06 DIAGNOSIS — M50.30 DDD (DEGENERATIVE DISC DISEASE), CERVICAL: ICD-10-CM

## 2018-04-06 LAB — HBA1C MFR BLD: 6.8 % (ref 0–6.4)

## 2018-04-06 PROCEDURE — 80307 DRUG TEST PRSMV CHEM ANLYZR: CPT | Mod: 90 | Performed by: FAMILY MEDICINE

## 2018-04-06 PROCEDURE — 99000 SPECIMEN HANDLING OFFICE-LAB: CPT | Performed by: FAMILY MEDICINE

## 2018-04-06 PROCEDURE — 84460 ALANINE AMINO (ALT) (SGPT): CPT | Performed by: FAMILY MEDICINE

## 2018-04-06 PROCEDURE — 83036 HEMOGLOBIN GLYCOSYLATED A1C: CPT | Performed by: FAMILY MEDICINE

## 2018-04-06 PROCEDURE — 99214 OFFICE O/P EST MOD 30 MIN: CPT | Performed by: FAMILY MEDICINE

## 2018-04-06 PROCEDURE — 80048 BASIC METABOLIC PNL TOTAL CA: CPT | Performed by: FAMILY MEDICINE

## 2018-04-06 PROCEDURE — 36415 COLL VENOUS BLD VENIPUNCTURE: CPT | Performed by: FAMILY MEDICINE

## 2018-04-06 RX ORDER — HYDROCODONE BITARTRATE AND ACETAMINOPHEN 5; 325 MG/1; MG/1
1-2 TABLET ORAL EVERY 12 HOURS
Qty: 60 TABLET | Refills: 0 | Status: SHIPPED | OUTPATIENT
Start: 2018-04-09 | End: 2018-04-17

## 2018-04-06 RX ORDER — MORPHINE SULFATE 30 MG/1
30 TABLET, FILM COATED, EXTENDED RELEASE ORAL EVERY 12 HOURS
Qty: 60 TABLET | Refills: 0 | Status: SHIPPED | OUTPATIENT
Start: 2018-04-09 | End: 2018-05-10

## 2018-04-06 NOTE — PROGRESS NOTES
SUBJECTIVE:   Michael Colorado is a 63 year old male who presents to clinic today for the following health issues:          Hospital Follow-up Visit:    Hospital/Nursing Home/IP Rehab Facility: [facility]  Date of Admission: 3/13/18  Date of Discharge: 3/13/18  Reason(s) for Admission: Laparoscopic herniorrhaphy             Problems taking medications regularly:  None       Medication changes since discharge: None       Problems adhering to non-medication therapy:  None    Summary of hospitalization:  [facility] discharge summary reviewed  Diagnostic Tests/Treatments reviewed.  Follow up needed: none  Other Healthcare Providers Involved in Patient s Care:         None  Update since discharge: improved.     Post Discharge Medication Reconciliation: discharge medications reconciled, continue medications without change.  Plan of care communicated with patient     Coding guidelines for this visit:  Type of Medical   Decision Making Face-to-Face Visit       within 7 Days of discharge Face-to-Face Visit        within 14 days of discharge   Moderate Complexity [id number]   High Complexity [id number]                  Problem list and histories reviewed & adjusted, as indicated.  Additional history: as documented    Patient Active Problem List   Diagnosis     Other and unspecified disc disorder     Tinnitus     CARDIOVASCULAR SCREENING; LDL GOAL LESS THAN 130     Advanced directives, counseling/discussion     Primary localized osteoarthrosis, lower leg     Smoker     Morbid obesity with BMI of 45.0-49.9, adult (H)     Morbid obesity (H)     Chronic pain syndrome     Hepatitis C     Obstructive sleep apnea     Tobacco use disorder     Type 2 diabetes mellitus with hyperglycemia (H)     Past Surgical History:   Procedure Laterality Date     LAPAROSCOPIC HERNIORRHAPHY VENTRAL N/A 3/13/2018    Procedure: LAPAROSCOPIC HERNIORRHAPHY VENTRAL;  LAPAROSCOPIC VENTRAL HERNIA REPAIR WITH MESH  ;  Surgeon:  Jairo Angel MD;  Location: [facility]     ORTHOPEDIC SURGERY Bilateral     ARTHROSCOPY - L X 1, R X 2     SURGICAL HISTORY OF -   10/03    Lt knee partial medial meniscectomy       Social History   Substance Use Topics     Smoking status: Current Every Day Smoker     Packs/day: 1.00     Years: 30.00     Types: Cigarettes     Smokeless tobacco: Never Used     Alcohol use No      Comment: quit      Family History   Problem Relation Age of Onset     DIABETES Brother          Current Outpatient Prescriptions   Medication Sig Dispense Refill     [START ON 4/9/2018] morphine (MS CONTIN) 30 MG 12 hr tablet Take 1 tablet (30 mg) by mouth every 12 hours maximum 2 tablet(s) per day 60 tablet 0     [START ON 4/9/2018] HYDROcodone-acetaminophen (NORCO) 5-325 MG per tablet Take 1-2 tablets by mouth every 12 hours maximum 2 tablet(s) per day 60 tablet 0     BASAGLAR 100 UNIT/ML injection Inject 49 Units Subcutaneous At Bedtime 30 mL 1     polyethylene glycol (MIRALAX) powder Take 17 g (1 capful) by mouth daily (Patient taking differently: Take 1 capful by mouth daily as needed ) 510 g 1     blood glucose monitoring (ONETOUCH VERIO IQ) test strip Use to test blood sugars 3 times daily or as directed. 100 each 6     blood glucose monitoring (ONE TOUCH DELICA) lancets Use to test blood sugar 3 times daily or as directed.  Ok to substitute alternative if insurance prefers. 100 each 6     metFORMIN (GLUCOPHAGE) 1000 MG tablet Take 1 tablet (1,000 mg) by mouth 2 times daily (with meals) 180 tablet 1     insulin aspart (NOVOLOG FLEXPEN) 100 UNIT/ML injection -199, then 2 units  -249, then 4 units  -299, then 6 units   -349, then 8 units   -399, then 10 units   -449, then 12 units   -499, then 14 units 15 mL 1     aspirin 81 MG tablet Take by mouth daily       liraglutide (VICTOZA) 18 MG/3ML soln Inject 1.8 mg Subcutaneous daily Start with 0.6 mg daily for 1 week. Then increase to 1.2 mg daily.  (Patient not taking: Reported on 4/6/2018) 9 mL 1     insulin pen needle 32G X 4 MM Use 4 pen needles daily or as directed. (Patient not taking: Reported on 4/6/2018) 150 each 3     blood glucose monitoring (NO BRAND SPECIFIED) test strip Use to test blood sugars 4 times daily or as directed please provide per formulary approval patient states Verio. 100 strip 3     Allergies   Allergen Reactions     No Known Drug Allergies      Recent Labs   Lab Test  03/06/18   1430  01/09/18   1122  02/03/17   1047  11/15/16   1149  07/18/16   0951   12/03/15   1021   02/05/15   0837  04/29/13   1138   A1C   --   11.1*  9.3*   --   7.6*   < >  6.7*   < >  7.0*  6.4*   LDL   --   80   --    --    --    --   71   --   77  112   HDL   --   30*   --    --    --    --   28*   --   32*  35*   TRIG   --   368*   --    --    --    --   312*   --   287*  218*   ALT   --   25   --   33   --    --    --    --   42  50   CR  0.72  0.58*  0.62*   --   0.67   < >  0.60*   < >  0.64*  0.60*   GFRESTIMATED  >90  >90  >90  Non African American GFR Calc     --   >90  Non  GFR Calc     < >  >90  Non  GFR Calc     < >  >90  Non  GFR Calc    >90   GFRESTBLACK  >90  >90  >90  African American GFR Calc     --   >90   GFR Calc     < >  >90   GFR Calc     < >  >90   GFR Calc    >90   POTASSIUM  4.1  4.3  4.6   --   4.0   < >  3.9   < >  4.0  4.1   TSH   --   2.20   --    --    --    --    --    --    --   1.25    < > = values in this interval not displayed.      BP Readings from Last 3 Encounters:   04/06/18 127/83   03/13/18 146/90   03/06/18 134/82    Wt Readings from Last 3 Encounters:   04/06/18 (!) 368 lb (166.9 kg)   03/13/18 (!) 374 lb 2 oz (169.7 kg)   03/06/18 (!) 372 lb 3.2 oz (168.8 kg)                    Reviewed and updated as needed this visit by clinical staff  Allergies       Reviewed and updated as needed this visit by Provider      "    ROS:  Constitutional, HEENT, cardiovascular, pulmonary, gi and gu systems are negative, except as otherwise noted.    OBJECTIVE:     /83 (Cuff Size: Adult Large)  Pulse 84  Temp 97.2  F (36.2  C) (Tympanic)  Resp 16  Ht 6' 7.5\" (2.019 m)  Wt (!) 368 lb (166.9 kg)  SpO2 100%  BMI 40.94 kg/m2  Body mass index is 40.94 kg/(m^2).  GENERAL: healthy, alert and no distress  NECK: no adenopathy, no asymmetry, masses, or scars and thyroid normal to palpation  RESP: lungs clear to auscultation - no rales, rhonchi or wheezes  CV: regular rate and rhythm, normal S1 S2, no S3 or S4, no murmur, click or rub, no peripheral edema and peripheral pulses strong  ABDOMEN: soft, nontender, no hepatosplenomegaly, no masses and bowel sounds normal  MS: no gross musculoskeletal defects noted, no edema        ASSESSMENT/PLAN:   ASSESSMENT / PLAN:  (E11.65,  Z79.4) Type 2 diabetes mellitus with hyperglycemia, with long-term current use of insulin (H)  (primary encounter diagnosis)  Comment: has been improving ,will recheck a1c for further evaluation   Plan: Basic metabolic panel  (Ca, Cl, CO2, Creat,         Gluc, K, Na, BUN), ALT, Hemoglobin A1c, Drug          Screen Comprehensive , Urine with Reported Meds        ([facility]) (Pain Care Package)            (M50.30) DDD (degenerative disc disease), cervical  Comment: stable with current dose of meds, has no side effect, will keep watching sx   Plan: morphine (MS CONTIN) 30 MG 12 hr tablet,         HYDROcodone-acetaminophen (NORCO) 5-325 MG per         tablet, Drug  Screen Comprehensive , Urine with        Reported Meds ([facility]) (Pain Care Package)            (M17.10) Primary localized osteoarthrosis of lower leg, unspecified laterality  Comment: mentioned above   Plan: morphine (MS CONTIN) 30 MG 12 hr tablet,         HYDROcodone-acetaminophen (NORCO) 5-325 MG per         tablet, Drug  Screen Comprehensive , Urine with        Reported Meds ([facility]) (Pain Care Package)        "     (G89.4) Chronic pain syndrome  Comment: mentioned above, has no sign of overdosing nor using other chemicals   Plan: Drug  Screen Comprehensive , Urine with         Reported Meds ([facility]) (Pain Care Package)            (Z98.890,  Z87.19) Status post hernia repair  Comment: improving  Plan: encouraged him to keep working on life style modification and losing wt for fast healing and preventing recurring         FUTURE APPOINTMENTS:       - Follow-up visit in 1 month for med check     Edison New MD  [facility]

## 2018-04-06 NOTE — MR AVS SNAPSHOT
After Visit Summary   4/6/2018    Michael Colorado    MRN: [id number]           Patient Information     Date Of Birth          1955        Visit Information        Provider Department      4/6/2018 11:40 AM Edison New MD [facility]        Today's Diagnoses     Type 2 diabetes mellitus with hyperglycemia, with long-term current use of insulin (H)    -  1    DDD (degenerative disc disease), cervical        Primary localized osteoarthrosis of lower leg, unspecified laterality        Chronic pain syndrome        Status post hernia repair           Follow-ups after your visit        Follow-up notes from your care team     Return in about 1 month (around 5/6/2018) for med check.      Your next 10 appointments already scheduled     Apr 09, 2018 11:15 AM CDT   [facility]   [facility]    [address] [facility], [address]  [facility] [id number] [phone]            May 07, 2018 10:30 AM CDT   Diabetic Education with  DIABETIC ED RESOURCE   [facility] ([facility])    [address] [place] [id number]   [phone]              Who to contact     If you have questions or need follow up information about today's clinic visit or your schedule please contact [facility] directly at [phone].  Normal or non-critical lab and imaging results will be communicated to you by MyChart, letter or phone within 4 business days after the clinic has received the results. If you do not hear from us within 7 days, please contact the clinic through MyChart or phone. If you have a critical or abnormal lab result, we will notify you by phone as soon as possible.  Submit refill requests through [employer] or call your pharmacy and they will forward the refill request to us. Please allow 3 business days for your refill to be completed.          Additional  "Information About Your Visit        MyChart Information     [employer] lets you send messages to your doctor, view your test results, renew your prescriptions, schedule appointments and more. To sign up, go to [email or web address] . Click on \"Log in\" on the left side of the screen, which will take you to the Welcome page. Then click on \"Sign up Now\" on the right side of the page.     You will be asked to enter the access code listed below, as well as some personal information. Please follow the directions to create your username and password.     Your access code is: [email or web address]  Expires: 2018 12:16 PM     Your access code will  in 90 days. If you need help or a new code, please call your [place] [facility] clinic or [phone].        Care EveryWhere ID     This is your Care EveryWhere ID. This could be used by other organizations to access your [facility] medical records  [id number]        Your Vitals Were     Pulse Temperature Respirations Height Pulse Oximetry BMI (Body Mass Index)    84 97.2  F (36.2  C) (Tympanic) 16 6' 7.5\" (2.019 m) 100% 40.94 kg/m2       Blood Pressure from Last 3 Encounters:   18 127/83   18 146/90   18 134/82    Weight from Last 3 Encounters:   18 (!) 368 lb (166.9 kg)   18 (!) 374 lb 2 oz (169.7 kg)   18 (!) 372 lb 3.2 oz (168.8 kg)              We Performed the Following     ALT     Basic metabolic panel  (Ca, Cl, CO2, Creat, Gluc, K, Na, BUN)     Drug  Screen Comprehensive , Urine with Reported Meds (MedTox) (Pain Care Package)     Hemoglobin A1c          Today's Medication Changes          These changes are accurate as of 18 11:57 AM.  If you have any questions, ask your nurse or doctor.               Start taking these medicines.        Dose/Directions    HYDROcodone-acetaminophen 5-325 MG per tablet   Commonly known as:  NORCO   Used for:  DDD (degenerative disc disease), cervical, Primary localized osteoarthrosis of lower leg, unspecified " laterality   Started by:  Edison New MD        Dose:  1-2 tablet   Start taking on:  4/9/2018   Take 1-2 tablets by mouth every 12 hours maximum 2 tablet(s) per day   Quantity:  60 tablet   Refills:  0       morphine 30 MG 12 hr tablet   Commonly known as:  MS CONTIN   Used for:  DDD (degenerative disc disease), cervical, Primary localized osteoarthrosis of lower leg, unspecified laterality   Started by:  Edison New MD        Dose:  30 mg   Start taking on:  4/9/2018   Take 1 tablet (30 mg) by mouth every 12 hours maximum 2 tablet(s) per day   Quantity:  60 tablet   Refills:  0         These medicines have changed or have updated prescriptions.        Dose/Directions    polyethylene glycol powder   Commonly known as:  MIRALAX   This may have changed:    - when to take this  - reasons to take this   Used for:  Slow transit constipation        Dose:  1 capful   Take 17 g (1 capful) by mouth daily   Quantity:  510 g   Refills:  1            Where to get your medicines      Some of these will need a paper prescription and others can be bought over the counter.  Ask your nurse if you have questions.     Bring a paper prescription for each of these medications     HYDROcodone-acetaminophen 5-325 MG per tablet    morphine 30 MG 12 hr tablet                Primary Care Provider Office Phone # Fax #    Edison New [phone]       [address] [place] [address]        Equal Access to Services     CHEN PALUMBO AH: Wilian Rebolledo, waaxda luqadaha, qaybta kaalmada adeegyada, wing wang. So [facility] [phone].    ATENCIÓN: Si habla español, tiene a howe disposición servicios gratuitos de asistencia lingüística. Llame al [phone].    We comply with applicable federal civil rights laws and [place] laws. We do not discriminate on the basis of race, color, national origin, age, disability, sex, sexual orientation, or gender identity.             Thank you!     Thank you for choosing [facility]  for your care. Our goal is always to provide you with excellent care. Hearing back from our patients is one way we can continue to improve our services. Please take a few minutes to complete the written survey that you may receive in the mail after your visit with us. Thank you!             Your Updated Medication List - Protect others around you: Learn how to safely use, store and throw away your medicines at www.disposemymeds.org.          This list is accurate as of 4/6/18 11:57 AM.  Always use your most recent med list.                   Brand Name Dispense Instructions for use Diagnosis    aspirin 81 MG tablet      Take by mouth daily    Type II or unspecified type diabetes mellitus without mention of complication, uncontrolled       BASAGLAR 100 UNIT/ML injection     30 mL    Inject 49 Units Subcutaneous At Bedtime    Type 2 diabetes mellitus with hyperglycemia, with long-term current use of insulin (H)       blood glucose monitoring lancets     100 each    Use to test blood sugar 3 times daily or as directed.  Ok to substitute alternative if insurance prefers.    Type 2 diabetes mellitus with hyperglycemia, with long-term current use of insulin (H)       * blood glucose monitoring test strip    no brand specified    100 strip    Use to test blood sugars 4 times daily or as directed please provide per formulary approval patient states Verio.    Poorly controlled type II diabetes mellitus with renal complication (H), Morbid obesity due to excess calories (H)       * blood glucose monitoring test strip    ONETOUCH VERIO IQ    100 each    Use to test blood sugars 3 times daily or as directed.    Type 2 diabetes mellitus with hyperglycemia, with long-term current use of insulin (H)       HYDROcodone-acetaminophen 5-325 MG per tablet   Start taking on:  4/9/2018    NORCO    60 tablet    Take 1-2 tablets by mouth every 12 hours maximum 2  tablet(s) per day    DDD (degenerative disc disease), cervical, Primary localized osteoarthrosis of lower leg, unspecified laterality       insulin aspart 100 UNIT/ML injection    NovoLOG FLEXPEN    15 mL    -199, then 2 units -249, then 4 units -299, then 6 units  -349, then 8 units  -399, then 10 units  -449, then 12 units  -499, then 14 units    Type 2 diabetes mellitus with hyperglycemia, with long-term current use of insulin (H)       insulin pen needle 32G X 4 MM     150 each    Use 4 pen needles daily or as directed.    Type 2 diabetes mellitus with hyperglycemia, with long-term current use of insulin (H)       liraglutide 18 MG/3ML soln    VICTOZA    9 mL    Inject 1.8 mg Subcutaneous daily Start with 0.6 mg daily for 1 week. Then increase to 1.2 mg daily.    Diabetes mellitus, type 2 (H)       metFORMIN 1000 MG tablet    GLUCOPHAGE    180 tablet    Take 1 tablet (1,000 mg) by mouth 2 times daily (with meals)    Poorly controlled type 2 diabetes mellitus (H)       morphine 30 MG 12 hr tablet   Start taking on:  4/9/2018    MS CONTIN    60 tablet    Take 1 tablet (30 mg) by mouth every 12 hours maximum 2 tablet(s) per day    DDD (degenerative disc disease), cervical, Primary localized osteoarthrosis of lower leg, unspecified laterality       polyethylene glycol powder    MIRALAX    510 g    Take 17 g (1 capful) by mouth daily    Slow transit constipation       * Notice:  This list has 2 medication(s) that are the same as other medications prescribed for you. Read the directions carefully, and ask your doctor or other care provider to review them with you.

## 2018-04-06 NOTE — NURSING NOTE
"Chief Complaint   Patient presents with     Hospital F/U     Diabetes       Initial /83 (Cuff Size: Adult Large)  Pulse 84  Temp 97.2  F (36.2  C) (Tympanic)  Resp 16  Ht 6' 7.5\" (2.019 m)  Wt (!) 368 lb (166.9 kg)  SpO2 100%  BMI 40.94 kg/m2 Estimated body mass index is 40.94 kg/(m^2) as calculated from the following:    Height as of this encounter: 6' 7.5\" (2.019 m).    Weight as of this encounter: 368 lb (166.9 kg).  Medication Reconciliation: complete     Roseann Hampton, CMA    "

## 2018-04-06 NOTE — LETTER
[facility]    04/06/18    Patient: Michael Colorado  YOB: 1955  Medical Record Number: [id number]                                                                  Controlled Substance Agreement  I understand that my care provider has prescribed controlled substances (narcotics, tranquilizers, and/or stimulants) to help manage my condition(s).  I am taking this medicine to help me function or work.  I know that this is strong medicine.  It could have serious side effects and even cause a dependency on the drug.  If I stop these medicines suddenly, I could have severe withdrawal symptoms.    The risks, benefits, and side effects of these medication(s) were explained to me.  I agree that:  1. I will take part in other treatments as advised by my provider.  This may be psychiatry or counseling, physical therapy, behavioral therapy, group treatment, or a referral to a pain clinic.  I will reduce or stop my medicine when my provider tells me to do so.   2. I will take my medicines as prescribed.  I will not change the dose or schedule unless my provider tells me to.  There will be no refills if I  run out early.   I may be contacted at any time without warning and asked to complete a drug test or pill count.   3. I will keep all my appointments at the clinic.  If I miss appointments or fail to follow instructions, my provider may stop my medicine.  4. I will not ask other providers to prescribe controlled substances. And I will not accept controlled substances from other people. If I need another prescribed controlled substance for a new reason, I will notify my provider within one business day.  5. If I enroll in the [place] Medical Marijuana program, I will tell my provider.  I will also sign an agreement to share my medical records with my provider.  6. I will use one pharmacy to fill all of my controlled substance prescriptions.  If my prescription is mailed to my pharmacy, it may take  5 to 7 days for my medicine to be ready.  7. I understand that my provider, clinic care team, and pharmacy can track controlled substance prescriptions from other providers through a central database (prescription monitoring program).  8. I will bring in my list of medications (or my medicine bottles) each time I come to the clinic.  REV- 04/2016                                                                                                                                            Page 1 of 2      [facility]    04/06/18    Patient: Michael Colorado  YOB: 1955  Medical Record Number: [id number]    9. Refills of controlled substances will be made only during office hours.  It is up to me to make sure that I do not run out of my medicines on weekends or holidays.    10. I am responsible for my prescriptions.  If the medicine is lost or stolen, it will not be replaced.   I also agree not to share these medicines with anyone.  11. I agree to not use ANY illegal or recreational drugs.  This includes marijuana, cocaine, bath salts or other drugs.  I agree not to use alcohol unless my provider says I may.  I agree to give urine samples whenever asked.  If I fail to give a urine sample, the provider may stop my medicine.     12. I will tell my nurse or provider right away if I become pregnant or have a new medical problem treated outside of [facility].  13. I understand that this medicine can affect my thinking and judgment.  It may be unsafe for me to drive, use machinery and do dangerous tasks.  I will not do any of these things until I know how the medicine affects me.  If my dose changes, I will wait to see how it affects me.  I will contact my provider if I have concerns about medicine side effects.  I understand that if I do not follow any of the conditions above, my prescriptions or treatment may be stopped.    I agree that my provider, clinic care team, and pharmacy may work with  any city, state or federal law enforcement agency that investigates the misuse, sale, or other diversion of my controlled medicine. I will allow my provider to discuss my care with or share a copy of this agreement with any other treating provider, pharmacy or emergency room where I receive care.  I agree to give up (waive) any right of privacy or confidentiality with respect to these authorizations.   I have read this agreement and have asked questions about anything I did not understand.   ___________________________________    ___________________________  Patient Signature                                                           Date and Time  ___________________________________     ____________________________  Witness                                                                            Date and Time  ___________________________________  Edison New MD  REV-  04/2016                                                                                                                                                                 Page 2 of 2

## 2018-04-06 NOTE — LETTER
April 9, 2018      Michael Colorado  [address] [facility] [address]        Dear ,    We are writing to inform you of your test results.    You maybe able to check the lab results via [employer], it showed your A1c has been improving, and electrolyte balance and liver function were all normal.  Please stay on the current dose of medicine and keep working on life style modification.     Resulted Orders   Basic metabolic panel  (Ca, Cl, CO2, Creat, Gluc, K, Na, BUN)   Result Value Ref Range    Sodium 135 133 - 144 mmol/L    Potassium 4.2 3.4 - 5.3 mmol/L    Chloride 100 94 - 109 mmol/L    Carbon Dioxide 24 20 - 32 mmol/L    Anion Gap 11 3 - 14 mmol/L    Glucose 120 (H) 70 - 99 mg/dL      Comment:      Non Fasting    Urea Nitrogen 8 7 - 30 mg/dL    Creatinine 0.64 (L) 0.66 - 1.25 mg/dL    GFR Estimate >90 >60 mL/min/1.7m2      Comment:      Non  GFR Calc    GFR Estimate If Black >90 >60 mL/min/1.7m2      Comment:       GFR Calc    Calcium 8.9 8.5 - 10.1 mg/dL   ALT   Result Value Ref Range    ALT 18 0 - 70 U/L   Hemoglobin A1c   Result Value Ref Range    Hemoglobin A1C 6.8 (H) 0 - 6.4 %      Comment:      Normal <5.7% Prediabetes 5.7-6.4%  Diabetes 6.5% or higher - adopted from ADA   consensus guidelines.         If you have any questions or concerns, please call the clinic at the number listed above.       Sincerely,        Edison New MD

## 2018-04-07 LAB
ALT SERPL W P-5'-P-CCNC: 18 U/L (ref 0–70)
ANION GAP SERPL CALCULATED.3IONS-SCNC: 11 MMOL/L (ref 3–14)
BUN SERPL-MCNC: 8 MG/DL (ref 7–30)
CALCIUM SERPL-MCNC: 8.9 MG/DL (ref 8.5–10.1)
CHLORIDE SERPL-SCNC: 100 MMOL/L (ref 94–109)
CO2 SERPL-SCNC: 24 MMOL/L (ref 20–32)
CREAT SERPL-MCNC: 0.64 MG/DL (ref 0.66–1.25)
GFR SERPL CREATININE-BSD FRML MDRD: >90 ML/MIN/1.7M2
GLUCOSE SERPL-MCNC: 120 MG/DL (ref 70–99)
POTASSIUM SERPL-SCNC: 4.2 MMOL/L (ref 3.4–5.3)
SODIUM SERPL-SCNC: 135 MMOL/L (ref 133–144)

## 2018-04-12 LAB — PAIN DRUG SCR UR W RPTD MEDS: NORMAL

## 2018-04-17 ENCOUNTER — OFFICE VISIT (OUTPATIENT)
Dept: FAMILY MEDICINE | Facility: CLINIC | Age: 63
End: 2018-04-17
Payer: COMMERCIAL

## 2018-04-17 VITALS
HEIGHT: 78 IN | SYSTOLIC BLOOD PRESSURE: 139 MMHG | HEART RATE: 88 BPM | DIASTOLIC BLOOD PRESSURE: 84 MMHG | WEIGHT: 315 LBS | BODY MASS INDEX: 36.45 KG/M2 | OXYGEN SATURATION: 99 % | TEMPERATURE: 97.4 F | RESPIRATION RATE: 16 BRPM

## 2018-04-17 DIAGNOSIS — F19.20 CHEMICAL DEPENDENCY (H): Primary | ICD-10-CM

## 2018-04-17 DIAGNOSIS — K59.01 SLOW TRANSIT CONSTIPATION: ICD-10-CM

## 2018-04-17 DIAGNOSIS — E11.65 POORLY CONTROLLED TYPE 2 DIABETES MELLITUS (H): ICD-10-CM

## 2018-04-17 DIAGNOSIS — G89.29 OTHER CHRONIC PAIN: ICD-10-CM

## 2018-04-17 PROCEDURE — 99214 OFFICE O/P EST MOD 30 MIN: CPT | Performed by: FAMILY MEDICINE

## 2018-04-17 RX ORDER — LIRAGLUTIDE 6 MG/ML
INJECTION SUBCUTANEOUS
Refills: 1 | COMMUNITY
Start: 2018-03-20 | End: 2018-08-08

## 2018-04-17 RX ORDER — POLYETHYLENE GLYCOL 3350 17 G/17G
1 POWDER, FOR SOLUTION ORAL DAILY
Qty: 510 G | Refills: 1 | Status: SHIPPED | OUTPATIENT
Start: 2018-04-17 | End: 2018-06-25

## 2018-04-17 NOTE — MR AVS SNAPSHOT
"              After Visit Summary   4/17/2018    Michael Colorado    MRN: [id number]           Patient Information     Date Of Birth          1955        Visit Information        Provider Department      4/17/2018 10:20 AM Edison New MD [facility]        Today's Diagnoses     Chemical dependency (H)    -  1    Slow transit constipation        Poorly controlled type 2 diabetes mellitus (H)        Other chronic pain           Follow-ups after your visit        Follow-up notes from your care team     Return in about 1 month (around 5/17/2018) for med check.      Your next 10 appointments already scheduled     May 07, 2018 10:30 AM CDT   Diabetic Education with [facility] DIABETIC ED RESOURCE   [facility] ([facility])    [address] [place] [id number]   [phone]              Who to contact     If you have questions or need follow up information about today's clinic visit or your schedule please contact [facility] [place] [place] directly at [phone].  Normal or non-critical lab and imaging results will be communicated to you by [employer], letter or phone within 4 business days after the clinic has received the results. If you do not hear from us within 7 days, please contact the clinic through [employer] or phone. If you have a critical or abnormal lab result, we will notify you by phone as soon as possible.  Submit refill requests through [employer] or call your pharmacy and they will forward the refill request to us. Please allow 3 business days for your refill to be completed.          Additional Information About Your Visit        [employer] Information     [employer] lets you send messages to your doctor, view your test results, renew your prescriptions, schedule appointments and more. To sign up, go to [email or web address] . Click on \"Log in\" on the left side of the screen, which will take you to the Welcome page. Then click on \"Sign " "up Now\" on the right side of the page.     You will be asked to enter the access code listed below, as well as some personal information. Please follow the directions to create your username and password.     Your access code is: [email or web address]  Expires: 2018 10:43 AM     Your access code will  in 90 days. If you need help or a new code, please call your [facility] clinic or [phone].        Care EveryWhere ID     This is your Care EveryWhere ID. This could be used by other organizations to access your [facility] medical records  [id number]        Your Vitals Were     Pulse Temperature Respirations Height Pulse Oximetry BMI (Body Mass Index)    88 97.4  F (36.3  C) (Tympanic) 16 6' 7.5\" (2.019 m) 99% 40.83 kg/m2       Blood Pressure from Last 3 Encounters:   18 139/84   18 127/83   18 146/90    Weight from Last 3 Encounters:   18 (!) 367 lb (166.5 kg)   18 (!) 368 lb (166.9 kg)   18 (!) 374 lb 2 oz (169.7 kg)              Today, you had the following     No orders found for display         Today's Medication Changes          These changes are accurate as of 18 10:43 AM.  If you have any questions, ask your nurse or doctor.               These medicines have changed or have updated prescriptions.        Dose/Directions    * polyethylene glycol powder   Commonly known as:  MIRALAX   This may have changed:    - when to take this  - reasons to take this   Used for:  Slow transit constipation        Dose:  1 capful   Take 17 g (1 capful) by mouth daily   Quantity:  510 g   Refills:  1       * polyethylene glycol powder   Commonly known as:  MIRALAX   This may have changed:  You were already taking a medication with the same name, and this prescription was added. Make sure you understand how and when to take each.   Used for:  Slow transit constipation   Changed by:  Edison New MD        Dose:  1 capful   Take 17 g (1 capful) by mouth daily   Quantity:  510 g   Refills: "  1       * Notice:  This list has 2 medication(s) that are the same as other medications prescribed for you. Read the directions carefully, and ask your doctor or other care provider to review them with you.      Stop taking these medicines if you haven't already. Please contact your care team if you have questions.     HYDROcodone-acetaminophen 5-325 MG per tablet   Commonly known as:  NORCO   Stopped by:  Edison New MD                Where to get your medicines      These medications were sent to [facility] [address] [place] [address] [employer]  AT [facility]  & [facility]  [address] [facility] [address]     Phone:  [phone]     metFORMIN 1000 MG tablet    polyethylene glycol powder                Primary Care Provider Office Phone # Fax #    Edison New [phone]       [address] [facility] [address]        Equal Access to Services     [facility]: Hadii nidia ku hadasho Soomaali, waaxda luqadaha, qaybta kaalmada adeegyada, waxay merrittin haycesarn lana gutierrez . So [facility] [phone].    ATENCIÓN: Si habla español, tiene a howe disposición servicios gratuitos de asistencia lingüística. Ll[facility] [phone].    We comply with applicable federal civil rights laws and [place] laws. We do not discriminate on the basis of race, color, national origin, age, disability, sex, sexual orientation, or gender identity.            Thank you!     Thank you for choosing [facility]  for your care. Our goal is always to provide you with excellent care. Hearing back from our patients is one way we can continue to improve our services. Please take a few minutes to complete the written survey that you may receive in the mail after your visit with us. Thank you!             Your Updated Medication List - Protect others around you: Learn how to safely use, store and throw away your medicines at www.disposemymeds.org.          This list is  accurate as of 4/17/18 10:43 AM.  Always use your most recent med list.                   Brand Name Dispense Instructions for use Diagnosis    aspirin 81 MG tablet      Take by mouth daily    Type II or unspecified type diabetes mellitus without mention of complication, uncontrolled       BASAGLAR 100 UNIT/ML injection     30 mL    Inject 49 Units Subcutaneous At Bedtime    Type 2 diabetes mellitus with hyperglycemia, with long-term current use of insulin (H)       blood glucose monitoring lancets     100 each    Use to test blood sugar 3 times daily or as directed.  Ok to substitute alternative if insurance prefers.    Type 2 diabetes mellitus with hyperglycemia, with long-term current use of insulin (H)       * blood glucose monitoring test strip    no brand specified    100 strip    Use to test blood sugars 4 times daily or as directed please provide per formulary approval patient states Verio.    Poorly controlled type II diabetes mellitus with renal complication (H), Morbid obesity due to excess calories (H)       * blood glucose monitoring test strip    ONETOUCH VERIO IQ    100 each    Use to test blood sugars 3 times daily or as directed.    Type 2 diabetes mellitus with hyperglycemia, with long-term current use of insulin (H)       insulin aspart 100 UNIT/ML injection    NovoLOG FLEXPEN    15 mL    -199, then 2 units -249, then 4 units -299, then 6 units  -349, then 8 units  -399, then 10 units  -449, then 12 units  -499, then 14 units    Type 2 diabetes mellitus with hyperglycemia, with long-term current use of insulin (H)       insulin pen needle 32G X 4 MM     150 each    Use 4 pen needles daily or as directed.    Type 2 diabetes mellitus with hyperglycemia, with long-term current use of insulin (H)       metFORMIN 1000 MG tablet    GLUCOPHAGE    180 tablet    Take 1 tablet (1,000 mg) by mouth 2 times daily (with meals)    Poorly controlled type 2 diabetes  mellitus (H)       morphine 30 MG 12 hr tablet    MS CONTIN    60 tablet    Take 1 tablet (30 mg) by mouth every 12 hours maximum 2 tablet(s) per day    DDD (degenerative disc disease), cervical, Primary localized osteoarthrosis of lower leg, unspecified laterality       * polyethylene glycol powder    MIRALAX    510 g    Take 17 g (1 capful) by mouth daily    Slow transit constipation       * polyethylene glycol powder    MIRALAX    510 g    Take 17 g (1 capful) by mouth daily    Slow transit constipation       VICTOZA PEN 18 MG/3ML soln   Generic drug:  liraglutide           * Notice:  This list has 4 medication(s) that are the same as other medications prescribed for you. Read the directions carefully, and ask your doctor or other care provider to review them with you.

## 2018-04-17 NOTE — PROGRESS NOTES
SUBJECTIVE:   Michael Colorado is a 63 year old male who presents to clinic today for the following health issues:      Results         Description (location/character/radiation): Pt is here to go over his recent results.        Problem list and histories reviewed & adjusted, as indicated.  Additional history: as documented    Patient Active Problem List   Diagnosis     Other and unspecified disc disorder     Tinnitus     CARDIOVASCULAR SCREENING; LDL GOAL LESS THAN 130     Advanced directives, counseling/discussion     Primary localized osteoarthrosis, lower leg     Smoker     Morbid obesity with BMI of 45.0-49.9, adult (H)     Morbid obesity (H)     Chronic pain syndrome     Hepatitis C     Obstructive sleep apnea     Tobacco use disorder     Type 2 diabetes mellitus with hyperglycemia (H)     Chemical dependency (H)     Past Surgical History:   Procedure Laterality Date     LAPAROSCOPIC HERNIORRHAPHY VENTRAL N/A 3/13/2018    Procedure: LAPAROSCOPIC HERNIORRHAPHY VENTRAL;  LAPAROSCOPIC VENTRAL HERNIA REPAIR WITH MESH  ;  Surgeon: Jairo Angel MD;  [facility]     ORTHOPEDIC SURGERY Bilateral     ARTHROSCOPY - L X 1, R X 2     SURGICAL HISTORY OF -   10/03    Lt knee partial medial meniscectomy       Social History   Substance Use Topics     Smoking status: Current Every Day Smoker     Packs/day: 1.00     Years: 30.00     Types: Cigarettes     Smokeless tobacco: Never Used     Alcohol use No      Comment: quit      Family History   Problem Relation Age of Onset     DIABETES Brother          Current Outpatient Prescriptions   Medication Sig Dispense Refill     VICTOZA PEN 18 MG/3ML soln   1     polyethylene glycol (MIRALAX) powder Take 17 g (1 capful) by mouth daily 510 g 1     metFORMIN (GLUCOPHAGE) 1000 MG tablet Take 1 tablet (1,000 mg) by mouth 2 times daily (with meals) 180 tablet 1     morphine (MS CONTIN) 30 MG 12 hr tablet Take 1 tablet (30 mg) by mouth every 12 hours maximum 2 tablet(s) per day 60  tablet 0     BASAGLAR 100 UNIT/ML injection Inject 49 Units Subcutaneous At Bedtime 30 mL 1     blood glucose monitoring (ONETOUCH VERIO IQ) test strip Use to test blood sugars 3 times daily or as directed. 100 each 6     blood glucose monitoring (ONE TOUCH DELICA) lancets Use to test blood sugar 3 times daily or as directed.  Ok to substitute alternative if insurance prefers. 100 each 6     insulin aspart (NOVOLOG FLEXPEN) 100 UNIT/ML injection -199, then 2 units  -249, then 4 units  -299, then 6 units   -349, then 8 units   -399, then 10 units   -449, then 12 units   -499, then 14 units 15 mL 1     blood glucose monitoring (NO BRAND SPECIFIED) test strip Use to test blood sugars 4 times daily or as directed please provide per formulary approval patient states Verio. 100 strip 3     aspirin 81 MG tablet Take by mouth daily       polyethylene glycol (MIRALAX) powder Take 17 g (1 capful) by mouth daily (Patient taking differently: Take 1 capful by mouth daily as needed ) 510 g 1     insulin pen needle 32G X 4 MM Use 4 pen needles daily or as directed. (Patient not taking: Reported on 4/6/2018) 150 each 3     [DISCONTINUED] metFORMIN (GLUCOPHAGE) 1000 MG tablet Take 1 tablet (1,000 mg) by mouth 2 times daily (with meals) 180 tablet 1     Allergies   Allergen Reactions     No Known Drug Allergies      Recent Labs   Lab Test  04/06/18   1158  03/06/18   1430  01/09/18   1122  02/03/17   1047  11/15/16   1149   12/03/15   1021   02/05/15   0837  04/29/13   1138   A1C  6.8*   --   11.1*  9.3*   --    < >  6.7*   < >  7.0*  6.4*   LDL   --    --   80   --    --    --   71   --   77  112   HDL   --    --   30*   --    --    --   28*   --   32*  35*   TRIG   --    --   368*   --    --    --   312*   --   287*  218*   ALT  18   --   25   --   33   --    --    --   42  50   CR  0.64*  0.72  0.58*  0.62*   --    < >  0.60*   < >  0.64*  0.60*   GFRESTIMATED  >90  >90  >90  >90  Non  " GFR Calc     --    < >  >90  Non  GFR Calc     < >  >90  Non  GFR Calc    >90   GFRESTBLACK  >90  >90  >90  >90  African American GFR Calc     --    < >  >90   GFR Calc     < >  >90   GFR Calc    >90   POTASSIUM  4.2  4.1  4.3  4.6   --    < >  3.9   < >  4.0  4.1   TSH   --    --   2.20   --    --    --    --    --    --   1.25    < > = values in this interval not displayed.      BP Readings from Last 3 Encounters:   04/17/18 139/84   04/06/18 127/83   03/13/18 146/90    Wt Readings from Last 3 Encounters:   04/17/18 (!) 367 lb (166.5 kg)   04/06/18 (!) 368 lb (166.9 kg)   03/13/18 (!) 374 lb 2 oz (169.7 kg)                    Reviewed and updated as needed this visit by clinical staff       Reviewed and updated as needed this visit by Provider         ROS:  Constitutional, HEENT, cardiovascular, pulmonary, gi and gu systems are negative, except as otherwise noted.    OBJECTIVE:     /84 (Cuff Size: Adult Large)  Pulse 88  Temp 97.4  F (36.3  C) (Tympanic)  Resp 16  Ht 6' 7.5\" (2.019 m)  Wt (!) 367 lb (166.5 kg)  SpO2 99%  BMI 40.83 kg/m2  Body mass index is 40.83 kg/(m^2).  GENERAL: healthy, alert and no distress  NECK: no adenopathy, no asymmetry, masses, or scars and thyroid normal to palpation  RESP: lungs clear to auscultation - no rales, rhonchi or wheezes  CV: regular rate and rhythm, normal S1 S2, no S3 or S4, no murmur, click or rub, no peripheral edema and peripheral pulses strong  ABDOMEN: soft, nontender, no hepatosplenomegaly, no masses and bowel sounds normal  MS: no gross musculoskeletal defects noted, no edema        ASSESSMENT/PLAN:   ASSESSMENT / PLAN:  (F19.20) Chemical dependency (H)  (primary encounter diagnosis)  Comment: found methadone at [facility], and current prescribed meds were not detected  He said he has used his friend methadone after surgery to control pain  However, since he has controlled " substance agreements with me, we will have him to start tapering process of meds, will stop norco after this prescription, and also will cut the dose of MS contin from 30mg to 15mg next month  Pt acknowledged and agreed with the plan    Plan: mentioned above     (K59.01) Slow transit constipation  Comment: has worsening with recent hernia surgery, will have him to try miralax   Plan: polyethylene glycol (MIRALAX) powder            (E11.65) Poorly controlled type 2 diabetes mellitus (H)  Comment: has been stable with current dose of insulin and metformin, will keep watching sx   Plan: metFORMIN (GLUCOPHAGE) 1000 MG tablet            (G89.29) Other chronic pain  Comment: mentioned above   Plan: mentioned above     FUTURE APPOINTMENTS:       - Follow-up visit in 1 months for med check and tapering MS contin     Edison New MD  [facility]

## 2018-04-17 NOTE — NURSING NOTE
"Chief Complaint   Patient presents with     Results       Initial /84 (Cuff Size: Adult Large)  Pulse 88  Temp 97.4  F (36.3  C) (Tympanic)  Resp 16  Ht 6' 7.5\" (2.019 m)  Wt (!) 367 lb (166.5 kg)  SpO2 99%  BMI 40.83 kg/m2 Estimated body mass index is 40.83 kg/(m^2) as calculated from the following:    Height as of this encounter: 6' 7.5\" (2.019 m).    Weight as of this encounter: 367 lb (166.5 kg).  Medication Reconciliation: complete   Roseann Hampton, CMA    "

## 2018-04-28 DIAGNOSIS — Z79.4 TYPE 2 DIABETES MELLITUS WITH HYPERGLYCEMIA, WITH LONG-TERM CURRENT USE OF INSULIN (H): ICD-10-CM

## 2018-04-28 DIAGNOSIS — E11.65 TYPE 2 DIABETES MELLITUS WITH HYPERGLYCEMIA, WITH LONG-TERM CURRENT USE OF INSULIN (H): ICD-10-CM

## 2018-04-30 RX ORDER — INSULIN ASPART 100 [IU]/ML
INJECTION, SOLUTION INTRAVENOUS; SUBCUTANEOUS
Qty: 15 ML | Refills: 0 | Status: SHIPPED | OUTPATIENT
Start: 2018-04-30 | End: 2018-06-25

## 2018-04-30 NOTE — TELEPHONE ENCOUNTER
Prescription approved per [facility] Refill Protocol.  Gricel Martinez RN

## 2018-04-30 NOTE — TELEPHONE ENCOUNTER
"Requested Prescriptions   Pending Prescriptions Disp Refills     NOVOLOG FLEXPEN 100 UNIT/ML soln [Pharmacy Med Name: NOVOLOG FLEXPEN INJ 3ML (ORANGE)] 15 mL 0    Last Written Prescription Date:  01/10/2018  Last Fill Quantity: 15 mL,  # refills: 1   Last office visit: 4/17/2018 with prescribing provider:  Edison New   Future Office Visit:     Sig: SEE NOTES    Short Acting Insulin Protocol Passed    4/28/2018  4:11 PM       Passed - Blood pressure less than 140/90 in past 6 months    BP Readings from Last 3 Encounters:   04/17/18 139/84   04/06/18 127/83   03/13/18 146/90                Passed - LDL on file in past 12 months    Recent Labs   Lab Test  01/09/18   1122   LDL  80            Passed - Microalbumin on file in past 12 months    Recent Labs   Lab Test  01/09/18   1122   MICROL  6   UMALCR  7.11            Passed - Serum creatinine on file in past 12 months    Recent Labs   Lab Test  04/06/18   1158   CR  0.64*            Passed - HgbA1C in past 3 or 6 months    Recent Labs   Lab Test  04/06/18   1158   A1C  6.8*            Passed - Patient is age 18 or older       Passed - Recent (6 mo) or future (30 days) visit within the authorizing provider's specialty    Patient had office visit in the last 6 months or has a visit in the next 30 days with authorizing provider or within the authorizing provider's specialty.  See \"Patient Info\" tab in inbasket, or \"Choose Columns\" in Meds & Orders section of the refill encounter.                "

## 2018-05-10 ENCOUNTER — OFFICE VISIT (OUTPATIENT)
Dept: FAMILY MEDICINE | Facility: CLINIC | Age: 63
End: 2018-05-10
Payer: COMMERCIAL

## 2018-05-10 VITALS
HEIGHT: 78 IN | HEART RATE: 87 BPM | TEMPERATURE: 97.9 F | DIASTOLIC BLOOD PRESSURE: 82 MMHG | RESPIRATION RATE: 16 BRPM | SYSTOLIC BLOOD PRESSURE: 132 MMHG | BODY MASS INDEX: 36.45 KG/M2 | WEIGHT: 315 LBS | OXYGEN SATURATION: 98 %

## 2018-05-10 DIAGNOSIS — M17.10 PRIMARY LOCALIZED OSTEOARTHROSIS OF LOWER LEG, UNSPECIFIED LATERALITY: ICD-10-CM

## 2018-05-10 DIAGNOSIS — M50.30 DDD (DEGENERATIVE DISC DISEASE), CERVICAL: ICD-10-CM

## 2018-05-10 DIAGNOSIS — E11.65 TYPE 2 DIABETES MELLITUS WITH HYPERGLYCEMIA, WITH LONG-TERM CURRENT USE OF INSULIN (H): Primary | ICD-10-CM

## 2018-05-10 DIAGNOSIS — Z79.4 TYPE 2 DIABETES MELLITUS WITH HYPERGLYCEMIA, WITH LONG-TERM CURRENT USE OF INSULIN (H): Primary | ICD-10-CM

## 2018-05-10 PROCEDURE — 99214 OFFICE O/P EST MOD 30 MIN: CPT | Performed by: FAMILY MEDICINE

## 2018-05-10 RX ORDER — MORPHINE SULFATE 15 MG/1
15 TABLET, FILM COATED, EXTENDED RELEASE ORAL EVERY 12 HOURS
Qty: 60 TABLET | Refills: 0 | Status: SHIPPED | OUTPATIENT
Start: 2018-05-10 | End: 2018-06-06

## 2018-05-10 NOTE — MR AVS SNAPSHOT
After Visit Summary   5/10/2018    Michael Colorado    MRN: [id number]           Patient Information     Date Of Birth          1955        Visit Information        Provider Department      5/10/2018 1:00 PM Edison New MD [facility] [place]        Today's Diagnoses     Type 2 diabetes mellitus with hyperglycemia, with long-term current use of insulin (H)    -  1    DDD (degenerative disc disease), cervical        Primary localized osteoarthrosis of lower leg, unspecified laterality           Follow-ups after your visit        Additional Services     OPHTHALMOLOGY ADULT REFERRAL       Your provider has referred you to: [place]: [facility]  [phone]  [email or web address]    Please be aware that coverage of these services is subject to the terms and limitations of your health insurance plan.  Call member services at your health plan with any benefit or coverage questions.      Please bring the following with you to your appointment:    (1) Any X-Rays, CTs or MRIs which have been performed.  Contact the facility where they were done to arrange for  prior to your scheduled appointment.    (2) List of current medications  (3) This referral request   (4) Any documents/labs given to you for this referral                  Follow-up notes from your care team     Return in about 3 months (around 8/10/2018) for med check.      Who to contact     If you have questions or need follow up information about today's clinic visit or your schedule please contact [facility] [place] directly at [phone].  Normal or non-critical lab and imaging results will be communicated to you by MyChart, letter or phone within 4 business days after the clinic has received the results. If you do not hear from us within 7 days, please contact the clinic through MyChart or phone. If you have a critical or abnormal lab result, we will notify you by phone as soon  "as possible.  Submit refill requests through [facility] or call your pharmacy and they will forward the refill request to us. Please allow 3 business days for your refill to be completed.          Additional Information About Your Visit        [employer] Information     [facility] lets you send messages to your doctor, view your test results, renew your prescriptions, schedule appointments and more. To sign up, go to [email or web address] . Click on \"Log in\" on the left side of the screen, which will take you to the Welcome page. Then click on \"Sign up Now\" on the right side of the page.     You will be asked to enter the access code listed below, as well as some personal information. Please follow the directions to create your username and password.     Your access code is: [email or web address]  Expires: 2018 10:43 AM     Your access code will  in 90 days. If you need help or a new code, please call your [facility] clinic or [phone].        Care EveryWhere ID     This is your Care EveryWhere ID. This could be used by other organizations to access your [facility] medical records  [id number]        Your Vitals Were     Pulse Temperature Respirations Height Pulse Oximetry BMI (Body Mass Index)    87 97.9  F (36.6  C) (Tympanic) 16 6' 7.5\" (2.019 m) 98% 40.27 kg/m2       Blood Pressure from Last 3 Encounters:   05/10/18 132/82   18 139/84   18 127/83    Weight from Last 3 Encounters:   05/10/18 (!) 362 lb (164.2 kg)   18 (!) 367 lb (166.5 kg)   18 (!) 368 lb (166.9 kg)              We Performed the Following     OPHTHALMOLOGY ADULT REFERRAL          Today's Medication Changes          These changes are accurate as of 5/10/18  1:19 PM.  If you have any questions, ask your nurse or doctor.               These medicines have changed or have updated prescriptions.        Dose/Directions    morphine 15 MG 12 hr tablet   Commonly known as:  MS CONTIN   This may have changed:    - medication strength  - " how much to take   Used for:  DDD (degenerative disc disease), cervical, Primary localized osteoarthrosis of lower leg, unspecified laterality   Changed by:  Edison New MD        Dose:  15 mg   Take 1 tablet (15 mg) by mouth every 12 hours maximum 2 tablet(s) per day   Quantity:  60 tablet   Refills:  0       * polyethylene glycol powder   Commonly known as:  MIRALAX   This may have changed:    - when to take this  - reasons to take this   Used for:  Slow transit constipation        Dose:  1 capful   Take 17 g (1 capful) by mouth daily   Quantity:  510 g   Refills:  1       * polyethylene glycol powder   Commonly known as:  MIRALAX   This may have changed:  Another medication with the same name was changed. Make sure you understand how and when to take each.   Used for:  Slow transit constipation        Dose:  1 capful   Take 17 g (1 capful) by mouth daily   Quantity:  510 g   Refills:  1       * Notice:  This list has 2 medication(s) that are the same as other medications prescribed for you. Read the directions carefully, and ask your doctor or other care provider to review them with you.         Where to get your medicines      Some of these will need a paper prescription and others can be bought over the counter.  Ask your nurse if you have questions.     Bring a paper prescription for each of these medications     morphine 15 MG 12 hr tablet               Information about OPIOIDS     PRESCRIPTION OPIOIDS: WHAT YOU NEED TO KNOW   You have a prescription for an opioid (narcotic) pain medicine. Opioids can cause addiction. If you have a history of chemical dependency of any type, you are at a higher risk of becoming addicted to opioids. Only take this medicine after all other options have been tried. Take it for as short a time and as few doses as possible.     Do not:    Drive. If you drive while taking these medicines, you could be arrested for driving under the influence (DUI).    Operate heavy  machinery    Do any other dangerous activities while taking these medicines.     Drink any alcohol while taking these medicines.      Take with any other medicines that contain acetaminophen. Read all labels carefully. Look for the word  acetaminophen  or  Tylenol.  Ask your pharmacist if you have questions or are unsure.    Store your pills in a secure place, locked if possible. We will not replace any lost or stolen medicine. If you don t finish your medicine, please throw away (dispose) as directed by your pharmacist. The [place] Pollution Control Agency has more information about safe disposal: https://www.pca.[facility].mn.us/living-green/managing-unwanted-medications    All opioids tend to cause constipation. Drink plenty of water and eat foods that have a lot of fiber, such as fruits, vegetables, prune juice, apple juice and high-fiber cereal. Take a laxative (Miralax, milk of magnesia, Colace, Senna) if you don t move your bowels at least every other day.          Primary Care Provider Office Phone # Fax #    Edison New [phone]       [address] [place] [id number]        Equal Access to Services     CHEN PALUMBO : Hadii nidia Rebolledo, wajuanda kartik, qaybta juliano russell, wing wang. So [facility] [phone].    ATENCIÓN: Si habla español, tiene a howe disposición servicios gratuitos de asistencia lingüística. Rock al [phone].    We comply with applicable federal civil rights laws and [place] laws. We do not discriminate on the basis of race, color, national origin, age, disability, sex, sexual orientation, or gender identity.            Thank you!     Thank you for choosing [facility]  for your care. Our goal is always to provide you with excellent care. Hearing back from our patients is one way we can continue to improve our services. Please take a few minutes to complete the written survey that you may  receive in the mail after your visit with us. Thank you!             Your Updated Medication List - Protect others around you: Learn how to safely use, store and throw away your medicines at www.disposemymeds.org.          This list is accurate as of 5/10/18  1:19 PM.  Always use your most recent med list.                   Brand Name Dispense Instructions for use Diagnosis    aspirin 81 MG tablet      Take by mouth daily    Type II or unspecified type diabetes mellitus without mention of complication, uncontrolled       BASAGLAR 100 UNIT/ML injection     30 mL    Inject 49 Units Subcutaneous At Bedtime    Type 2 diabetes mellitus with hyperglycemia, with long-term current use of insulin (H)       blood glucose monitoring lancets     100 each    Use to test blood sugar 3 times daily or as directed.  Ok to substitute alternative if insurance prefers.    Type 2 diabetes mellitus with hyperglycemia, with long-term current use of insulin (H)       * blood glucose monitoring test strip    no brand specified    100 strip    Use to test blood sugars 4 times daily or as directed please provide per formulary approval patient states Verio.    Poorly controlled type II diabetes mellitus with renal complication (H), Morbid obesity due to excess calories (H)       * blood glucose monitoring test strip    ONETOUCH VERIO IQ    100 each    Use to test blood sugars 3 times daily or as directed.    Type 2 diabetes mellitus with hyperglycemia, with long-term current use of insulin (H)       insulin pen needle 32G X 4 MM     150 each    Use 4 pen needles daily or as directed.    Type 2 diabetes mellitus with hyperglycemia, with long-term current use of insulin (H)       metFORMIN 1000 MG tablet    GLUCOPHAGE    180 tablet    Take 1 tablet (1,000 mg) by mouth 2 times daily (with meals)    Poorly controlled type 2 diabetes mellitus (H)       morphine 15 MG 12 hr tablet    MS CONTIN    60 tablet    Take 1 tablet (15 mg) by mouth every 12  hours maximum 2 tablet(s) per day    DDD (degenerative disc disease), cervical, Primary localized osteoarthrosis of lower leg, unspecified laterality       NovoLOG FLEXPEN 100 UNIT/ML injection   Generic drug:  insulin aspart     15 mL    SEE NOTES    Type 2 diabetes mellitus with hyperglycemia, with long-term current use of insulin (H)       * polyethylene glycol powder    MIRALAX    510 g    Take 17 g (1 capful) by mouth daily    Slow transit constipation       * polyethylene glycol powder    MIRALAX    510 g    Take 17 g (1 capful) by mouth daily    Slow transit constipation       VICTOZA PEN 18 MG/3ML soln   Generic drug:  liraglutide           * Notice:  This list has 4 medication(s) that are the same as other medications prescribed for you. Read the directions carefully, and ask your doctor or other care provider to review them with you.

## 2018-05-10 NOTE — PROGRESS NOTES
SUBJECTIVE:   Michael Colorado is a 63 year old male who presents to clinic today for the following health issues:      Diabetes Follow-up    Patient is checking blood sugars: twice daily.    Blood sugar testing frequency justification: On insulin, frequency appropriate   Results are as follows:         am - 160              postprandial after supper- varies     Diabetic concerns: None     Symptoms of hypoglycemia (low blood sugar): none     Paresthesias (numbness or burning in feet) or sores: No     Date of last diabetic eye exam: doesn't think we ever had one     BP Readings from Last 2 Encounters:   04/17/18 139/84   04/06/18 127/83     Hemoglobin A1C (%)   Date Value   04/06/2018 6.8 (H)   01/09/2018 11.1 (H)     LDL Cholesterol Calculated (mg/dL)   Date Value   01/09/2018 80   12/03/2015 71       Amount of exercise or physical activity: 6-7 days/week for an average of less than 15 minutes    Problems taking medications regularly: No    Medication side effects: none    Diet: regular (no restrictions)      Problem list and histories reviewed & adjusted, as indicated.  Additional history: as documented    Patient Active Problem List   Diagnosis     Other and unspecified disc disorder     Tinnitus     CARDIOVASCULAR SCREENING; LDL GOAL LESS THAN 130     Advanced directives, counseling/discussion     Primary localized osteoarthrosis, lower leg     Smoker     Morbid obesity with BMI of 45.0-49.9, adult (H)     Morbid obesity (H)     Chronic pain syndrome     Hepatitis C     Obstructive sleep apnea     Tobacco use disorder     Type 2 diabetes mellitus with hyperglycemia (H)     Chemical dependency (H)     Past Surgical History:   Procedure Laterality Date     LAPAROSCOPIC HERNIORRHAPHY VENTRAL N/A 3/13/2018    Procedure: LAPAROSCOPIC HERNIORRHAPHY VENTRAL;  LAPAROSCOPIC VENTRAL HERNIA REPAIR WITH MESH  ;  Surgeon: Jairo Angel MD;  [facility]     ORTHOPEDIC SURGERY Bilateral     ARTHROSCOPY - L X 1, R X 2      SURGICAL HISTORY OF -   10/03    Lt knee partial medial meniscectomy       Social History   Substance Use Topics     Smoking status: Current Every Day Smoker     Packs/day: 1.00     Years: 30.00     Types: Cigarettes     Smokeless tobacco: Never Used     Alcohol use No      Comment: quit      Family History   Problem Relation Age of Onset     DIABETES Brother          Current Outpatient Prescriptions   Medication Sig Dispense Refill     aspirin 81 MG tablet Take by mouth daily       BASAGLAR 100 UNIT/ML injection Inject 49 Units Subcutaneous At Bedtime 30 mL 1     blood glucose monitoring (NO BRAND SPECIFIED) test strip Use to test blood sugars 4 times daily or as directed please provide per formulary approval patient states Verio. 100 strip 3     blood glucose monitoring (ONE TOUCH DELICA) lancets Use to test blood sugar 3 times daily or as directed.  Ok to substitute alternative if insurance prefers. 100 each 6     blood glucose monitoring (ONETOUCH VERIO IQ) test strip Use to test blood sugars 3 times daily or as directed. 100 each 6     insulin pen needle 32G X 4 MM Use 4 pen needles daily or as directed. 150 each 3     metFORMIN (GLUCOPHAGE) 1000 MG tablet Take 1 tablet (1,000 mg) by mouth 2 times daily (with meals) 180 tablet 1     morphine (MS CONTIN) 15 MG 12 hr tablet Take 1 tablet (15 mg) by mouth every 12 hours maximum 2 tablet(s) per day 60 tablet 0     NOVOLOG FLEXPEN 100 UNIT/ML soln SEE NOTES 15 mL 0     polyethylene glycol (MIRALAX) powder Take 17 g (1 capful) by mouth daily (Patient taking differently: Take 1 capful by mouth daily as needed ) 510 g 1     polyethylene glycol (MIRALAX) powder Take 17 g (1 capful) by mouth daily 510 g 1     VICTOZA PEN 18 MG/3ML soln   1     Allergies   Allergen Reactions     No Known Drug Allergies      Recent Labs   Lab Test  04/06/18   1158  03/06/18   1430  01/09/18   1122  02/03/17   1047  11/15/16   1149   12/03/15   1021   02/05/15   0837  04/29/13   1138   A1C   "6.8*   --   11.1*  9.3*   --    < >  6.7*   < >  7.0*  6.4*   LDL   --    --   80   --    --    --   71   --   77  112   HDL   --    --   30*   --    --    --   28*   --   32*  35*   TRIG   --    --   368*   --    --    --   312*   --   287*  218*   ALT  18   --   25   --   33   --    --    --   42  50   CR  0.64*  0.72  0.58*  0.62*   --    < >  0.60*   < >  0.64*  0.60*   GFRESTIMATED  >90  >90  >90  >90  Non African American GFR Calc     --    < >  >90  Non  GFR Calc     < >  >90  Non  GFR Calc    >90   GFRESTBLACK  >90  >90  >90  >90  African American GFR Calc     --    < >  >90   GFR Calc     < >  >90   GFR Calc    >90   POTASSIUM  4.2  4.1  4.3  4.6   --    < >  3.9   < >  4.0  4.1   TSH   --    --   2.20   --    --    --    --    --    --   1.25    < > = values in this interval not displayed.      BP Readings from Last 3 Encounters:   05/10/18 132/82   04/17/18 139/84   04/06/18 127/83    Wt Readings from Last 3 Encounters:   05/10/18 (!) 362 lb (164.2 kg)   04/17/18 (!) 367 lb (166.5 kg)   04/06/18 (!) 368 lb (166.9 kg)                    Reviewed and updated as needed this visit by clinical staff       Reviewed and updated as needed this visit by Provider         ROS:  Constitutional, HEENT, cardiovascular, pulmonary, gi and gu systems are negative, except as otherwise noted.    OBJECTIVE:     /82 (Cuff Size: Adult Large)  Pulse 87  Temp 97.9  F (36.6  C) (Tympanic)  Resp 16  Ht 6' 7.5\" (2.019 m)  Wt (!) 362 lb (164.2 kg)  SpO2 98%  BMI 40.27 kg/m2  Body mass index is 40.27 kg/(m^2).  GENERAL: healthy, alert and no distress  NECK: no adenopathy, no asymmetry, masses, or scars and thyroid normal to palpation  RESP: lungs clear to auscultation - no rales, rhonchi or wheezes  CV: regular rate and rhythm, normal S1 S2, no S3 or S4, no murmur, click or rub, no peripheral edema and peripheral pulses strong  ABDOMEN: soft, nontender, no " hepatosplenomegaly, no masses and bowel sounds normal  MS: no gross musculoskeletal defects noted, no edema        ASSESSMENT/PLAN:   ASSESSMENT / PLAN:  (E11.65,  Z79.4) Type 2 diabetes mellitus with hyperglycemia, with long-term current use of insulin (H)  (primary encounter diagnosis)  Comment: has been stable with current dose of meds, has stable a1c, will have him to see eye clinic for DM eye exam   Plan: OPHTHALMOLOGY ADULT REFERRAL            (M50.30) DDD (degenerative disc disease), cervical  Comment: stable, will have him to change to 15mg MS contin for next 3 months, and then drop the dose more   Plan: morphine (MS CONTIN) 15 MG 12 hr tablet            (M17.10) Primary localized osteoarthrosis of lower leg, unspecified laterality  Comment: has no sign of overdosing nor using other chemicals, will keep watching sx   Plan: morphine (MS CONTIN) 15 MG 12 hr tablet              FUTURE APPOINTMENTS:       - Follow-up visit in 3 months     Edison New MD  [facility]

## 2018-05-24 DIAGNOSIS — E11.65 POORLY CONTROLLED TYPE 2 DIABETES MELLITUS (H): Primary | ICD-10-CM

## 2018-05-24 DIAGNOSIS — E11.9 DIABETES MELLITUS, TYPE 2 (H): ICD-10-CM

## 2018-05-24 RX ORDER — LIRAGLUTIDE 6 MG/ML
INJECTION SUBCUTANEOUS
Qty: 6 ML | Refills: 0 | Status: SHIPPED | OUTPATIENT
Start: 2018-05-24 | End: 2018-06-25

## 2018-05-24 NOTE — TELEPHONE ENCOUNTER
Routing refill request to provider for review/approval because:  Medication is reported/historical    Dunia Ochoa RN- Triage [employer]

## 2018-05-24 NOTE — TELEPHONE ENCOUNTER
"Requested Prescriptions   Pending Prescriptions Disp Refills     VICTOZA PEN 18 MG/3ML soln [Pharmacy Med Name: VICTOZA 18MG/3ML INJ PEN 3ML]  Last Written Prescription Date:  Pt reported  Last Fill Quantity: ,  # refills:    Last office visit: 5/10/2018 with prescribing provider:  Shaq   Future Office Visit:     6 mL 0     Sig: START WITH INJECTING 0.6MG DAILY X 1 WEEK, THEN INCREASE TO 1.2MG DAILY    GLP-1 Agonists Protocol Failed    5/24/2018  2:24 PM       Failed - Normal serum creatinine on file in past 12 months    Recent Labs   Lab Test  04/06/18   1158   CR  0.64*            Passed - Blood pressure less than 140/90 in past 6 months    BP Readings from Last 3 Encounters:   05/10/18 132/82   04/17/18 139/84   04/06/18 127/83                Passed - LDL on file in past 12 months    Recent Labs   Lab Test  01/09/18   1122   LDL  80            Passed - Microalbumin on file in past 12 months    Recent Labs   Lab Test  01/09/18   1122   MICROL  6   UMALCR  7.11            Passed - HgbA1C in past 3 or 6 months    If HgbA1C is 8 or greater, it needs to be on file within the past 3 months.  If less than 8, must be on file within the past 6 months.     Recent Labs   Lab Test  04/06/18   1158   A1C  6.8*            Passed - Patient is age 18 or older       Passed - Recent (6 mo) or future (30 days) visit within the authorizing provider's specialty    Patient had office visit in the last 6 months or has a visit in the next 30 days with authorizing provider.  See \"Patient Info\" tab in inbasket, or \"Choose Columns\" in Meds & Orders section of the refill encounter.              "

## 2018-06-06 DIAGNOSIS — M17.10 PRIMARY LOCALIZED OSTEOARTHROSIS OF LOWER LEG, UNSPECIFIED LATERALITY: ICD-10-CM

## 2018-06-06 DIAGNOSIS — M50.30 DDD (DEGENERATIVE DISC DISEASE), CERVICAL: ICD-10-CM

## 2018-06-06 RX ORDER — MORPHINE SULFATE 15 MG/1
15 TABLET, FILM COATED, EXTENDED RELEASE ORAL EVERY 12 HOURS
Qty: 60 TABLET | Refills: 0 | Status: SHIPPED | OUTPATIENT
Start: 2018-06-09 | End: 2018-07-05

## 2018-06-06 NOTE — TELEPHONE ENCOUNTER
Controlled Substance Refill Request for ms contin  Problem List Complete:  No     PROVIDER TO CONSIDER COMPLETION OF PROBLEM LIST AND OVERVIEW/CONTROLLED SUBSTANCE AGREEMENT    Last Written Prescription Date:  5/10/18  Last Fill Quantity: 60,   # refills: 0    Last Office Visit with [facility] primary care provider: 5/10/18    Future Office visit:     Controlled substance agreement on file: Yes:  Date 4/6/18.     Processing:  Patient will  in clinic   checked in past 6 months?  No, route to RN     [place]  reviewed- recommend provider review - placed on PCP desk    [place]:  [email or web address]    Destiny Winslow RN   [facility] - Triage

## 2018-06-06 NOTE — TELEPHONE ENCOUNTER
Reason for Call:  Medication or medication refill:    Do you use a [facility]?  Name of the pharmacy and phone number for the current request:  Written RX to  at . Please let pt know when ready    Name of the medication requested: morphine (MS CONTIN) 15 MG 12 hr tablet    Other request: daughter Genet araujo b picking up for pt.  C2C on file    Can we leave a detailed message on this number? YES    Phone number patient can be reached at: Cell number on file:    [phone]       Best Time: any     Call taken on 6/6/2018 at 1:00 PM by Bhavna Chen

## 2018-06-25 DIAGNOSIS — K59.01 SLOW TRANSIT CONSTIPATION: ICD-10-CM

## 2018-06-25 DIAGNOSIS — Z79.4 TYPE 2 DIABETES MELLITUS WITH HYPERGLYCEMIA, WITH LONG-TERM CURRENT USE OF INSULIN (H): ICD-10-CM

## 2018-06-25 DIAGNOSIS — E11.65 POORLY CONTROLLED TYPE 2 DIABETES MELLITUS (H): ICD-10-CM

## 2018-06-25 DIAGNOSIS — E11.65 TYPE 2 DIABETES MELLITUS WITH HYPERGLYCEMIA, WITH LONG-TERM CURRENT USE OF INSULIN (H): ICD-10-CM

## 2018-06-26 NOTE — TELEPHONE ENCOUNTER
"Requested Prescriptions   Pending Prescriptions Disp Refills     VICTOZA PEN 18 MG/3ML soln [Pharmacy Med Name: VICTOZA 18MG/3ML INJ PEN 3ML] 6 mL 0    Last Written Prescription Date:  05/24/2018  Last Fill Quantity: 6 mL,  # refills: 0   Last office visit: 5/10/2018 with prescribing provider:  Edison New   Future Office Visit:     Sig: START WITH INJECTING 0.6MG DAILY X 1 WEEK, THEN INCREASE TO 1.2MG DAILY    GLP-1 Agonists Protocol Failed    6/25/2018  5:51 PM       Failed - Normal serum creatinine on file in past 12 months    Recent Labs   Lab Test  04/06/18   1158   CR  0.64*            Passed - Blood pressure less than 140/90 in past 6 months    BP Readings from Last 3 Encounters:   05/10/18 132/82   04/17/18 139/84   04/06/18 127/83                Passed - LDL on file in past 12 months    Recent Labs   Lab Test  01/09/18   1122   LDL  80            Passed - Microalbumin on file in past 12 months    Recent Labs   Lab Test  01/09/18   1122   MICROL  6   UMALCR  7.11            Passed - HgbA1C in past 3 or 6 months    If HgbA1C is 8 or greater, it needs to be on file within the past 3 months.  If less than 8, must be on file within the past 6 months.     Recent Labs   Lab Test  04/06/18   1158   A1C  6.8*            Passed - Patient is age 18 or older       Passed - Recent (6 mo) or future (30 days) visit within the authorizing provider's specialty    Patient had office visit in the last 6 months or has a visit in the next 30 days with authorizing provider.  See \"Patient Info\" tab in inbasket, or \"Choose Columns\" in Meds & Orders section of the refill encounter.            polyethylene glycol (MIRALAX/GLYCOLAX) powder [Pharmacy Med Name: POLYETH GLYCOL 3350 NF POWDER 255GM] 510 g 0    Last Written Prescription Date:  04/17/2018  Last Fill Quantity: 510 g,  # refills: 1   Last office visit: 5/10/2018 with prescribing provider:  Edison New   Future Office Visit:     Sig: MIX 17GM(1 CAPFUL) AND DRINK ONCE DAILY    " "Laxatives Protocol Passed    6/25/2018  5:51 PM       Passed - Patient is age 6 or older       Passed - Recent (12 mo) or future (30 days) visit within the authorizing provider's specialty    Patient had office visit in the last 12 months or has a visit in the next 30 days with authorizing provider or within the authorizing provider's specialty.  See \"Patient Info\" tab in inbasket, or \"Choose Columns\" in Meds & Orders section of the refill encounter.            LANTUS SOLOSTAR 100 UNIT/ML soln [Pharmacy Med Name: LANTUS SOLOSTAR PEN INJ 3ML] 15 mL 0    Last Written Prescription Date:  03/23/2018  Last Fill Quantity: 30 mL,  # refills: 1   Last office visit: 5/10/2018 with prescribing provider:  Edison New   Future Office Visit:     Sig: ADMINISTER 46 UNITS UNDER THE SKIN AT BEDTIME    Long Acting Insulin Protocol Passed    6/25/2018  5:51 PM       Passed - Blood pressure less than 140/90 in past 6 months    BP Readings from Last 3 Encounters:   05/10/18 132/82   04/17/18 139/84   04/06/18 127/83                Passed - LDL on file in past 12 months    Recent Labs   Lab Test  01/09/18   1122   LDL  80            Passed - Microalbumin on file in past 12 months    Recent Labs   Lab Test  01/09/18   1122   MICROL  6   UMALCR  7.11            Passed - Serum creatinine on file in past 12 months    Recent Labs   Lab Test  04/06/18   1158   CR  0.64*            Passed - HgbA1C in past 3 or 6 months    If HgbA1C is 8 or greater, it needs to be on file within the past 3 months.  If less than 8, must be on file within the past 6 months.     Recent Labs   Lab Test  04/06/18   1158   A1C  6.8*            Passed - Patient is age 18 or older       Passed - Recent (6 mo) or future (30 days) visit within the authorizing provider's specialty    Patient had office visit in the last 6 months or has a visit in the next 30 days with authorizing provider or within the authorizing provider's specialty.  See \"Patient Info\" tab in inbasket, " "or \"Choose Columns\" in Meds & Orders section of the refill encounter.            NOVOLOG FLEXPEN 100 UNIT/ML soln [Pharmacy Med Name: NOVOLOG FLEXPEN INJ 3ML (ORANGE)] 15 mL 0    Last Written Prescription Date:  04/30/2018  Last Fill Quantity: 15 mL,  # refills: 0   Last office visit: 5/10/2018 with prescribing provider:  Edison New   Future Office Visit:     Sig: SEE NOTES    Short Acting Insulin Protocol Passed    6/25/2018  5:51 PM       Passed - Blood pressure less than 140/90 in past 6 months    BP Readings from Last 3 Encounters:   05/10/18 132/82   04/17/18 139/84   04/06/18 127/83                Passed - LDL on file in past 12 months    Recent Labs   Lab Test  01/09/18   1122   LDL  80            Passed - Microalbumin on file in past 12 months    Recent Labs   Lab Test  01/09/18   1122   MICROL  6   UMALCR  7.11            Passed - Serum creatinine on file in past 12 months    Recent Labs   Lab Test  04/06/18   1158   CR  0.64*            Passed - HgbA1C in past 3 or 6 months    If HgbA1C is 8 or greater, it needs to be on file within the past 3 months.  If less than 8, must be on file within the past 6 months.     Recent Labs   Lab Test  04/06/18   1158   A1C  6.8*            Passed - Patient is age 18 or older       Passed - Recent (6 mo) or future (30 days) visit within the authorizing provider's specialty    Patient had office visit in the last 6 months or has a visit in the next 30 days with authorizing provider or within the authorizing provider's specialty.  See \"Patient Info\" tab in inbasket, or \"Choose Columns\" in Meds & Orders section of the refill encounter.              "

## 2018-06-27 RX ORDER — INSULIN GLARGINE 100 [IU]/ML
INJECTION, SOLUTION SUBCUTANEOUS
Qty: 15 ML | Refills: 0 | Status: SHIPPED | OUTPATIENT
Start: 2018-06-27 | End: 2018-11-14

## 2018-06-27 RX ORDER — LIRAGLUTIDE 6 MG/ML
INJECTION SUBCUTANEOUS
Qty: 6 ML | Refills: 0 | Status: SHIPPED | OUTPATIENT
Start: 2018-06-27 | End: 2018-10-05

## 2018-06-27 RX ORDER — INSULIN ASPART 100 [IU]/ML
INJECTION, SOLUTION INTRAVENOUS; SUBCUTANEOUS
Qty: 15 ML | Refills: 0 | Status: SHIPPED | OUTPATIENT
Start: 2018-06-27 | End: 2018-10-05

## 2018-06-27 RX ORDER — POLYETHYLENE GLYCOL 3350 17 G/17G
POWDER, FOR SOLUTION ORAL
Qty: 510 G | Refills: 1 | Status: SHIPPED | OUTPATIENT
Start: 2018-06-27 | End: 2018-08-08

## 2018-07-05 DIAGNOSIS — M17.10 PRIMARY LOCALIZED OSTEOARTHROSIS OF LOWER LEG, UNSPECIFIED LATERALITY: ICD-10-CM

## 2018-07-05 DIAGNOSIS — M50.30 DDD (DEGENERATIVE DISC DISEASE), CERVICAL: ICD-10-CM

## 2018-07-05 NOTE — TELEPHONE ENCOUNTER
morphine (MS CONTIN) 15 MG 12 hr tablet     Last Written Prescription Date:  6/9/18  Last Fill Quantity: 60,   # refills: 0  Last Office Visit: 5/10/18  Future Office visit:       Routing refill request to provider for review/approval because:  Drug not on the [facility], [facility] or [facility] refill protocol or controlled substance

## 2018-07-05 NOTE — TELEPHONE ENCOUNTER
Reason for Call:  Medication or medication refill:    Do you use a [facility]?  Name of the pharmacy and phone number for the current request:  [facility]    Name of the medication requested: morphine (MS CONTIN) 15 MG 12 hr tablet    Other request: na    Can we leave a detailed message on this number? YES    Phone number patient can be reached at: Home number on file [phone] (home)    Best Time: anytime,  Daughter, Genet, may be picking up,  Call when ready for pickup    Call taken on 7/5/2018 at 9:10 AM by Monika Carolina

## 2018-07-06 RX ORDER — MORPHINE SULFATE 15 MG/1
15 TABLET, FILM COATED, EXTENDED RELEASE ORAL EVERY 12 HOURS
Qty: 60 TABLET | Refills: 0 | Status: SHIPPED | OUTPATIENT
Start: 2018-07-09 | End: 2018-08-08

## 2018-07-06 NOTE — TELEPHONE ENCOUNTER
[place] ([place] reviewed:  reviewed- recommend provider review- placed on providers desk    [place] profile:  [email or web address]    Marina Carpio RN BSN  [facility]  [phone]

## 2018-08-08 ENCOUNTER — OFFICE VISIT (OUTPATIENT)
Dept: FAMILY MEDICINE | Facility: CLINIC | Age: 63
End: 2018-08-08
Payer: COMMERCIAL

## 2018-08-08 VITALS
HEART RATE: 97 BPM | WEIGHT: 315 LBS | RESPIRATION RATE: 16 BRPM | TEMPERATURE: 97.2 F | HEIGHT: 78 IN | BODY MASS INDEX: 36.45 KG/M2 | OXYGEN SATURATION: 97 % | SYSTOLIC BLOOD PRESSURE: 113 MMHG | DIASTOLIC BLOOD PRESSURE: 73 MMHG

## 2018-08-08 DIAGNOSIS — M17.10 PRIMARY LOCALIZED OSTEOARTHROSIS OF LOWER LEG, UNSPECIFIED LATERALITY: ICD-10-CM

## 2018-08-08 DIAGNOSIS — K59.01 SLOW TRANSIT CONSTIPATION: ICD-10-CM

## 2018-08-08 DIAGNOSIS — M50.30 DDD (DEGENERATIVE DISC DISEASE), CERVICAL: ICD-10-CM

## 2018-08-08 DIAGNOSIS — Z12.11 SCREEN FOR COLON CANCER: Primary | ICD-10-CM

## 2018-08-08 PROCEDURE — 99214 OFFICE O/P EST MOD 30 MIN: CPT | Performed by: FAMILY MEDICINE

## 2018-08-08 RX ORDER — MORPHINE SULFATE 15 MG/1
15 TABLET, FILM COATED, EXTENDED RELEASE ORAL EVERY 12 HOURS
Qty: 60 TABLET | Refills: 0 | Status: SHIPPED | OUTPATIENT
Start: 2018-08-08 | End: 2018-09-12

## 2018-08-08 RX ORDER — POLYETHYLENE GLYCOL 3350 17 G/17G
1 POWDER, FOR SOLUTION ORAL DAILY
Qty: 510 G | Refills: 1 | Status: SHIPPED | OUTPATIENT
Start: 2018-08-08 | End: 2018-10-05

## 2018-08-08 NOTE — MR AVS SNAPSHOT
"              After Visit Summary   8/8/2018    Michael Colorado    MRN: [id number]           Patient Information     Date Of Birth          1955        Visit Information        Provider Department      8/8/2018 9:40 AM [facility]        Today's Diagnoses     Screen for colon cancer    -  1    DDD (degenerative disc disease), cervical        Primary localized osteoarthrosis of lower leg, unspecified laterality        Slow transit constipation           Follow-ups after your visit        Follow-up notes from your care team     Return in about 1 month (around 9/8/2018) for med check.      Future tests that were ordered for you today     Open Future Orders        Priority Expected Expires Ordered    Fecal colorectal cancer screen FIT Routine 8/29/2018 10/31/2018 8/8/2018            Who to contact     If you have questions or need follow up information about today's clinic visit or your schedule please contact [facility] directly at [phone].  Normal or non-critical lab and imaging results will be communicated to you by MyChart, letter or phone within 4 business days after the clinic has received the results. If you do not hear from us within 7 days, please contact the clinic through MyChart or phone. If you have a critical or abnormal lab result, we will notify you by phone as soon as possible.  Submit refill requests through [employer] or call your pharmacy and they will forward the refill request to us. Please allow 3 business days for your refill to be completed.          Additional Information About Your Visit        Care EveryWhere ID     This is your Care EveryWhere ID. This could be used by other organizations to access your [place] medical records  [id number]        Your Vitals Were     Pulse Temperature Respirations Height Pulse Oximetry BMI (Body Mass Index)    97 97.2  F (36.2  C) (Tympanic) 16 6' 7.5\" (2.019 m) 97% 40.05 kg/m2       Blood Pressure " from Last 3 Encounters:   08/08/18 113/73   05/10/18 132/82   04/17/18 139/84    Weight from Last 3 Encounters:   08/08/18 (!) 360 lb (163.3 kg)   05/10/18 (!) 362 lb (164.2 kg)   04/17/18 (!) 367 lb (166.5 kg)                 Today's Medication Changes          These changes are accurate as of 8/8/18 10:01 AM.  If you have any questions, ask your nurse or doctor.               These medicines have changed or have updated prescriptions.        Dose/Directions    polyethylene glycol powder   Commonly known as:  MIRALAX   This may have changed:    - when to take this  - reasons to take this   Used for:  Slow transit constipation        Dose:  1 capful   Take 17 g (1 capful) by mouth daily   Quantity:  510 g   Refills:  1            Where to get your medicines      These medications were sent to [facility] [address] [place] [address] [place]  AT [facility] [address] [place] [id number] [place] , [facility] [address]     Phone:  [phone]     polyethylene glycol powder         Some of these will need a paper prescription and others can be bought over the counter.  Ask your nurse if you have questions.     Bring a paper prescription for each of these medications     morphine 15 MG 12 hr tablet               Information about OPIOIDS     PRESCRIPTION OPIOIDS: WHAT YOU NEED TO KNOW   We gave you an opioid (narcotic) pain medicine. It is important to manage your pain, but opioids are not always the best choice. You should first try all the other options your care team gave you. Take this medicine for as short a time (and as few doses) as possible.    Some activities can increase your pain, such as bandage changes or therapy sessions. It may help to take your pain medicine 30 to 60 minutes before these activities. Reduce your stress by getting enough sleep, working on hobbies you enjoy and practicing relaxation or meditation. Talk to your care team about ways to manage your pain beyond prescription  opioids.    These medicines have risks:    DO NOT drive when on new or higher doses of pain medicine. These medicines can affect your alertness and reaction times, and you could be arrested for driving under the influence (DUI). If you need to use opioids long-term, talk to your care team about driving.    DO NOT operate heavy machinery    DO NOT do any other dangerous activities while taking these medicines.    DO NOT drink any alcohol while taking these medicines.     If the opioid prescribed includes acetaminophen, DO NOT take with any other medicines that contain acetaminophen. Read all labels carefully. Look for the word  acetaminophen  or  Tylenol.  Ask your pharmacist if you have questions or are unsure.    You can get addicted to pain medicines, especially if you have a history of addiction (chemical, alcohol or substance dependence). Talk to your care team about ways to reduce this risk.    All opioids tend to cause constipation. Drink plenty of water and eat foods that have a lot of fiber, such as fruits, vegetables, prune juice, apple juice and high-fiber cereal. Take a laxative (Miralax, milk of magnesia, Colace, Senna) if you don t move your bowels at least every other day. Other side effects include upset stomach, sleepiness, dizziness, throwing up, tolerance (needing more of the medicine to have the same effect), physical dependence and slowed breathing.    Store your pills in a secure place, locked if possible. We will not replace any lost or stolen medicine. If you don t finish your medicine, please throw away (dispose) as directed by your pharmacist. The [place] Pollution Control Agency has more information about safe disposal: [email or web address]://[email or web address].[email or web address].us/living-green/[email or web address]-unwanted-medications         Primary Care Provider Office Phone # Fax #    Edison New [phone]       [address] [place] [address] DRIVE  [facility] [address]        Equal Access to Services     JESSICA PALUMBO :  Hadii nidia sahni Sorachanaali, waaxda luqadaha, qaybta kaalmada yvonne, wing merrittin hayaacheryl hendrixfuad landa lachasecheryl felipe. So [facility] [phone].    ATENCIÓN: Si habla lidia, tiene a howe disposición servicios gratuitos de asistencia lingüística. Llame al [phone].    We comply with applicable federal civil rights laws and [place] laws. We do not discriminate on the basis of race, color, national origin, age, disability, sex, sexual orientation, or gender identity.            Thank you!     Thank you for choosing [facility]  for your care. Our goal is always to provide you with excellent care. Hearing back from our patients is one way we can continue to improve our services. Please take a few minutes to complete the written survey that you may receive in the mail after your visit with us. Thank you!             Your Updated Medication List - Protect others around you: Learn how to safely use, store and throw away your medicines at www.disposemymeds.org.          This list is accurate as of 8/8/18 10:01 AM.  Always use your most recent med list.                   Brand Name Dispense Instructions for use Diagnosis    aspirin 81 MG tablet      Take by mouth daily    Type II or unspecified type diabetes mellitus without mention of complication, uncontrolled       * BASAGLAR 100 UNIT/ML injection     30 mL    Inject 49 Units Subcutaneous At Bedtime    Type 2 diabetes mellitus with hyperglycemia, with long-term current use of insulin (H)       * LANTUS SOLOSTAR 100 UNIT/ML injection   Generic drug:  insulin glargine     15 mL    ADMINISTER 46 UNITS UNDER THE SKIN AT BEDTIME    Type 2 diabetes mellitus with hyperglycemia, with long-term current use of insulin (H)       blood glucose monitoring lancets     100 each    Use to test blood sugar 3 times daily or as directed.  Ok to substitute alternative if insurance prefers.    Type 2 diabetes mellitus with hyperglycemia, with long-term current use of  insulin (H)       * blood glucose monitoring test strip    no brand specified    100 strip    Use to test blood sugars 4 times daily or as directed please provide per formulary approval patient states Verio.    Poorly controlled type II diabetes mellitus with renal complication (H), Morbid obesity due to excess calories (H)       * blood glucose monitoring test strip    ONETOUCH VERIO IQ    100 each    Use to test blood sugars 3 times daily or as directed.    Type 2 diabetes mellitus with hyperglycemia, with long-term current use of insulin (H)       insulin pen needle 32G X 4 MM     150 each    Use 4 pen needles daily or as directed.    Type 2 diabetes mellitus with hyperglycemia, with long-term current use of insulin (H)       metFORMIN 1000 MG tablet    GLUCOPHAGE    180 tablet    Take 1 tablet (1,000 mg) by mouth 2 times daily (with meals)    Poorly controlled type 2 diabetes mellitus (H)       morphine 15 MG 12 hr tablet    MS CONTIN    60 tablet    Take 1 tablet (15 mg) by mouth every 12 hours maximum 2 tablet(s) per day    DDD (degenerative disc disease), cervical, Primary localized osteoarthrosis of lower leg, unspecified laterality       NovoLOG FLEXPEN 100 UNIT/ML injection   Generic drug:  insulin aspart     15 mL    SEE NOTES    Type 2 diabetes mellitus with hyperglycemia, with long-term current use of insulin (H)       polyethylene glycol powder    MIRALAX    510 g    Take 17 g (1 capful) by mouth daily    Slow transit constipation       VICTOZA PEN 18 MG/3ML soln   Generic drug:  liraglutide     6 mL    START WITH INJECTING 0.6MG DAILY X 1 WEEK, THEN INCREASE TO 1.2MG DAILY    Poorly controlled type 2 diabetes mellitus (H)       * Notice:  This list has 4 medication(s) that are the same as other medications prescribed for you. Read the directions carefully, and ask your doctor or other care provider to review them with you.

## 2018-08-08 NOTE — PROGRESS NOTES
SUBJECTIVE:   Michael Colorado is a 63 year old male who presents to clinic today for the following health issues:      Diabetes Follow-up    Patient is checking blood sugars: once daily.  Results are as follows:              postprandial after supper- 100-170    Diabetic concerns: None     Symptoms of hypoglycemia (low blood sugar): none     Paresthesias (numbness or burning in feet) or sores: No     Date of last diabetic eye exam: 5-8 years ago     BP Readings from Last 2 Encounters:   05/10/18 132/82   04/17/18 139/84     Hemoglobin A1C (%)   Date Value   04/06/2018 6.8 (H)   01/09/2018 11.1 (H)     LDL Cholesterol Calculated (mg/dL)   Date Value   01/09/2018 80   12/03/2015 71       Diabetes Management Resources    Amount of exercise or physical activity: None    Problems taking medications regularly: No    Medication side effects: none    Diet: regular (no restrictions)      Problem list and histories reviewed & adjusted, as indicated.  Additional history: as documented    Patient Active Problem List   Diagnosis     Other and unspecified disc disorder     Tinnitus     CARDIOVASCULAR SCREENING; LDL GOAL LESS THAN 130     Advanced directives, counseling/discussion     Primary localized osteoarthrosis, lower leg     Smoker     Morbid obesity with BMI of 45.0-49.9, adult (H)     Morbid obesity (H)     Chronic pain syndrome     Hepatitis C     Obstructive sleep apnea     Tobacco use disorder     Type 2 diabetes mellitus with hyperglycemia (H)     Chemical dependency (H)     Past Surgical History:   Procedure Laterality Date     LAPAROSCOPIC HERNIORRHAPHY VENTRAL N/A 3/13/2018    Procedure: LAPAROSCOPIC HERNIORRHAPHY VENTRAL;  LAPAROSCOPIC VENTRAL HERNIA REPAIR WITH MESH  ;  Surgeon: Jairo Angel MD;  Location: [facility]     ORTHOPEDIC SURGERY Bilateral     ARTHROSCOPY - L X 1, R X 2     SURGICAL HISTORY OF -   10/03    Lt knee partial medial meniscectomy       Social History   Substance Use Topics     Smoking status:  Current Every Day Smoker     Packs/day: 1.00     Years: 30.00     Types: Cigarettes     Smokeless tobacco: Never Used     Alcohol use No      Comment: quit      Family History   Problem Relation Age of Onset     Diabetes Brother          Current Outpatient Prescriptions   Medication Sig Dispense Refill     aspirin 81 MG tablet Take by mouth daily       BASAGLAR 100 UNIT/ML injection Inject 49 Units Subcutaneous At Bedtime 30 mL 1     blood glucose monitoring (ONE TOUCH DELICA) lancets Use to test blood sugar 3 times daily or as directed.  Ok to substitute alternative if insurance prefers. 100 each 6     blood glucose monitoring (ONETOUCH VERIO IQ) test strip Use to test blood sugars 3 times daily or as directed. 100 each 6     insulin pen needle 32G X 4 MM Use 4 pen needles daily or as directed. 150 each 3     LANTUS SOLOSTAR 100 UNIT/ML soln ADMINISTER 46 UNITS UNDER THE SKIN AT BEDTIME 15 mL 0     metFORMIN (GLUCOPHAGE) 1000 MG tablet Take 1 tablet (1,000 mg) by mouth 2 times daily (with meals) 180 tablet 1     morphine (MS CONTIN) 15 MG 12 hr tablet Take 1 tablet (15 mg) by mouth every 12 hours maximum 2 tablet(s) per day 60 tablet 0     NOVOLOG FLEXPEN 100 UNIT/ML soln SEE NOTES 15 mL 0     polyethylene glycol (MIRALAX) powder Take 17 g (1 capful) by mouth daily 510 g 1     VICTOZA PEN 18 MG/3ML soln START WITH INJECTING 0.6MG DAILY X 1 WEEK, THEN INCREASE TO 1.2MG DAILY 6 mL 0     blood glucose monitoring (NO BRAND SPECIFIED) test strip Use to test blood sugars 4 times daily or as directed please provide per formulary approval patient states Verio. (Patient not taking: Reported on 8/8/2018) 100 strip 3     [DISCONTINUED] VICTOZA PEN 18 MG/3ML soln   1     Allergies   Allergen Reactions     No Known Drug Allergies      Recent Labs   Lab Test  04/06/18   1158  03/06/18   1430  01/09/18   1122  02/03/17   1047  11/15/16   1149   12/03/15   1021   02/05/15   0837  04/29/13   1138   A1C  6.8*   --   11.1*  9.3*   --  "   < >  6.7*   < >  7.0*  6.4*   LDL   --    --   80   --    --    --   71   --   77  112   HDL   --    --   30*   --    --    --   28*   --   32*  35*   TRIG   --    --   368*   --    --    --   312*   --   287*  218*   ALT  18   --   25   --   33   --    --    --   42  50   CR  0.64*  0.72  0.58*  0.62*   --    < >  0.60*   < >  0.64*  0.60*   GFRESTIMATED  >90  >90  >90  >90  Non African American GFR Calc     --    < >  >90  Non  GFR Calc     < >  >90  Non  GFR Calc    >90   GFRESTBLACK  >90  >90  >90  >90  African American GFR Calc     --    < >  >90   GFR Calc     < >  >90   GFR Calc    >90   POTASSIUM  4.2  4.1  4.3  4.6   --    < >  3.9   < >  4.0  4.1   TSH   --    --   2.20   --    --    --    --    --    --   1.25    < > = values in this interval not displayed.      BP Readings from Last 3 Encounters:   08/08/18 113/73   05/10/18 132/82   04/17/18 139/84    Wt Readings from Last 3 Encounters:   08/08/18 (!) 360 lb (163.3 kg)   05/10/18 (!) 362 lb (164.2 kg)   04/17/18 (!) 367 lb (166.5 kg)                    Reviewed and updated as needed this visit by clinical staff       Reviewed and updated as needed this visit by Provider         ROS:  Constitutional, HEENT, cardiovascular, pulmonary, gi and gu systems are negative, except as otherwise noted.    OBJECTIVE:     /73 (Cuff Size: Adult Large)  Pulse 97  Temp 97.2  F (36.2  C) (Tympanic)  Resp 16  Ht 6' 7.5\" (2.019 m)  Wt (!) 360 lb (163.3 kg)  SpO2 97%  BMI 40.05 kg/m2  Body mass index is 40.05 kg/(m^2).  GENERAL: healthy, alert and no distress  NECK: no adenopathy, no asymmetry, masses, or scars and thyroid normal to palpation  RESP: lungs clear to auscultation - no rales, rhonchi or wheezes  CV: regular rate and rhythm, normal S1 S2, no S3 or S4, no murmur, click or rub, no peripheral edema and peripheral pulses strong  ABDOMEN: soft, nontender, no hepatosplenomegaly, no masses " and bowel sounds normal  MS: no gross musculoskeletal defects noted, no edema        ASSESSMENT/PLAN:   ASSESSMENT / PLAN:  (Z12.11) Screen for colon cancer  (primary encounter diagnosis)  Plan: Fecal colorectal cancer screen FIT            (M50.30) DDD (degenerative disc disease), cervical  Comment: has been stable with current dose of meds, has no side effect from the meds, will keep watching sx   Plan: morphine (MS CONTIN) 15 MG 12 hr tablet            (M17.10) Primary localized osteoarthrosis of lower leg, unspecified laterality  Comment: stable, will consider tapering dose of MS contin next time   Plan: morphine (MS CONTIN) 15 MG 12 hr tablet            (K59.01) Slow transit constipation  Comment: worsening without med, will keep watching sx   Plan: polyethylene glycol (MIRALAX) powder              FUTURE APPOINTMENTS:       - Follow-up visit in 1 month     Edison New MD  [facility]

## 2018-09-04 DIAGNOSIS — M50.30 DDD (DEGENERATIVE DISC DISEASE), CERVICAL: ICD-10-CM

## 2018-09-04 DIAGNOSIS — M17.10 PRIMARY LOCALIZED OSTEOARTHROSIS OF LOWER LEG, UNSPECIFIED LATERALITY: ICD-10-CM

## 2018-09-04 RX ORDER — MORPHINE SULFATE 15 MG/1
15 TABLET, FILM COATED, EXTENDED RELEASE ORAL EVERY 12 HOURS
Qty: 60 TABLET | Refills: 0 | Status: CANCELLED | OUTPATIENT
Start: 2018-09-04

## 2018-09-04 NOTE — TELEPHONE ENCOUNTER
morphine (MS CONTIN) 15 MG 12 hr tablet    Last Written Prescription Date:  8/8/18  Last Fill Quantity: 60,   # refills: 0  Last Office Visit: 8/8/18  Future Office visit:       Routing refill request to provider for review/approval because:  Drug not on the [facility], [facility] or [facility] refill protocol or controlled substance

## 2018-09-04 NOTE — TELEPHONE ENCOUNTER
Reason for Call:  Medication or medication refill:    Do you use a [facility]?  Name of the pharmacy and phone number for the current request:  [facility]    Name of the medication requested: morphine    Other request:     Can we leave a detailed message on this number? YES    Phone number patient can be reached at: Home number on file [phone] (home)    Best Time:     Call taken on 9/4/2018 at 1:26 PM by Sarika Razo

## 2018-09-05 NOTE — TELEPHONE ENCOUNTER
RX [place] ([place] reviewed:  not reviewed/not due - last done on 7/6/18    [place] profile:  [email or web address]    Destiny Winslow RN   [facility]

## 2018-09-05 NOTE — TELEPHONE ENCOUNTER
Routing to team to inform and assist in scheduling.   Destiny Winslow RN   [facility] [place] - Triage

## 2018-09-12 ENCOUNTER — OFFICE VISIT (OUTPATIENT)
Dept: FAMILY MEDICINE | Facility: CLINIC | Age: 63
End: 2018-09-12
Payer: COMMERCIAL

## 2018-09-12 VITALS
HEART RATE: 82 BPM | RESPIRATION RATE: 16 BRPM | BODY MASS INDEX: 36.45 KG/M2 | WEIGHT: 315 LBS | DIASTOLIC BLOOD PRESSURE: 85 MMHG | HEIGHT: 78 IN | TEMPERATURE: 98 F | OXYGEN SATURATION: 96 % | SYSTOLIC BLOOD PRESSURE: 136 MMHG

## 2018-09-12 DIAGNOSIS — B19.20 HEPATITIS C VIRUS INFECTION WITHOUT HEPATIC COMA, UNSPECIFIED CHRONICITY: ICD-10-CM

## 2018-09-12 DIAGNOSIS — M17.10 PRIMARY LOCALIZED OSTEOARTHROSIS OF LOWER LEG, UNSPECIFIED LATERALITY: ICD-10-CM

## 2018-09-12 DIAGNOSIS — F19.20 CHEMICAL DEPENDENCY (H): ICD-10-CM

## 2018-09-12 DIAGNOSIS — E11.65 TYPE 2 DIABETES MELLITUS WITH HYPERGLYCEMIA, WITH LONG-TERM CURRENT USE OF INSULIN (H): ICD-10-CM

## 2018-09-12 DIAGNOSIS — E66.01 MORBID OBESITY WITH BMI OF 45.0-49.9, ADULT (H): Primary | ICD-10-CM

## 2018-09-12 DIAGNOSIS — M50.30 DDD (DEGENERATIVE DISC DISEASE), CERVICAL: ICD-10-CM

## 2018-09-12 DIAGNOSIS — Z79.4 TYPE 2 DIABETES MELLITUS WITH HYPERGLYCEMIA, WITH LONG-TERM CURRENT USE OF INSULIN (H): ICD-10-CM

## 2018-09-12 LAB — HBA1C MFR BLD: 7.7 % (ref 0–5.6)

## 2018-09-12 PROCEDURE — 99214 OFFICE O/P EST MOD 30 MIN: CPT | Performed by: FAMILY MEDICINE

## 2018-09-12 PROCEDURE — 83721 ASSAY OF BLOOD LIPOPROTEIN: CPT | Performed by: FAMILY MEDICINE

## 2018-09-12 PROCEDURE — 80053 COMPREHEN METABOLIC PANEL: CPT | Performed by: FAMILY MEDICINE

## 2018-09-12 PROCEDURE — 83036 HEMOGLOBIN GLYCOSYLATED A1C: CPT | Performed by: FAMILY MEDICINE

## 2018-09-12 PROCEDURE — 36415 COLL VENOUS BLD VENIPUNCTURE: CPT | Performed by: FAMILY MEDICINE

## 2018-09-12 RX ORDER — MORPHINE SULFATE 15 MG/1
15 TABLET ORAL 2 TIMES DAILY
Qty: 60 TABLET | Refills: 0 | Status: SHIPPED | OUTPATIENT
Start: 2018-09-12 | End: 2018-10-16

## 2018-09-12 NOTE — PROGRESS NOTES
SUBJECTIVE:   Michael Colorado is a 63 year old male who presents to clinic today for the following health issues:      Medication Followup of Morphine     Taking Medication as prescribed: yes    Side Effects:  None    Medication Helping Symptoms:  yes     Problem list and histories reviewed & adjusted, as indicated.  Additional history: as documented    Patient Active Problem List   Diagnosis     Other and unspecified disc disorder     Tinnitus     CARDIOVASCULAR SCREENING; LDL GOAL LESS THAN 130     Advanced directives, counseling/discussion     Primary localized osteoarthrosis, lower leg     Smoker     Morbid obesity with BMI of 45.0-49.9, adult (H)     Morbid obesity (H)     Chronic pain syndrome     Hepatitis C     Obstructive sleep apnea     Tobacco use disorder     Type 2 diabetes mellitus with hyperglycemia (H)     Chemical dependency (H)     Past Surgical History:   Procedure Laterality Date     LAPAROSCOPIC HERNIORRHAPHY VENTRAL N/A 3/13/2018    Procedure: LAPAROSCOPIC HERNIORRHAPHY VENTRAL;  LAPAROSCOPIC VENTRAL HERNIA REPAIR WITH MESH  ;  Surgeon: Jairo Angel MD;  Location: [facility]     ORTHOPEDIC SURGERY Bilateral     ARTHROSCOPY - L X 1, R X 2     SURGICAL HISTORY OF -   10/03    Lt knee partial medial meniscectomy       Social History   Substance Use Topics     Smoking status: Current Every Day Smoker     Packs/day: 1.00     Years: 30.00     Types: Cigarettes     Smokeless tobacco: Never Used     Alcohol use No      Comment: quit      Family History   Problem Relation Age of Onset     Diabetes Brother          Current Outpatient Prescriptions   Medication Sig Dispense Refill     aspirin 81 MG tablet Take by mouth daily       BASAGLAR 100 UNIT/ML injection Inject 49 Units Subcutaneous At Bedtime 30 mL 1     blood glucose monitoring (ONE TOUCH DELICA) lancets Use to test blood sugar 3 times daily or as directed.  Ok to substitute alternative if insurance prefers. 100 each 6     blood glucose monitoring  (ONETOUCH VERIO IQ) test strip Use to test blood sugars 3 times daily or as directed. 100 each 6     insulin pen needle 32G X 4 MM Use 4 pen needles daily or as directed. 150 each 3     LANTUS SOLOSTAR 100 UNIT/ML soln ADMINISTER 46 UNITS UNDER THE SKIN AT BEDTIME 15 mL 0     metFORMIN (GLUCOPHAGE) 1000 MG tablet Take 1 tablet (1,000 mg) by mouth 2 times daily (with meals) 180 tablet 1     morphine (MSIR) 15 MG IR tablet Take 1 tablet (15 mg) by mouth 2 times daily 60 tablet 0     NOVOLOG FLEXPEN 100 UNIT/ML soln SEE NOTES 15 mL 0     polyethylene glycol (MIRALAX) powder Take 17 g (1 capful) by mouth daily 510 g 1     VICTOZA PEN 18 MG/3ML soln START WITH INJECTING 0.6MG DAILY X 1 WEEK, THEN INCREASE TO 1.2MG DAILY 6 mL 0     blood glucose monitoring (NO BRAND SPECIFIED) test strip Use to test blood sugars 4 times daily or as directed please provide per formulary approval patient states Verio. (Patient not taking: Reported on 8/8/2018) 100 strip 3     Allergies   Allergen Reactions     No Known Drug Allergies      Recent Labs   Lab Test  04/06/18   1158  03/06/18   1430  01/09/18   1122  02/03/17   1047  11/15/16   1149   12/03/15   1021   02/05/15   0837  04/29/13   1138   A1C  6.8*   --   11.1*  9.3*   --    < >  6.7*   < >  7.0*  6.4*   LDL   --    --   80   --    --    --   71   --   77  112   HDL   --    --   30*   --    --    --   28*   --   32*  35*   TRIG   --    --   368*   --    --    --   312*   --   287*  218*   ALT  18   --   25   --   33   --    --    --   42  50   CR  0.64*  0.72  0.58*  0.62*   --    < >  0.60*   < >  0.64*  0.60*   GFRESTIMATED  >90  >90  >90  >90  Non African American GFR Calc     --    < >  >90  Non  GFR Calc     < >  >90  Non  GFR Calc    >90   GFRESTBLACK  >90  >90  >90  >90  African American GFR Calc     --    < >  >90   GFR Calc     < >  >90   GFR Calc    >90   POTASSIUM  4.2  4.1  4.3  4.6   --    < >  3.9   < >   "4.0  4.1   TSH   --    --   2.20   --    --    --    --    --    --   1.25    < > = values in this interval not displayed.      BP Readings from Last 3 Encounters:   09/12/18 136/85   08/08/18 113/73   05/10/18 132/82    Wt Readings from Last 3 Encounters:   09/12/18 (!) 367 lb (166.5 kg)   08/08/18 (!) 360 lb (163.3 kg)   05/10/18 (!) 362 lb (164.2 kg)                    Reviewed and updated as needed this visit by clinical staff       Reviewed and updated as needed this visit by Provider         ROS:  Constitutional, HEENT, cardiovascular, pulmonary, gi and gu systems are negative, except as otherwise noted.    OBJECTIVE:     /85 (Cuff Size: Adult Large)  Pulse 82  Temp 98  F (36.7  C) (Tympanic)  Resp 16  Ht 6' 7.5\" (2.019 m)  Wt (!) 367 lb (166.5 kg)  SpO2 96%  BMI 40.83 kg/m2  Body mass index is 40.83 kg/(m^2).  GENERAL: healthy, alert and no distress  EYES: Eyes grossly normal to inspection, PERRL and conjunctivae and sclerae normal  HENT: ear canals and TM's normal, nose and mouth without ulcers or lesions  NECK: no adenopathy, no asymmetry, masses, or scars and thyroid normal to palpation  RESP: lungs clear to auscultation - no rales, rhonchi or wheezes  CV: regular rate and rhythm, normal S1 S2, no S3 or S4, no murmur, click or rub, no peripheral edema and peripheral pulses strong  ABDOMEN: soft, nontender, no hepatosplenomegaly, no masses and bowel sounds normal  MS: no gross musculoskeletal defects noted, no edema  SKIN: no suspicious lesions or rashes  NEURO: Normal strength and tone, mentation intact and speech normal  BACK: no CVA tenderness, no paralumbar tenderness  PSYCH: mentation appears normal, affect normal/bright  LYMPH: no cervical, supraclavicular, axillary, or inguinal adenopathy        ASSESSMENT/PLAN:   ASSESSMENT / PLAN:  (E66.01,  Z68.42) Morbid obesity with BMI of 45.0-49.9, adult (H)  (primary encounter diagnosis)  Comment: has not been changing,will have him to recheck " lab for further evaluation, encouraged him to keep working on life style modification   Plan: Hemoglobin A1c, LDL cholesterol direct,         Comprehensive metabolic panel            (B19.20) Hepatitis C virus infection without hepatic coma, unspecified chronicity  Comment: has been stable   Plan: Comprehensive metabolic panel        Will recheck lab for further evaluation     (E11.65,  Z79.4) Type 2 diabetes mellitus with hyperglycemia, with long-term current use of insulin (H)  Comment: has been fluctuating,will recheck lab for further evaluation   Plan: Hemoglobin A1c, LDL cholesterol direct,         Comprehensive metabolic panel            (F19.20) Chemical dependency (H)  Comment: has been stable, will have him to adjust the dose of morphine from MS contin 15mg bid to MSIR 15mg bid for next 1 month   Plan: Hemoglobin A1c, LDL cholesterol direct,         Comprehensive metabolic panel, morphine (MSIR)         15 MG IR tablet        Will recheck next month and consider dropping dose down more to 7.5mg tid if indicated     (M50.30) DDD (degenerative disc disease), cervical  Comment: stable   Plan: mentioned above     (M17.10) Primary localized osteoarthrosis of lower leg, unspecified laterality  Comment: mentioned above   Plan: morphine (MSIR) 15 MG IR tablet            FUTURE APPOINTMENTS:       - Follow-up visit in 1 month     Edison New MD  [facility] [place]

## 2018-09-12 NOTE — MR AVS SNAPSHOT
"              After Visit Summary   9/12/2018    Michael Colorado    MRN: [id number]           Patient Information     Date Of Birth          1955        Visit Information        Provider Department      9/12/2018 11:40 AM Edison New MD [facility]        Today's Diagnoses     Morbid obesity with BMI of 45.0-49.9, adult (H)    -  1    Hepatitis C virus infection without hepatic coma, unspecified chronicity        Type 2 diabetes mellitus with hyperglycemia, with long-term current use of insulin (H)        Chemical dependency (H)        DDD (degenerative disc disease), cervical        Primary localized osteoarthrosis of lower leg, unspecified laterality           Follow-ups after your visit        Follow-up notes from your care team     Return in about 1 month (around 10/12/2018) for med check.      Who to contact     If you have questions or need follow up information about today's clinic visit or your schedule please contact [facility] directly at [phone].  Normal or non-critical lab and imaging results will be communicated to you by MyChart, letter or phone within 4 business days after the clinic has received the results. If you do not hear from us within 7 days, please contact the clinic through [facility] or phone. If you have a critical or abnormal lab result, we will notify you by phone as soon as possible.  Submit refill requests through Diana or call your pharmacy and they will forward the refill request to us. Please allow 3 business days for your refill to be completed.          Additional Information About Your Visit        Care EveryWhere ID     This is your Care EveryWhere ID. This could be used by other organizations to access your [facility] medical records  [id number]        Your Vitals Were     Pulse Temperature Respirations Height Pulse Oximetry BMI (Body Mass Index)    82 98  F (36.7  C) (Tympanic) 16 6' 7.5\" (2.019 m) 96% 40.83 kg/m2       Blood " Pressure from Last 3 Encounters:   09/12/18 136/85   08/08/18 113/73   05/10/18 132/82    Weight from Last 3 Encounters:   09/12/18 (!) 367 lb (166.5 kg)   08/08/18 (!) 360 lb (163.3 kg)   05/10/18 (!) 362 lb (164.2 kg)              We Performed the Following     Comprehensive metabolic panel     Hemoglobin A1c     LDL cholesterol direct          Today's Medication Changes          These changes are accurate as of 9/12/18 12:04 PM.  If you have any questions, ask your nurse or doctor.               Start taking these medicines.        Dose/Directions    morphine 15 MG IR tablet   Commonly known as:  MSIR   Used for:  Chemical dependency (H), Primary localized osteoarthrosis of lower leg, unspecified laterality   Replaces:  morphine 15 MG 12 hr tablet   Started by:  Edison New MD        Dose:  15 mg   Take 1 tablet (15 mg) by mouth 2 times daily   Quantity:  60 tablet   Refills:  0         Stop taking these medicines if you haven't already. Please contact your care team if you have questions.     morphine 15 MG 12 hr tablet   Commonly known as:  MS CONTIN   Replaced by:  morphine 15 MG IR tablet   Stopped by:  Edison New MD                Where to get your medicines      Some of these will need a paper prescription and others can be bought over the counter.  Ask your nurse if you have questions.     Bring a paper prescription for each of these medications     morphine 15 MG IR tablet               Information about OPIOIDS     PRESCRIPTION OPIOIDS: WHAT YOU NEED TO KNOW   We gave you an opioid (narcotic) pain medicine. It is important to manage your pain, but opioids are not always the best choice. You should first try all the other options your care team gave you. Take this medicine for as short a time (and as few doses) as possible.    Some activities can increase your pain, such as bandage changes or therapy sessions. It may help to take your pain medicine 30 to 60 minutes before these activities. Reduce your  stress by getting enough sleep, working on hobbies you enjoy and practicing relaxation or meditation. Talk to your care team about ways to manage your pain beyond prescription opioids.    These medicines have risks:    DO NOT drive when on new or higher doses of pain medicine. These medicines can affect your alertness and reaction times, and you could be arrested for driving under the influence (DUI). If you need to use opioids long-term, talk to your care team about driving.    DO NOT operate heavy machinery    DO NOT do any other dangerous activities while taking these medicines.    DO NOT drink any alcohol while taking these medicines.     If the opioid prescribed includes acetaminophen, DO NOT take with any other medicines that contain acetaminophen. Read all labels carefully. Look for the word  acetaminophen  or  Tylenol.  Ask your pharmacist if you have questions or are unsure.    You can get addicted to pain medicines, especially if you have a history of addiction (chemical, alcohol or substance dependence). Talk to your care team about ways to reduce this risk.    All opioids tend to cause constipation. Drink plenty of water and eat foods that have a lot of fiber, such as fruits, vegetables, prune juice, apple juice and high-fiber cereal. Take a laxative (Miralax, milk of magnesia, Colace, Senna) if you don t move your bowels at least every other day. Other side effects include upset stomach, sleepiness, dizziness, throwing up, tolerance (needing more of the medicine to have the same effect), physical dependence and slowed breathing.    Store your pills in a secure place, locked if possible. We will not replace any lost or stolen medicine. If you don t finish your medicine, please throw away (dispose) as directed by your pharmacist. The [place] Pollution Control Agency has more information about safe disposal: [email or web address].[email or web address]-medications         Primary Care  Provider Office Phone # Fax #    Edison JULIO New [phone]       [address] [facility] [address]        Equal Access to Services     JESSICA PALUMBO : Hadii aad ku hadshereenlily Janeljerome, waaxda luqadaha, qaybta kaalmada adejian, wing wheeler simeonfuad patinosean wang. So [facility] [phone].    ATENCIÓN: Si habla español, tiene a howe disposición servicios gratuitos de asistencia lingüística. Llame al [phone].    We comply with applicable federal civil rights laws and [place] laws. We do not discriminate on the basis of race, color, national origin, age, disability, sex, sexual orientation, or gender identity.            Thank you!     Thank you for choosing [facility] [place]  for your care. Our goal is always to provide you with excellent care. Hearing back from our patients is one way we can continue to improve our services. Please take a few minutes to complete the written survey that you may receive in the mail after your visit with us. Thank you!             Your Updated Medication List - Protect others around you: Learn how to safely use, store and throw away your medicines at www.disposemymeds.org.          This list is accurate as of 9/12/18 12:04 PM.  Always use your most recent med list.                   Brand Name Dispense Instructions for use Diagnosis    aspirin 81 MG tablet      Take by mouth daily    Type II or unspecified type diabetes mellitus without mention of complication, uncontrolled       * BASAGLAR 100 UNIT/ML injection     30 mL    Inject 49 Units Subcutaneous At Bedtime    Type 2 diabetes mellitus with hyperglycemia, with long-term current use of insulin (H)       * LANTUS SOLOSTAR 100 UNIT/ML injection   Generic drug:  insulin glargine     15 mL    ADMINISTER 46 UNITS UNDER THE SKIN AT BEDTIME    Type 2 diabetes mellitus with hyperglycemia, with long-term current use of insulin (H)       blood glucose monitoring lancets     100 each    Use to  test blood sugar 3 times daily or as directed.  Ok to substitute alternative if insurance prefers.    Type 2 diabetes mellitus with hyperglycemia, with long-term current use of insulin (H)       * blood glucose monitoring test strip    no brand specified    100 strip    Use to test blood sugars 4 times daily or as directed please provide per formulary approval patient states Verio.    Poorly controlled type II diabetes mellitus with renal complication (H), Morbid obesity due to excess calories (H)       * blood glucose monitoring test strip    ONETOUCH VERIO IQ    100 each    Use to test blood sugars 3 times daily or as directed.    Type 2 diabetes mellitus with hyperglycemia, with long-term current use of insulin (H)       insulin pen needle 32G X 4 MM     150 each    Use 4 pen needles daily or as directed.    Type 2 diabetes mellitus with hyperglycemia, with long-term current use of insulin (H)       metFORMIN 1000 MG tablet    GLUCOPHAGE    180 tablet    Take 1 tablet (1,000 mg) by mouth 2 times daily (with meals)    Poorly controlled type 2 diabetes mellitus (H)       morphine 15 MG IR tablet    MSIR    60 tablet    Take 1 tablet (15 mg) by mouth 2 times daily    Chemical dependency (H), Primary localized osteoarthrosis of lower leg, unspecified laterality       NovoLOG FLEXPEN 100 UNIT/ML injection   Generic drug:  insulin aspart     15 mL    SEE NOTES    Type 2 diabetes mellitus with hyperglycemia, with long-term current use of insulin (H)       polyethylene glycol powder    MIRALAX    510 g    Take 17 g (1 capful) by mouth daily    Slow transit constipation       VICTOZA PEN 18 MG/3ML soln   Generic drug:  liraglutide     6 mL    START WITH INJECTING 0.6MG DAILY X 1 WEEK, THEN INCREASE TO 1.2MG DAILY    Poorly controlled type 2 diabetes mellitus (H)       * Notice:  This list has 4 medication(s) that are the same as other medications prescribed for you. Read the directions carefully, and ask your doctor or  other care provider to review them with you.

## 2018-09-13 LAB
ALBUMIN SERPL-MCNC: 4 G/DL (ref 3.4–5)
ALP SERPL-CCNC: 90 U/L (ref 40–150)
ALT SERPL W P-5'-P-CCNC: 30 U/L (ref 0–70)
ANION GAP SERPL CALCULATED.3IONS-SCNC: 5 MMOL/L (ref 3–14)
AST SERPL W P-5'-P-CCNC: 20 U/L (ref 0–45)
BILIRUB SERPL-MCNC: 0.5 MG/DL (ref 0.2–1.3)
BUN SERPL-MCNC: 13 MG/DL (ref 7–30)
CALCIUM SERPL-MCNC: 8.6 MG/DL (ref 8.5–10.1)
CHLORIDE SERPL-SCNC: 101 MMOL/L (ref 94–109)
CO2 SERPL-SCNC: 28 MMOL/L (ref 20–32)
CREAT SERPL-MCNC: 0.56 MG/DL (ref 0.66–1.25)
GFR SERPL CREATININE-BSD FRML MDRD: >90 ML/MIN/1.7M2
GLUCOSE SERPL-MCNC: 285 MG/DL (ref 70–99)
LDLC SERPL DIRECT ASSAY-MCNC: 114 MG/DL
POTASSIUM SERPL-SCNC: 4.2 MMOL/L (ref 3.4–5.3)
PROT SERPL-MCNC: 8.2 G/DL (ref 6.8–8.8)
SODIUM SERPL-SCNC: 134 MMOL/L (ref 133–144)

## 2018-09-13 RX ORDER — INSULIN GLARGINE 100 [IU]/ML
52 INJECTION, SOLUTION SUBCUTANEOUS AT BEDTIME
Qty: 30 ML | Refills: 1
Start: 2018-09-13 | End: 2019-04-14

## 2018-10-09 ENCOUNTER — TELEPHONE (OUTPATIENT)
Dept: FAMILY MEDICINE | Facility: CLINIC | Age: 63
End: 2018-10-09

## 2018-10-16 ENCOUNTER — OFFICE VISIT (OUTPATIENT)
Dept: FAMILY MEDICINE | Facility: CLINIC | Age: 63
End: 2018-10-16
Payer: COMMERCIAL

## 2018-10-16 VITALS
DIASTOLIC BLOOD PRESSURE: 83 MMHG | BODY MASS INDEX: 36.45 KG/M2 | TEMPERATURE: 97.1 F | WEIGHT: 315 LBS | HEIGHT: 78 IN | HEART RATE: 82 BPM | OXYGEN SATURATION: 98 % | SYSTOLIC BLOOD PRESSURE: 143 MMHG | RESPIRATION RATE: 18 BRPM

## 2018-10-16 DIAGNOSIS — B18.2 CHRONIC HEPATITIS C WITHOUT HEPATIC COMA (H): ICD-10-CM

## 2018-10-16 DIAGNOSIS — Z23 NEED FOR PROPHYLACTIC VACCINATION AND INOCULATION AGAINST INFLUENZA: ICD-10-CM

## 2018-10-16 DIAGNOSIS — M17.10 PRIMARY LOCALIZED OSTEOARTHROSIS OF LOWER LEG, UNSPECIFIED LATERALITY: Primary | ICD-10-CM

## 2018-10-16 DIAGNOSIS — F19.20 CHEMICAL DEPENDENCY (H): ICD-10-CM

## 2018-10-16 DIAGNOSIS — E11.65 TYPE 2 DIABETES MELLITUS WITH HYPERGLYCEMIA, UNSPECIFIED WHETHER LONG TERM INSULIN USE (H): ICD-10-CM

## 2018-10-16 PROCEDURE — 90682 RIV4 VACC RECOMBINANT DNA IM: CPT | Performed by: FAMILY MEDICINE

## 2018-10-16 PROCEDURE — G0008 ADMIN INFLUENZA VIRUS VAC: HCPCS | Performed by: FAMILY MEDICINE

## 2018-10-16 PROCEDURE — 99214 OFFICE O/P EST MOD 30 MIN: CPT | Mod: 25 | Performed by: FAMILY MEDICINE

## 2018-10-16 RX ORDER — MORPHINE SULFATE 15 MG/1
7.5 TABLET ORAL EVERY 8 HOURS PRN
Qty: 45 TABLET | Refills: 0 | Status: SHIPPED | OUTPATIENT
Start: 2018-10-16 | End: 2018-11-14

## 2018-10-16 NOTE — MR AVS SNAPSHOT
"              After Visit Summary   10/16/2018    Michael Colorado    MRN: [id number]           Patient Information     Date Of Birth          1955        Visit Information        Provider Department      10/16/2018 11:20 AM Edison New MD [facility]        Today's Diagnoses     Primary localized osteoarthrosis of lower leg, unspecified laterality    -  1    Chemical dependency (H)        Chronic hepatitis C without hepatic coma (H)        Type 2 diabetes mellitus with hyperglycemia, unspecified whether long term insulin use (H)        Need for prophylactic vaccination and inoculation against influenza           Follow-ups after your visit        Follow-up notes from your care team     Return in about 1 month (around 11/16/2018) for med check.      Who to contact     If you have questions or need follow up information about today's clinic visit or your schedule please contact [facility] [place] directly at [phone].  Normal or non-critical lab and imaging results will be communicated to you by MyChart, letter or phone within 4 business days after the clinic has received the results. If you do not hear from us within 7 days, please contact the clinic through MyChart or phone. If you have a critical or abnormal lab result, we will notify you by phone as soon as possible.  Submit refill requests through [employer] or call your pharmacy and they will forward the refill request to us. Please allow 3 business days for your refill to be completed.          Additional Information About Your Visit        Care EveryWhere ID     This is your Care EveryWhere ID. This could be used by other organizations to access your [facility] medical records  [id number]        Your Vitals Were     Pulse Temperature Respirations Height Pulse Oximetry BMI (Body Mass Index)    82 97.1  F (36.2  C) (Tympanic) 18 6' 7.5\" (2.019 m) 98% 40.71 kg/m2       Blood Pressure from Last 3 Encounters:   10/16/18 143/83 "   09/12/18 136/85   08/08/18 113/73    Weight from Last 3 Encounters:   10/16/18 (!) 366 lb (166 kg)   09/12/18 (!) 367 lb (166.5 kg)   08/08/18 (!) 360 lb (163.3 kg)              We Performed the Following     ADMIN INFLUENZA (For MEDICARE Patients ONLY) []     FLU VACCINE, (RIV4) RECOMBINANT HA  , IM (FluBlok, egg free) [48430]- >18 YRS ([facility] recommended  50-64 YRS)          Today's Medication Changes          These changes are accurate as of 10/16/18 12:24 PM.  If you have any questions, ask your nurse or doctor.               These medicines have changed or have updated prescriptions.        Dose/Directions    morphine 15 MG IR tablet   Commonly known as:  MSIR   This may have changed:    - how much to take  - when to take this  - reasons to take this   Used for:  Chemical dependency (H), Primary localized osteoarthrosis of lower leg, unspecified laterality   Changed by:  Edison New MD        Dose:  7.5 mg   Take 0.5 tablets (7.5 mg) by mouth every 8 hours as needed for severe pain   Quantity:  45 tablet   Refills:  0            Where to get your medicines      Some of these will need a paper prescription and others can be bought over the counter.  Ask your nurse if you have questions.     Bring a paper prescription for each of these medications     morphine 15 MG IR tablet               Information about OPIOIDS     PRESCRIPTION OPIOIDS: WHAT YOU NEED TO KNOW   We gave you an opioid (narcotic) pain medicine. It is important to manage your pain, but opioids are not always the best choice. You should first try all the other options your care team gave you. Take this medicine for as short a time (and as few doses) as possible.    Some activities can increase your pain, such as bandage changes or therapy sessions. It may help to take your pain medicine 30 to 60 minutes before these activities. Reduce your stress by getting enough sleep, working on hobbies you enjoy and practicing relaxation or meditation.  Talk to your care team about ways to manage your pain beyond prescription opioids.    These medicines have risks:    DO NOT drive when on new or higher doses of pain medicine. These medicines can affect your alertness and reaction times, and you could be arrested for driving under the influence (DUI). If you need to use opioids long-term, talk to your care team about driving.    DO NOT operate heavy machinery    DO NOT do any other dangerous activities while taking these medicines.    DO NOT drink any alcohol while taking these medicines.     If the opioid prescribed includes acetaminophen, DO NOT take with any other medicines that contain acetaminophen. Read all labels carefully. Look for the word  acetaminophen  or  Tylenol.  Ask your pharmacist if you have questions or are unsure.    You can get addicted to pain medicines, especially if you have a history of addiction (chemical, alcohol or substance dependence). Talk to your care team about ways to reduce this risk.    All opioids tend to cause constipation. Drink plenty of water and eat foods that have a lot of fiber, such as fruits, vegetables, prune juice, apple juice and high-fiber cereal. Take a laxative (Miralax, milk of magnesia, Colace, Senna) if you don t move your bowels at least every other day. Other side effects include upset stomach, sleepiness, dizziness, throwing up, tolerance (needing more of the medicine to have the same effect), physical dependence and slowed breathing.    Store your pills in a secure place, locked if possible. We will not replace any lost or stolen medicine. If you don t finish your medicine, please throw away (dispose) as directed by your pharmacist. The [place] Pollution Control Agency has more information about safe disposal: [email or web address]://[email or web address].[facility].[place].us/living-green/managing-unwanted-medications         Primary Care Provider Office Phone # Fax #    Edison New [phone]       [address] [place] [address]  DRIVE  [place] [address]        Equal Access to Services     JESSICA PALUMBO : Hadii aad ku hadshereenlily Rebolledo, wajuanda kartik, qaybta kimberlymawing montgomery. So [facility] [phone].    ATENCIÓN: Si habla español, tiene a howe disposición servicios gratuitos de asistencia lingüística. Llame al [phone].    We comply with applicable federal civil rights laws and [place] laws. We do not discriminate on the basis of race, color, national origin, age, disability, sex, sexual orientation, or gender identity.            Thank you!     Thank you for choosing [facility] [place]  for your care. Our goal is always to provide you with excellent care. Hearing back from our patients is one way we can continue to improve our services. Please take a few minutes to complete the written survey that you may receive in the mail after your visit with us. Thank you!             Your Updated Medication List - Protect others around you: Learn how to safely use, store and throw away your medicines at www.disposemymeds.org.          This list is accurate as of 10/16/18 12:24 PM.  Always use your most recent med list.                   Brand Name Dispense Instructions for use Diagnosis    aspirin 81 MG tablet      Take by mouth daily    Type II or unspecified type diabetes mellitus without mention of complication, uncontrolled       blood glucose monitoring lancets     100 each    Use to test blood sugar 3 times daily or as directed.  Ok to substitute alternative if insurance prefers.    Type 2 diabetes mellitus with hyperglycemia, with long-term current use of insulin (H)       * blood glucose monitoring test strip    no brand specified    100 strip    Use to test blood sugars 4 times daily or as directed please provide per formulary approval patient states Verio.    Poorly controlled type II diabetes mellitus with renal complication (H), Morbid obesity due to excess calories (H)       *  blood glucose monitoring test strip    ONETOUCH VERIO IQ    100 each    Use to test blood sugars 3 times daily or as directed.    Type 2 diabetes mellitus with hyperglycemia, with long-term current use of insulin (H)       insulin pen needle 32G X 4 MM     150 each    Use 4 pen needles daily or as directed.    Type 2 diabetes mellitus with hyperglycemia, with long-term current use of insulin (H)       * LANTUS SOLOSTAR 100 UNIT/ML injection   Generic drug:  insulin glargine     15 mL    ADMINISTER 46 UNITS UNDER THE SKIN AT BEDTIME    Type 2 diabetes mellitus with hyperglycemia, with long-term current use of insulin (H)       * BASAGLAR 100 UNIT/ML injection     30 mL    Inject 52 Units Subcutaneous At Bedtime    Type 2 diabetes mellitus with hyperglycemia, with long-term current use of insulin (H)       metFORMIN 1000 MG tablet    GLUCOPHAGE    180 tablet    Take 1 tablet (1,000 mg) by mouth 2 times daily (with meals)    Poorly controlled type 2 diabetes mellitus (H)       morphine 15 MG IR tablet    MSIR    45 tablet    Take 0.5 tablets (7.5 mg) by mouth every 8 hours as needed for severe pain    Chemical dependency (H), Primary localized osteoarthrosis of lower leg, unspecified laterality       NovoLOG FLEXPEN 100 UNIT/ML injection   Generic drug:  insulin aspart     15 mL    SEE NOTES    Type 2 diabetes mellitus with hyperglycemia, with long-term current use of insulin (H)       polyethylene glycol powder    MIRALAX/GLYCOLAX    527 g    TAKE 17 GRAMS(1 CAPFUL) BY MOUTH DAILY    Slow transit constipation       VICTOZA PEN 18 MG/3ML soln   Generic drug:  liraglutide     6 mL    START WITH INJECTING 0.6MG DAILY X 1 WEEK, THEN INCREASE TO 1.2MG DAILY    Poorly controlled type 2 diabetes mellitus (H)       * Notice:  This list has 4 medication(s) that are the same as other medications prescribed for you. Read the directions carefully, and ask your doctor or other care provider to review them with you.

## 2018-10-16 NOTE — PROGRESS NOTES

## 2018-10-16 NOTE — PROGRESS NOTES
SUBJECTIVE:   Michael Colorado is a 63 year old male who presents to clinic today for the following health issues:      Diabetes Follow-up    Patient is checking blood sugars: once daily.  Results are as follows:         bedtime - 100-170    Diabetic concerns: None     Symptoms of hypoglycemia (low blood sugar): none     Paresthesias (numbness or burning in feet) or sores: Yes     Date of last diabetic eye exam: not sure     BP Readings from Last 2 Encounters:   09/12/18 136/85   08/08/18 113/73     Hemoglobin A1C (%)   Date Value   09/12/2018 7.7 (H)   04/06/2018 6.8 (H)     LDL Cholesterol Calculated (mg/dL)   Date Value   01/09/2018 80   12/03/2015 71     LDL Cholesterol Direct (mg/dL)   Date Value   09/12/2018 114 (H)       Diabetes Management Resources    Amount of exercise or physical activity: 4-5 days/week for an average of 15-30 minutes    Problems taking medications regularly: No    Medication side effects: none    Diet: regular (no restrictions)      Problem list and histories reviewed & adjusted, as indicated.  Additional history: as documented    Patient Active Problem List   Diagnosis     Other and unspecified disc disorder     Tinnitus     CARDIOVASCULAR SCREENING; LDL GOAL LESS THAN 130     Advanced directives, counseling/discussion     Primary localized osteoarthrosis, lower leg     Smoker     Morbid obesity with BMI of 45.0-49.9, adult (H)     Morbid obesity (H)     Chronic pain syndrome     Hepatitis C     Obstructive sleep apnea     Tobacco use disorder     Type 2 diabetes mellitus with hyperglycemia (H)     Chemical dependency (H)     Hepatitis C, chronic (H)     Past Surgical History:   Procedure Laterality Date     LAPAROSCOPIC HERNIORRHAPHY VENTRAL N/A 3/13/2018    Procedure: LAPAROSCOPIC HERNIORRHAPHY VENTRAL;  LAPAROSCOPIC VENTRAL HERNIA REPAIR WITH MESH  ;  Surgeon: Jairo Angel MD;  Location: [facility]     ORTHOPEDIC SURGERY Bilateral     ARTHROSCOPY - L X 1, R X 2     SURGICAL HISTORY OF -    10/03    Lt knee partial medial meniscectomy       Social History   Substance Use Topics     Smoking status: Current Every Day Smoker     Packs/day: 1.00     Years: 30.00     Types: Cigarettes     Smokeless tobacco: Never Used     Alcohol use No      Comment: quit      Family History   Problem Relation Age of Onset     Diabetes Brother          Current Outpatient Prescriptions   Medication Sig Dispense Refill     aspirin 81 MG tablet Take by mouth daily       BASAGLAR 100 UNIT/ML injection Inject 52 Units Subcutaneous At Bedtime 30 mL 1     blood glucose monitoring (ONE TOUCH DELICA) lancets Use to test blood sugar 3 times daily or as directed.  Ok to substitute alternative if insurance prefers. 100 each 6     blood glucose monitoring (ONETOUCH VERIO IQ) test strip Use to test blood sugars 3 times daily or as directed. 100 each 6     insulin pen needle 32G X 4 MM Use 4 pen needles daily or as directed. 150 each 3     LANTUS SOLOSTAR 100 UNIT/ML soln ADMINISTER 46 UNITS UNDER THE SKIN AT BEDTIME 15 mL 0     metFORMIN (GLUCOPHAGE) 1000 MG tablet Take 1 tablet (1,000 mg) by mouth 2 times daily (with meals) 180 tablet 1     morphine (MSIR) 15 MG IR tablet Take 0.5 tablets (7.5 mg) by mouth every 8 hours as needed for severe pain 45 tablet 0     NOVOLOG FLEXPEN 100 UNIT/ML soln SEE NOTES 15 mL 0     polyethylene glycol (MIRALAX/GLYCOLAX) powder TAKE 17 GRAMS(1 CAPFUL) BY MOUTH DAILY 527 g 0     VICTOZA PEN 18 MG/3ML soln START WITH INJECTING 0.6MG DAILY X 1 WEEK, THEN INCREASE TO 1.2MG DAILY 6 mL 0     blood glucose monitoring (NO BRAND SPECIFIED) test strip Use to test blood sugars 4 times daily or as directed please provide per formulary approval patient states Verio. (Patient not taking: Reported on 8/8/2018) 100 strip 3     Allergies   Allergen Reactions     No Known Drug Allergies      Recent Labs   Lab Test  09/12/18   1157  04/06/18   1158   01/09/18   1122   12/03/15   1021   02/05/15   0837  04/29/13   1138  "  A1C  7.7*  6.8*   --   11.1*   < >  6.7*   < >  7.0*  6.4*   LDL  114*   --    --   80   --   71   --   77  112   HDL   --    --    --   30*   --   28*   --   32*  35*   TRIG   --    --    --   368*   --   312*   --   287*  218*   ALT  30  18   --   25   < >   --    --   42  50   CR  0.56*  0.64*   < >  0.58*   < >  0.60*   < >  0.64*  0.60*   GFRESTIMATED  >90  >90   < >  >90   < >  >90  Non  GFR Calc     < >  >90  Non  GFR Calc    >90   GFRESTBLACK  >90  >90   < >  >90   < >  >90   GFR Calc     < >  >90   GFR Calc    >90   POTASSIUM  4.2  4.2   < >  4.3   < >  3.9   < >  4.0  4.1   TSH   --    --    --   2.20   --    --    --    --   1.25    < > = values in this interval not displayed.      BP Readings from Last 3 Encounters:   10/16/18 143/83   09/12/18 136/85   08/08/18 113/73    Wt Readings from Last 3 Encounters:   10/16/18 (!) 366 lb (166 kg)   09/12/18 (!) 367 lb (166.5 kg)   08/08/18 (!) 360 lb (163.3 kg)                    Reviewed and updated as needed this visit by clinical staff  Allergies       Reviewed and updated as needed this visit by Provider         ROS:  Constitutional, HEENT, cardiovascular, pulmonary, gi and gu systems are negative, except as otherwise noted.    OBJECTIVE:     /83 (Cuff Size: Adult Large)  Pulse 82  Temp 97.1  F (36.2  C) (Tympanic)  Resp 18  Ht 6' 7.5\" (2.019 m)  Wt (!) 366 lb (166 kg)  SpO2 98%  BMI 40.71 kg/m2  Body mass index is 40.71 kg/(m^2).  GENERAL: healthy, alert and no distress  NECK: no adenopathy, no asymmetry, masses, or scars and thyroid normal to palpation  RESP: lungs clear to auscultation - no rales, rhonchi or wheezes  CV: regular rate and rhythm, normal S1 S2, no S3 or S4, no murmur, click or rub, no peripheral edema and peripheral pulses strong  ABDOMEN: soft, nontender, no hepatosplenomegaly, no masses and bowel sounds normal  MS: no gross musculoskeletal defects noted, no " edema      ASSESSMENT/PLAN:   ASSESSMENT / PLAN:  (M17.10) Primary localized osteoarthrosis of lower leg, unspecified laterality  (primary encounter diagnosis)  Comment: has been stable with dose adjustment, will have him to taper dose more to 7.5mg tid for next 1month   Plan: morphine (MSIR) 15 MG IR tablet            (F19.20) Chemical dependency (H)  Comment: has been stable   Plan: morphine (MSIR) 15 MG IR tablet            (B18.2) Chronic hepatitis C without hepatic coma (H)  Comment: discussed about treatment, pt is not interested in treatment this time   Plan: will keep monitoring closely     (E11.65) Type 2 diabetes mellitus with hyperglycemia, unspecified whether long term insulin use (H)  Comment: worsened, and increase the dose of basal insulin   Plan: will recheck lab in 4 months       FUTURE APPOINTMENTS:       - Follow-up visit in 1 month     Edison New MD  [facility]

## 2018-10-22 ENCOUNTER — TELEPHONE (OUTPATIENT)
Dept: FAMILY MEDICINE | Facility: CLINIC | Age: 63
End: 2018-10-22

## 2018-10-22 DIAGNOSIS — E78.2 MIXED HYPERLIPIDEMIA: Primary | ICD-10-CM

## 2018-10-22 NOTE — TELEPHONE ENCOUNTER
Patient is Failing the Vascular/DM metrics due to potentially not being prescribed Aspirin/Statin. Please complete/sign an order to satisfy IVD/DM  Quality measure. Kirti Yancey [facility]     Metrics  Percentage of patients (Ages 18-75 years) with a diagnosis of IVD, who meet all of the criteria listed below, in the measurement period.       The most recent Blood Pressure in the measurement period has a systolic value of less than 140 and a diastolic value of less than 90 (both values must be less than).     The patient is on a statin medication unless contraindication or valid exception is documented.   o LDL values again part of evaluation, with the addition of Statin Medication     any patient age 18 to 20 years = LDL PASS     any patient taking a Statin in measurement period = LDL PASS     any patient with a documented medical exception = LDL PASS     Special case: if there is no LDL data recorded and patient does not meet any of the criteria above, LDL FAIL   o For all other patients evaluate:   o age 21 - 75 years and LDL < 40 = LDL PASS   o age 21 - 75 years and LDL > 39 = LDL FAIL (unless they meet #2 or #3 above)       Patient is currently a non?tobacco user.   Patient is on daily aspirin OR an accepted contraindication (any date).

## 2018-11-05 DIAGNOSIS — K59.01 SLOW TRANSIT CONSTIPATION: ICD-10-CM

## 2018-11-05 RX ORDER — POLYETHYLENE GLYCOL 3350 17 G/17G
POWDER, FOR SOLUTION ORAL
Qty: 527 G | Refills: 3 | Status: SHIPPED | OUTPATIENT
Start: 2018-11-05 | End: 2019-02-13

## 2018-11-05 NOTE — TELEPHONE ENCOUNTER
Prescription approved per [facility], [facility] or [facility] refill protocol.  Nahomi Torres RN - Triage  [facility]

## 2018-11-05 NOTE — TELEPHONE ENCOUNTER
"Requested Prescriptions   Pending Prescriptions Disp Refills     polyethylene glycol (MIRALAX/GLYCOLAX) powder [Pharmacy Med Name: POLYETH GLYCOL [id number] NF POWDER [id number]]  Last Written Prescription Date:  10/5/18  Last Fill Quantity: 527g,  # refills: 0   Last office visit: 10/16/2018 with prescribing provider:  jacqueline   Future Office Visit:     527 g 0     Sig: TAKE 17 GRAMS(1 CAPFUL) BY MOUTH DAILY    Laxatives Protocol Passed    11/5/2018  1:32 PM       Passed - Patient is age 6 or older       Passed - Recent (12 mo) or future (30 days) visit within the authorizing provider's specialty    Patient had office visit in the last 12 months or has a visit in the next 30 days with authorizing provider or within the authorizing provider's specialty.  See \"Patient Info\" tab in inbasket, or \"Choose Columns\" in Meds & Orders section of the refill encounter.                "

## 2018-11-14 ENCOUNTER — OFFICE VISIT (OUTPATIENT)
Dept: FAMILY MEDICINE | Facility: CLINIC | Age: 63
End: 2018-11-14
Payer: COMMERCIAL

## 2018-11-14 VITALS
WEIGHT: 315 LBS | OXYGEN SATURATION: 97 % | DIASTOLIC BLOOD PRESSURE: 82 MMHG | HEIGHT: 78 IN | HEART RATE: 95 BPM | SYSTOLIC BLOOD PRESSURE: 136 MMHG | BODY MASS INDEX: 36.45 KG/M2 | TEMPERATURE: 97.1 F | RESPIRATION RATE: 16 BRPM

## 2018-11-14 DIAGNOSIS — M17.10 PRIMARY LOCALIZED OSTEOARTHROSIS OF LOWER LEG, UNSPECIFIED LATERALITY: ICD-10-CM

## 2018-11-14 DIAGNOSIS — F19.20 CHEMICAL DEPENDENCY (H): ICD-10-CM

## 2018-11-14 PROCEDURE — 99214 OFFICE O/P EST MOD 30 MIN: CPT | Performed by: FAMILY MEDICINE

## 2018-11-14 RX ORDER — MORPHINE SULFATE 15 MG/1
7.5 TABLET ORAL EVERY 8 HOURS PRN
Qty: 45 TABLET | Refills: 0 | Status: SHIPPED | OUTPATIENT
Start: 2018-11-14 | End: 2018-12-14

## 2018-11-14 RX ORDER — MORPHINE SULFATE 15 MG/1
7.5 TABLET ORAL 2 TIMES DAILY PRN
Qty: 30 TABLET | Refills: 0 | Status: CANCELLED | OUTPATIENT
Start: 2018-11-14

## 2018-11-14 NOTE — MR AVS SNAPSHOT
"              After Visit Summary   11/14/2018    Michael Colorado    MRN: [id number]           Patient Information     Date Of Birth          1955        Visit Information        Provider Department      11/14/2018 11:20 AM Edison New MD [facility]        Today's Diagnoses     Chemical dependency (H)        Primary localized osteoarthrosis of lower leg, unspecified laterality           Follow-ups after your visit        Follow-up notes from your care team     Return in about 1 month (around 12/14/2018) for med check.      Who to contact     If you have questions or need follow up information about today's clinic visit or your schedule please contact [facility] directly at [phone].  Normal or non-critical lab and imaging results will be communicated to you by MyChart, letter or phone within 4 business days after the clinic has received the results. If you do not hear from us within 7 days, please contact the clinic through MyChart or phone. If you have a critical or abnormal lab result, we will notify you by phone as soon as possible.  Submit refill requests through [employer] or call your pharmacy and they will forward the refill request to us. Please allow 3 business days for your refill to be completed.          Additional Information About Your Visit        Care EveryWhere ID     This is your Care EveryWhere ID. This could be used by other organizations to access your [place] medical records  [id number]        Your Vitals Were     Pulse Temperature Respirations Height Pulse Oximetry BMI (Body Mass Index)    95 97.1  F (36.2  C) (Tympanic) 16 6' 7.5\" (2.019 m) 97% 41.16 kg/m2       Blood Pressure from Last 3 Encounters:   11/14/18 136/82   10/16/18 143/83   09/12/18 136/85    Weight from Last 3 Encounters:   11/14/18 (!) 370 lb (167.8 kg)   10/16/18 (!) 366 lb (166 kg)   09/12/18 (!) 367 lb (166.5 kg)              Today, you had the following     No orders found for " display         Where to get your medicines      Some of these will need a paper prescription and others can be bought over the counter.  Ask your nurse if you have questions.     Bring a paper prescription for each of these medications     morphine 15 MG IR tablet         Information about OPIOIDS     PRESCRIPTION OPIOIDS: WHAT YOU NEED TO KNOW   We gave you an opioid (narcotic) pain medicine. It is important to manage your pain, but opioids are not always the best choice. You should first try all the other options your care team gave you. Take this medicine for as short a time (and as few doses) as possible.    Some activities can increase your pain, such as bandage changes or therapy sessions. It may help to take your pain medicine 30 to 60 minutes before these activities. Reduce your stress by getting enough sleep, working on hobbies you enjoy and practicing relaxation or meditation. Talk to your care team about ways to manage your pain beyond prescription opioids.    These medicines have risks:    DO NOT drive when on new or higher doses of pain medicine. These medicines can affect your alertness and reaction times, and you could be arrested for driving under the influence (DUI). If you need to use opioids long-term, talk to your care team about driving.    DO NOT operate heavy machinery    DO NOT do any other dangerous activities while taking these medicines.    DO NOT drink any alcohol while taking these medicines.     If the opioid prescribed includes acetaminophen, DO NOT take with any other medicines that contain acetaminophen. Read all labels carefully. Look for the word  acetaminophen  or  Tylenol.  Ask your pharmacist if you have questions or are unsure.    You can get addicted to pain medicines, especially if you have a history of addiction (chemical, alcohol or substance dependence). Talk to your care team about ways to reduce this risk.    All opioids tend to cause constipation. Drink plenty of water  and eat foods that have a lot of fiber, such as fruits, vegetables, prune juice, apple juice and high-fiber cereal. Take a laxative (Miralax, milk of magnesia, Colace, Senna) if you don t move your bowels at least every other day. Other side effects include upset stomach, sleepiness, dizziness, throwing up, tolerance (needing more of the medicine to have the same effect), physical dependence and slowed breathing.    Store your pills in a secure place, locked if possible. We will not replace any lost or stolen medicine. If you don t finish your medicine, please throw away (dispose) as directed by your pharmacist. The [place] [employer]ution [employer] has more information about safe disposal: https://www.pca.[facility].[place].us/living-green/managing-unwanted-medications         Primary Care Provider Office Phone # Fax #    Edison New [phone]       [address] [facility] [address]        Equal Access to Services     JESSICA PALUMBO : Hadii aad ku hadasho Soomaali, waaxda luqadaha, qaybta kaalmada adeegyada, waxay merrittin liam gutierrez . So [facility] [phone].    ATENCIÓN: Si habla español, tiene a howe disposición servicios gratuitos de asistencia lingüística. Llame al [phone].    We comply with applicable federal civil rights laws and [place] laws. We do not discriminate on the basis of race, color, national origin, age, disability, sex, sexual orientation, or gender identity.            Thank you!     Thank you for choosing [facility]  [place] your care. Our goal is always to provide you with excellent care. Hearing back from our patients is one way we can continue to improve our services. Please take a few minutes to complete the written survey that you may receive in the mail after your visit with us. Thank you!             Your Updated Medication List - Protect others around you: Learn how to safely use, store and throw away your medicines at  www.disposemymeds.org.          This list is accurate as of 11/14/18 11:48 AM.  Always use your most recent med list.                   Brand Name Dispense Instructions for use Diagnosis    aspirin 81 MG tablet      Take by mouth daily    Type II or unspecified type diabetes mellitus without mention of complication, uncontrolled       BASAGLAR 100 UNIT/ML injection     30 mL    Inject 52 Units Subcutaneous At Bedtime    Type 2 diabetes mellitus with hyperglycemia, with long-term current use of insulin (H)       blood glucose monitoring lancets     100 each    Use to test blood sugar 3 times daily or as directed.  Ok to substitute alternative if insurance prefers.    Type 2 diabetes mellitus with hyperglycemia, with long-term current use of insulin (H)       * blood glucose monitoring test strip    no brand specified    100 strip    Use to test blood sugars 4 times daily or as directed please provide per formulary approval patient states Verio.    Poorly controlled type II diabetes mellitus with renal complication (H), Morbid obesity due to excess calories (H)       * blood glucose monitoring test strip    ONETOUCH VERIO IQ    100 each    Use to test blood sugars 3 times daily or as directed.    Type 2 diabetes mellitus with hyperglycemia, with long-term current use of insulin (H)       insulin pen needle 32G X 4 MM     150 each    Use 4 pen needles daily or as directed.    Type 2 diabetes mellitus with hyperglycemia, with long-term current use of insulin (H)       metFORMIN 1000 MG tablet    GLUCOPHAGE    180 tablet    Take 1 tablet (1,000 mg) by mouth 2 times daily (with meals)    Poorly controlled type 2 diabetes mellitus (H)       morphine 15 MG IR tablet    MSIR    45 tablet    Take 0.5 tablets (7.5 mg) by mouth every 8 hours as needed for severe pain    Chemical dependency (H), Primary localized osteoarthrosis of lower leg, unspecified laterality       NovoLOG FLEXPEN 100 UNIT/ML injection   Generic drug:   insulin aspart     15 mL    SEE NOTES    Type 2 diabetes mellitus with hyperglycemia, with long-term current use of insulin (H)       polyethylene glycol powder    MIRALAX/GLYCOLAX    527 g    TAKE 17 GRAMS(1 CAPFUL) BY MOUTH DAILY    Slow transit constipation       STATIN NOT PRESCRIBED (INTENTIONAL)      1 each daily Please choose reason not prescribed, below    Mixed hyperlipidemia       VICTOZA PEN 18 MG/3ML soln   Generic drug:  liraglutide     6 mL    START WITH INJECTING 0.6MG DAILY X 1 WEEK, THEN INCREASE TO 1.2MG DAILY    Poorly controlled type 2 diabetes mellitus (H)       * Notice:  This list has 2 medication(s) that are the same as other medications prescribed for you. Read the directions carefully, and ask your doctor or other care provider to review them with you.

## 2018-11-14 NOTE — PROGRESS NOTES
SUBJECTIVE:   Michael Colorado is a 63 year old male who presents to clinic today for the following health issues:      Medication Followup of Morphine     Taking Medication as prescribed: yes    Side Effects:  None    Medication Helping Symptoms:  yes       Problem list and histories reviewed & adjusted, as indicated.  Additional history: as documented    Patient Active Problem List   Diagnosis     Other and unspecified disc disorder     Tinnitus     CARDIOVASCULAR SCREENING; LDL GOAL LESS THAN 130     Advanced directives, counseling/discussion     Primary localized osteoarthrosis, lower leg     Smoker     Morbid obesity with BMI of 45.0-49.9, adult (H)     Morbid obesity (H)     Chronic pain syndrome     Hepatitis C     Obstructive sleep apnea     Tobacco use disorder     Type 2 diabetes mellitus with hyperglycemia (H)     Chemical dependency (H)     Hepatitis C, chronic (H)     Past Surgical History:   Procedure Laterality Date     LAPAROSCOPIC HERNIORRHAPHY VENTRAL N/A 3/13/2018    Procedure: LAPAROSCOPIC HERNIORRHAPHY VENTRAL;  LAPAROSCOPIC VENTRAL HERNIA REPAIR WITH MESH  ;  Surgeon: Jairo Angel MD;  Location: [facility]     ORTHOPEDIC SURGERY Bilateral     ARTHROSCOPY - L X 1, R X 2     SURGICAL HISTORY OF -   10/03    Lt knee partial medial meniscectomy       Social History   Substance Use Topics     Smoking status: Current Every Day Smoker     Packs/day: 1.00     Years: 30.00     Types: Cigarettes     Smokeless tobacco: Never Used     Alcohol use No      Comment: quit      Family History   Problem Relation Age of Onset     Diabetes Brother          Current Outpatient Prescriptions   Medication Sig Dispense Refill     aspirin 81 MG tablet Take by mouth daily       BASAGLAR 100 UNIT/ML injection Inject 52 Units Subcutaneous At Bedtime 30 mL 1     blood glucose monitoring (ONE TOUCH DELICA) lancets Use to test blood sugar 3 times daily or as directed.  Ok to substitute alternative if insurance prefers. 100 each  6     blood glucose monitoring (ONETOUCH VERIO IQ) test strip Use to test blood sugars 3 times daily or as directed. 100 each 6     insulin pen needle 32G X 4 MM Use 4 pen needles daily or as directed. 150 each 3     metFORMIN (GLUCOPHAGE) 1000 MG tablet Take 1 tablet (1,000 mg) by mouth 2 times daily (with meals) 180 tablet 1     morphine (MSIR) 15 MG IR tablet Take 0.5 tablets (7.5 mg) by mouth every 8 hours as needed for severe pain 45 tablet 0     NOVOLOG FLEXPEN 100 UNIT/ML soln SEE NOTES 15 mL 0     polyethylene glycol (MIRALAX/GLYCOLAX) powder TAKE 17 GRAMS(1 CAPFUL) BY MOUTH DAILY 527 g 3     STATIN NOT PRESCRIBED, INTENTIONAL, 1 each daily Please choose reason not prescribed, below       VICTOZA PEN 18 MG/3ML soln START WITH INJECTING 0.6MG DAILY X 1 WEEK, THEN INCREASE TO 1.2MG DAILY 6 mL 0     blood glucose monitoring (NO BRAND SPECIFIED) test strip Use to test blood sugars 4 times daily or as directed please provide per formulary approval patient states Verio. (Patient not taking: Reported on 8/8/2018) 100 strip 3     [DISCONTINUED] LANTUS SOLOSTAR 100 UNIT/ML soln ADMINISTER 46 UNITS UNDER THE SKIN AT BEDTIME (Patient not taking: Reported on 11/14/2018) 15 mL 0     Allergies   Allergen Reactions     No Known Drug Allergies      Recent Labs   Lab Test  09/12/18   1157  04/06/18   1158   01/09/18   1122   12/03/15   1021   02/05/15   0837  04/29/13   1138   A1C  7.7*  6.8*   --   11.1*   < >  6.7*   < >  7.0*  6.4*   LDL  114*   --    --   80   --   71   --   77  112   HDL   --    --    --   30*   --   28*   --   32*  35*   TRIG   --    --    --   368*   --   312*   --   287*  218*   ALT  30  18   --   25   < >   --    --   42  50   CR  0.56*  0.64*   < >  0.58*   < >  0.60*   < >  0.64*  0.60*   GFRESTIMATED  >90  >90   < >  >90   < >  >90  Non  GFR Calc     < >  >90  Non  GFR Calc    >90   GFRESTBLACK  >90  >90   < >  >90   < >  >90   GFR Calc     < >   ">90   GFR Calc    >90   POTASSIUM  4.2  4.2   < >  4.3   < >  3.9   < >  4.0  4.1   TSH   --    --    --   2.20   --    --    --    --   1.25    < > = values in this interval not displayed.      BP Readings from Last 3 Encounters:   11/14/18 136/82   10/16/18 143/83   09/12/18 136/85    Wt Readings from Last 3 Encounters:   11/14/18 (!) 370 lb (167.8 kg)   10/16/18 (!) 366 lb (166 kg)   09/12/18 (!) 367 lb (166.5 kg)                    Reviewed and updated as needed this visit by clinical staff       Reviewed and updated as needed this visit by Provider         ROS:  Constitutional, HEENT, cardiovascular, pulmonary, gi and gu systems are negative, except as otherwise noted.    OBJECTIVE:     /82 (Cuff Size: Adult Large)  Pulse 95  Temp 97.1  F (36.2  C) (Tympanic)  Resp 16  Ht 6' 7.5\" (2.019 m)  Wt (!) 370 lb (167.8 kg)  SpO2 97%  BMI 41.16 kg/m2  Body mass index is 41.16 kg/(m^2).  GENERAL: healthy, alert and no distress  NECK: no adenopathy, no asymmetry, masses, or scars and thyroid normal to palpation  RESP: lungs clear to auscultation - no rales, rhonchi or wheezes  CV: regular rate and rhythm, normal S1 S2, no S3 or S4, no murmur, click or rub, no peripheral edema and peripheral pulses strong  ABDOMEN: soft, nontender, no hepatosplenomegaly, no masses and bowel sounds normal  MS: no gross musculoskeletal defects noted, no edema        ASSESSMENT/PLAN:   Michael was seen today for recheck medication.    Diagnoses and all orders for this visit:    Chemical dependency (H)  -     morphine (MSIR) 15 MG IR tablet; Take 0.5 tablets (7.5 mg) by mouth every 8 hours as needed for severe pain    Primary localized osteoarthrosis of lower leg, unspecified laterality  -     morphine (MSIR) 15 MG IR tablet; Take 0.5 tablets (7.5 mg) by mouth every 8 hours as needed for severe pain    Other orders  -     Cancel: morphine (MSIR) 15 MG IR tablet; Take 0.5 tablets (7.5 mg) by mouth 2 times daily as " needed for severe pain      Pain has been worsened with cold weather, will have him to keep on maintaining current dose of meds, and consider tapering in 2 month again     FUTURE APPOINTMENTS:       - Follow-up visit in 1 month for med check     A total time of 27 minutes was spent with the patient today. Of this time More than 50% was spent counseling and coordinating of care of the above concerns.      Edison New MD  [facility]

## 2018-12-14 ENCOUNTER — OFFICE VISIT (OUTPATIENT)
Dept: FAMILY MEDICINE | Facility: CLINIC | Age: 63
End: 2018-12-14
Payer: COMMERCIAL

## 2018-12-14 VITALS
BODY MASS INDEX: 36.45 KG/M2 | TEMPERATURE: 97.5 F | HEART RATE: 84 BPM | OXYGEN SATURATION: 97 % | DIASTOLIC BLOOD PRESSURE: 84 MMHG | RESPIRATION RATE: 16 BRPM | WEIGHT: 315 LBS | HEIGHT: 78 IN | SYSTOLIC BLOOD PRESSURE: 137 MMHG

## 2018-12-14 DIAGNOSIS — M17.10 PRIMARY LOCALIZED OSTEOARTHROSIS OF LOWER LEG, UNSPECIFIED LATERALITY: ICD-10-CM

## 2018-12-14 DIAGNOSIS — H57.13 EYE PAIN, BILATERAL: ICD-10-CM

## 2018-12-14 DIAGNOSIS — F19.20 CHEMICAL DEPENDENCY (H): ICD-10-CM

## 2018-12-14 DIAGNOSIS — H10.13 ALLERGIC CONJUNCTIVITIS, BILATERAL: Primary | ICD-10-CM

## 2018-12-14 DIAGNOSIS — K59.01 SLOW TRANSIT CONSTIPATION: ICD-10-CM

## 2018-12-14 PROCEDURE — 99214 OFFICE O/P EST MOD 30 MIN: CPT | Performed by: FAMILY MEDICINE

## 2018-12-14 RX ORDER — MORPHINE SULFATE 15 MG/1
7.5 TABLET ORAL EVERY 8 HOURS PRN
Qty: 45 TABLET | Refills: 0 | Status: SHIPPED | OUTPATIENT
Start: 2018-12-14 | End: 2019-01-14

## 2018-12-14 RX ORDER — OLOPATADINE HYDROCHLORIDE 1 MG/ML
1 SOLUTION/ DROPS OPHTHALMIC 2 TIMES DAILY
Qty: 9 ML | Refills: 3 | Status: SHIPPED | OUTPATIENT
Start: 2018-12-14 | End: 2019-02-13

## 2018-12-14 ASSESSMENT — MIFFLIN-ST. JEOR: SCORE: 2681.19

## 2018-12-14 NOTE — PROGRESS NOTES
SUBJECTIVE:   Michael Colorado is a 63 year old male who presents to clinic today for the following health issues:      Eye Problem      Duration: 2 months, getting worse     Description  conjunctival irritation    Severity: moderate    Accompanying signs and symptoms: headaches     History (predisposing factors):  none    Precipitating or alleviating factors: None    Therapies tried and outcome:  OTC eye drops, not helpful       Problem list and histories reviewed & adjusted, as indicated.  Additional history: as documented    Patient Active Problem List   Diagnosis     Other and unspecified disc disorder     Tinnitus     CARDIOVASCULAR SCREENING; LDL GOAL LESS THAN 130     Advanced directives, counseling/discussion     Primary localized osteoarthrosis, lower leg     Smoker     Morbid obesity with BMI of 45.0-49.9, adult (H)     Morbid obesity (H)     Chronic pain syndrome     Hepatitis C     Obstructive sleep apnea     Tobacco use disorder     Type 2 diabetes mellitus with hyperglycemia (H)     Chemical dependency (H)     Hepatitis C, chronic (H)     Past Surgical History:   Procedure Laterality Date     LAPAROSCOPIC HERNIORRHAPHY VENTRAL N/A 3/13/2018    Procedure: LAPAROSCOPIC HERNIORRHAPHY VENTRAL;  LAPAROSCOPIC VENTRAL HERNIA REPAIR WITH MESH  ;  Surgeon: Jairo Angel MD;  Location: [facility]     ORTHOPEDIC SURGERY Bilateral     ARTHROSCOPY - L X 1, R X 2     SURGICAL HISTORY OF -   10/03    Lt knee partial medial meniscectomy       Social History     Tobacco Use     Smoking status: Current Every Day Smoker     Packs/day: 1.00     Years: 30.00     Pack years: 30.00     Types: Cigarettes     Smokeless tobacco: Never Used   Substance Use Topics     Alcohol use: No     Comment: quit      Family History   Problem Relation Age of Onset     Diabetes Brother          Current Outpatient Medications   Medication Sig Dispense Refill     aspirin 81 MG tablet Take by mouth daily       BASAGLAR 100 UNIT/ML injection Inject  52 Units Subcutaneous At Bedtime 30 mL 1     blood glucose monitoring (ONE TOUCH DELICA) lancets Use to test blood sugar 3 times daily or as directed.  Ok to substitute alternative if insurance prefers. 100 each 6     blood glucose monitoring (ONETOUCH VERIO IQ) test strip Use to test blood sugars 3 times daily or as directed. 100 each 6     insulin pen needle 32G X 4 MM Use 4 pen needles daily or as directed. 150 each 3     metFORMIN (GLUCOPHAGE) 1000 MG tablet Take 1 tablet (1,000 mg) by mouth 2 times daily (with meals) 180 tablet 1     morphine (MSIR) 15 MG IR tablet Take 0.5 tablets (7.5 mg) by mouth every 8 hours as needed for severe pain 45 tablet 0     NOVOLOG FLEXPEN 100 UNIT/ML soln SEE NOTES 15 mL 0     olopatadine (PATANOL) 0.1 % ophthalmic solution Place 1 drop into both eyes 2 times daily 9 mL 3     polyethylene glycol (MIRALAX/GLYCOLAX) powder TAKE 17 GRAMS(1 CAPFUL) BY MOUTH DAILY 527 g 3     VICTOZA PEN 18 MG/3ML soln START WITH INJECTING 0.6MG DAILY X 1 WEEK, THEN INCREASE TO 1.2MG DAILY 6 mL 0     blood glucose monitoring (NO BRAND SPECIFIED) test strip Use to test blood sugars 4 times daily or as directed please provide per formulary approval patient states Verio. (Patient not taking: Reported on 8/8/2018) 100 strip 3     STATIN NOT PRESCRIBED, INTENTIONAL, 1 each daily Please choose reason not prescribed, below (Patient not taking: Reported on 12/14/2018)       Allergies   Allergen Reactions     No Known Drug Allergies      Recent Labs   Lab Test 09/12/18  1157 04/06/18  1158  01/09/18  1122  12/03/15  1021  02/05/15  0837 04/29/13  1138   A1C 7.7* 6.8*  --  11.1*   < > 6.7*   < > 7.0* 6.4*   *  --   --  80  --  71  --  77 112   HDL  --   --   --  30*  --  28*  --  32* 35*   TRIG  --   --   --  368*  --  312*  --  287* 218*   ALT 30 18  --  25   < >  --   --  42 50   CR 0.56* 0.64*   < > 0.58*   < > 0.60*   < > 0.64* 0.60*   GFRESTIMATED >90 >90   < > >90   < > >90  Non   "GFR Calc     < > >90  Non  GFR Calc   >90   GFRESTBLACK >90 >90   < > >90   < > >90  African American GFR Calc     < > >90   GFR Calc   >90   POTASSIUM 4.2 4.2   < > 4.3   < > 3.9   < > 4.0 4.1   TSH  --   --   --  2.20  --   --   --   --  1.25    < > = values in this interval not displayed.      BP Readings from Last 3 Encounters:   12/14/18 137/84   11/14/18 136/82   10/16/18 143/83    Wt Readings from Last 3 Encounters:   12/14/18 (!) 171.9 kg (379 lb)   11/14/18 (!) 167.8 kg (370 lb)   10/16/18 (!) 166 kg (366 lb)                    Reviewed and updated as needed this visit by clinical staff       Reviewed and updated as needed this visit by Provider         ROS:  Constitutional, HEENT, cardiovascular, pulmonary, gi and gu systems are negative, except as otherwise noted.    OBJECTIVE:     /84 (Cuff Size: Adult Large)   Pulse 84   Temp 97.5  F (36.4  C) (Tympanic)   Resp 16   Ht 2.019 m (6' 7.5\")   Wt (!) 171.9 kg (379 lb)   SpO2 97%   BMI 42.16 kg/m    Body mass index is 42.16 kg/m .  GENERAL: healthy, alert and no distress  EYES: Eyes grossly normal to inspection, PERRL and conjunctivae and sclerae normal  HENT: ear canals and TM's normal, nose and mouth without ulcers or lesions  NECK: no adenopathy, no asymmetry, masses, or scars and thyroid normal to palpation  RESP: lungs clear to auscultation - no rales, rhonchi or wheezes  CV: regular rate and rhythm, normal S1 S2, no S3 or S4, no murmur, click or rub, no peripheral edema and peripheral pulses strong  ABDOMEN: soft, nontender, no hepatosplenomegaly, no masses and bowel sounds normal   (male): normal male genitalia without lesions or urethral discharge, no hernia  MS: no gross musculoskeletal defects noted, no edema  SKIN: no suspicious lesions or rashes  NEURO: Normal strength and tone, mentation intact and speech normal  BACK: no CVA tenderness, no paralumbar tenderness  PSYCH: mentation appears normal, " affect normal/bright        ASSESSMENT/PLAN:   ASSESSMENT / PLAN:  (H10.13) Allergic conjunctivitis, bilateral  (primary encounter diagnosis)  Comment: has itching and redness with lid swelling, will have him to try patanol  Plan: olopatadine (PATANOL) 0.1 % ophthalmic solution            (F19.20) Chemical dependency (H)  Comment: stable, has no sign of overdosing nor using other chemicals, will keep him on the current dose for next 1 more month and consider keep doing weaning   Plan: morphine (MSIR) 15 MG IR tablet            (M17.10) Primary localized osteoarthrosis of lower leg, unspecified laterality  Comment: stable   Plan: morphine (MSIR) 15 MG IR tablet            (H57.13) Eye pain, bilateral  Comment: has decreased visual acuity, will have him to see eye clinic for further evaluation   Plan: OPHTHALMOLOGY ADULT REFERRAL            (K59.01) Slow transit constipation  Comment: stable   Plan: will keep him on using miralax as needed      FUTURE APPOINTMENTS:       - Follow-up visit in 1 month for med check     [facility] MD  [facility]

## 2018-12-27 DIAGNOSIS — K59.01 SLOW TRANSIT CONSTIPATION: ICD-10-CM

## 2018-12-27 DIAGNOSIS — E11.65 POORLY CONTROLLED TYPE 2 DIABETES MELLITUS (H): ICD-10-CM

## 2018-12-27 DIAGNOSIS — E11.65 TYPE 2 DIABETES MELLITUS WITH HYPERGLYCEMIA, WITH LONG-TERM CURRENT USE OF INSULIN (H): ICD-10-CM

## 2018-12-27 DIAGNOSIS — Z79.4 TYPE 2 DIABETES MELLITUS WITH HYPERGLYCEMIA, WITH LONG-TERM CURRENT USE OF INSULIN (H): ICD-10-CM

## 2018-12-27 RX ORDER — INSULIN GLARGINE 100 [IU]/ML
49 INJECTION, SOLUTION SUBCUTANEOUS AT BEDTIME
Qty: 30 ML | Refills: 0 | Status: SHIPPED | OUTPATIENT
Start: 2018-12-27 | End: 2019-02-13

## 2018-12-27 RX ORDER — LIRAGLUTIDE 6 MG/ML
INJECTION SUBCUTANEOUS
Qty: 6 ML | Refills: 0 | Status: SHIPPED | OUTPATIENT
Start: 2018-12-27 | End: 2019-02-13

## 2018-12-27 RX ORDER — POLYETHYLENE GLYCOL 3350 17 G/17G
POWDER, FOR SOLUTION ORAL
Qty: 527 G | Refills: 0 | OUTPATIENT
Start: 2018-12-27

## 2018-12-27 RX ORDER — INSULIN ASPART 100 [IU]/ML
INJECTION, SOLUTION INTRAVENOUS; SUBCUTANEOUS
Qty: 15 ML | Refills: 0 | Status: SHIPPED | OUTPATIENT
Start: 2018-12-27 | End: 2019-04-30

## 2018-12-27 NOTE — TELEPHONE ENCOUNTER
"Requested Prescriptions   Pending Prescriptions Disp Refills     VICTOZA PEN 18 MG/3ML soln [Pharmacy Med Name: VICTOZA 18MG/3ML INJ PEN 3ML] 6 mL 0     Sig: START WITH INJECTING 0.6MG DAILY X 1 WEEK, THEN INCREASE TO 1.2MG DAILY  Last Written Prescription Date:  10/5/18  Last Fill Quantity: 6,  # refills: 0   Last office visit: 12/14/2018 with prescribing provider:  Shaq   Future Office Visit:        GLP-1 Agonists Protocol Failed - 12/27/2018  2:50 PM       Failed - Normal serum creatinine on file in past 12 months    Recent Labs   Lab Test 09/12/18  1157   CR 0.56*            Passed - Blood pressure less than 140/90 in past 6 months    BP Readings from Last 3 Encounters:   12/14/18 137/84   11/14/18 136/82   10/16/18 143/83                Passed - LDL on file in past 12 months    Recent Labs   Lab Test 09/12/18  1157   *            Passed - Microalbumin on file in past 12 months    Recent Labs   Lab Test 01/09/18  1122   MICROL 6   UMALCR 7.11            Passed - HgbA1C in past 3 or 6 months    If HgbA1C is 8 or greater, it needs to be on file within the past 3 months.  If less than 8, must be on file within the past 6 months.     Recent Labs   Lab Test 09/12/18  1157   A1C 7.7*            Passed - Patient is age 18 or older       Passed - Recent (6 mo) or future (30 days) visit within the authorizing provider's specialty    Patient had office visit in the last 6 months or has a visit in the next 30 days with authorizing provider.  See \"Patient Info\" tab in inbasket, or \"Choose Columns\" in Meds & Orders section of the refill encounter.            NOVOLOG FLEXPEN 100 UNIT/ML soln [Pharmacy Med Name: NOVOLOG FLEXPEN INJ 3ML (ORANGE)] 15 mL 0     Sig: SEE NOTES  Last Written Prescription Date:  10/5/18  Last Fill Quantity: 15,  # refills: 0   Last office visit: 12/14/2018 with prescribing provider:  Shaq   Future Office Visit:        Short Acting Insulin Protocol Passed - 12/27/2018  2:50 PM       Passed - " "Blood pressure less than 140/90 in past 6 months    BP Readings from Last 3 Encounters:   12/14/18 137/84   11/14/18 136/82   10/16/18 143/83                Passed - LDL on file in past 12 months    Recent Labs   Lab Test 09/12/18  1157   *            Passed - Microalbumin on file in past 12 months    Recent Labs   Lab Test 01/09/18  1122   MICROL 6   UMALCR 7.11            Passed - Serum creatinine on file in past 12 months    Recent Labs   Lab Test 09/12/18  1157   CR 0.56*            Passed - HgbA1C in past 3 or 6 months    If HgbA1C is 8 or greater, it needs to be on file within the past 3 months.  If less than 8, must be on file within the past 6 months.     Recent Labs   Lab Test 09/12/18  1157   A1C 7.7*            Passed - Patient is age 18 or older       Passed - Recent (6 mo) or future (30 days) visit within the authorizing provider's specialty    Patient had office visit in the last 6 months or has a visit in the next 30 days with authorizing provider or within the authorizing provider's specialty.  See \"Patient Info\" tab in inbasket, or \"Choose Columns\" in Meds & Orders section of the refill encounter.            insulin glargine (BASAGLAR KWIKPEN) 100 UNIT/ML pen [Pharmacy Med Name: BASAGLAR 100 U/ML KWIKPEN INJ 3ML]  0     Sig: INJECT 49 UNITS SUBCUTANEOUS AT BEDTIME  Last Written Prescription Date:  9/13/18  Last Fill Quantity: 30,  # refills: 1   Last office visit: 12/14/2018 with prescribing provider:  Shaq   Future Office Visit:        Long Acting Insulin Protocol Passed - 12/27/2018  2:50 PM       Passed - Blood pressure less than 140/90 in past 6 months    BP Readings from Last 3 Encounters:   12/14/18 137/84   11/14/18 136/82   10/16/18 143/83                Passed - LDL on file in past 12 months    Recent Labs   Lab Test 09/12/18  1157   *            Passed - Microalbumin on file in past 12 months    Recent Labs   Lab Test 01/09/18  1122   MICROL 6   UMALCR 7.11            Passed " "- Serum creatinine on file in past 12 months    Recent Labs   Lab Test 09/12/18  1157   CR 0.56*            Passed - HgbA1C in past 3 or 6 months    If HgbA1C is 8 or greater, it needs to be on file within the past 3 months.  If less than 8, must be on file within the past 6 months.     Recent Labs   Lab Test 09/12/18  1157   A1C 7.7*            Passed - Patient is age 18 or older       Passed - Recent (6 mo) or future (30 days) visit within the authorizing provider's specialty    Patient had office visit in the last 6 months or has a visit in the next 30 days with authorizing provider or within the authorizing provider's specialty.  See \"Patient Info\" tab in inbasket, or \"Choose Columns\" in Meds & Orders section of the refill encounter.            polyethylene glycol (MIRALAX/GLYCOLAX) powder [Pharmacy Med Name: POLYETH GLYCOL 3350 NF POWDER 527GM] 527 g 0     Sig: TAKE 17 GRAMS BY MOUTH EVERY DAY  Last Written Prescription Date:  11/5/18  Last Fill Quantity: 527,  # refills: 3   Last office visit: 12/14/2018 with prescribing provider:  Shaq   Future Office Visit:        Laxatives Protocol Passed - 12/27/2018  2:50 PM       Passed - Patient is age 6 or older       Passed - Recent (12 mo) or future (30 days) visit within the authorizing provider's specialty    Patient had office visit in the last 12 months or has a visit in the next 30 days with authorizing provider or within the authorizing provider's specialty.  See \"Patient Info\" tab in inbasket, or \"Choose Columns\" in Meds & Orders section of the refill encounter.              Prescription approved per [facility] Refill Protocol.    Rx denied for polyethylene glycol since an rx was sent on 11/5/18 for 527 grams with 3 refills.    Patricia WHITTAKER RN  EP Triage    "

## 2019-01-04 ENCOUNTER — TRANSFERRED RECORDS (OUTPATIENT)
Dept: HEALTH INFORMATION MANAGEMENT | Facility: CLINIC | Age: 64
End: 2019-01-04

## 2019-01-07 PROCEDURE — 82274 ASSAY TEST FOR BLOOD FECAL: CPT | Performed by: FAMILY MEDICINE

## 2019-01-08 ENCOUNTER — HOSPITAL PATHOLOGY (OUTPATIENT)
Dept: OTHER | Facility: CLINIC | Age: 64
End: 2019-01-08

## 2019-01-10 LAB — COPATH REPORT: NORMAL

## 2019-01-12 LAB — HEMOCCULT STL QL IA: NEGATIVE

## 2019-01-14 ENCOUNTER — OFFICE VISIT (OUTPATIENT)
Dept: FAMILY MEDICINE | Facility: CLINIC | Age: 64
End: 2019-01-14
Payer: COMMERCIAL

## 2019-01-14 VITALS
SYSTOLIC BLOOD PRESSURE: 138 MMHG | HEIGHT: 78 IN | HEART RATE: 100 BPM | DIASTOLIC BLOOD PRESSURE: 85 MMHG | OXYGEN SATURATION: 97 % | BODY MASS INDEX: 36.45 KG/M2 | TEMPERATURE: 96.7 F | WEIGHT: 315 LBS

## 2019-01-14 DIAGNOSIS — Z12.11 SCREEN FOR COLON CANCER: ICD-10-CM

## 2019-01-14 DIAGNOSIS — E11.65 TYPE 2 DIABETES MELLITUS WITH HYPERGLYCEMIA, UNSPECIFIED WHETHER LONG TERM INSULIN USE (H): Primary | ICD-10-CM

## 2019-01-14 DIAGNOSIS — Z13.6 CARDIOVASCULAR SCREENING; LDL GOAL LESS THAN 130: ICD-10-CM

## 2019-01-14 DIAGNOSIS — F19.20 CHEMICAL DEPENDENCY (H): ICD-10-CM

## 2019-01-14 DIAGNOSIS — M17.10 PRIMARY LOCALIZED OSTEOARTHROSIS OF LOWER LEG, UNSPECIFIED LATERALITY: ICD-10-CM

## 2019-01-14 DIAGNOSIS — E66.01 MORBID OBESITY WITH BMI OF 45.0-49.9, ADULT (H): ICD-10-CM

## 2019-01-14 LAB — HBA1C MFR BLD: 7 % (ref 0–5.6)

## 2019-01-14 PROCEDURE — 80053 COMPREHEN METABOLIC PANEL: CPT | Performed by: FAMILY MEDICINE

## 2019-01-14 PROCEDURE — 99214 OFFICE O/P EST MOD 30 MIN: CPT | Performed by: FAMILY MEDICINE

## 2019-01-14 PROCEDURE — 80061 LIPID PANEL: CPT | Performed by: FAMILY MEDICINE

## 2019-01-14 PROCEDURE — 83036 HEMOGLOBIN GLYCOSYLATED A1C: CPT | Performed by: FAMILY MEDICINE

## 2019-01-14 PROCEDURE — 36415 COLL VENOUS BLD VENIPUNCTURE: CPT | Performed by: FAMILY MEDICINE

## 2019-01-14 RX ORDER — MORPHINE SULFATE 15 MG/1
7.5 TABLET ORAL EVERY 8 HOURS PRN
Qty: 45 TABLET | Refills: 0 | Status: SHIPPED | OUTPATIENT
Start: 2019-01-14 | End: 2019-02-13

## 2019-01-14 ASSESSMENT — MIFFLIN-ST. JEOR: SCORE: 2693.22

## 2019-01-14 NOTE — PROGRESS NOTES
SUBJECTIVE:   Michael Colorado is a 63 year old male who presents to clinic today for the following health issues:      Diabetes Follow-up      Patient is checking blood sugars: once or twice     Diabetic concerns: None     Symptoms of hypoglycemia (low blood sugar): none     Paresthesias (numbness or burning in feet) or sores: Yes - bilateral feet      Date of last diabetic eye exam: Jan 4th 2019    BP Readings from Last 2 Encounters:   12/14/18 137/84   11/14/18 136/82     Hemoglobin A1C (%)   Date Value   09/12/2018 7.7 (H)   04/06/2018 6.8 (H)     LDL Cholesterol Calculated (mg/dL)   Date Value   01/09/2018 80   12/03/2015 71     LDL Cholesterol Direct (mg/dL)   Date Value   09/12/2018 114 (H)       Diabetes Management Resources    Hypertension Follow-up      Outpatient blood pressures are not being checked.    Low Salt Diet: low salt      Amount of exercise or physical activity: a few days a week for about 30 min(s)     Problems taking medications regularly: No    Medication side effects: none    Diet: low salt and low fat/cholesterol      Problem list and histories reviewed & adjusted, as indicated.  Additional history: as documented    Patient Active Problem List   Diagnosis     Other and unspecified disc disorder     Tinnitus     CARDIOVASCULAR SCREENING; LDL GOAL LESS THAN 130     Advanced directives, counseling/discussion     Primary localized osteoarthrosis, lower leg     Smoker     Morbid obesity with BMI of 45.0-49.9, adult (H)     Morbid obesity (H)     Chronic pain syndrome     Hepatitis C     Obstructive sleep apnea     Tobacco use disorder     Type 2 diabetes mellitus with hyperglycemia (H)     Chemical dependency (H)     Hepatitis C, chronic (H)     Past Surgical History:   Procedure Laterality Date     LAPAROSCOPIC HERNIORRHAPHY VENTRAL N/A 3/13/2018    Procedure: LAPAROSCOPIC HERNIORRHAPHY VENTRAL;  LAPAROSCOPIC VENTRAL HERNIA REPAIR WITH MESH  ;  Surgeon: Jairo Angel MD;  Location: [facility]      ORTHOPEDIC SURGERY Bilateral     ARTHROSCOPY - L X 1, R X 2     SURGICAL HISTORY OF -   10/03    Lt knee partial medial meniscectomy       Social History     Tobacco Use     Smoking status: Current Every Day Smoker     Packs/day: 1.00     Years: 30.00     Pack years: 30.00     Types: Cigarettes     Smokeless tobacco: Never Used   Substance Use Topics     Alcohol use: No     Comment: quit      Family History   Problem Relation Age of Onset     Diabetes Brother          Current Outpatient Medications   Medication Sig Dispense Refill     aspirin 81 MG tablet Take by mouth daily       BASAGLAR 100 UNIT/ML injection Inject 52 Units Subcutaneous At Bedtime 30 mL 1     blood glucose monitoring (NO BRAND SPECIFIED) test strip Use to test blood sugars 4 times daily or as directed please provide per formulary approval patient states Verio. 100 strip 3     blood glucose monitoring (ONE TOUCH DELICA) lancets Use to test blood sugar 3 times daily or as directed.  Ok to substitute alternative if insurance prefers. 100 each 6     blood glucose monitoring (ONETOUCH VERIO IQ) test strip Use to test blood sugars 3 times daily or as directed. 100 each 6     insulin glargine (BASAGLAR KWIKPEN) 100 UNIT/ML pen INJECT 49 UNITS SUBCUTANEOUS AT BEDTIME 30 mL 0     insulin pen needle 32G X 4 MM Use 4 pen needles daily or as directed. 150 each 3     metFORMIN (GLUCOPHAGE) 1000 MG tablet Take 1 tablet (1,000 mg) by mouth 2 times daily (with meals) 180 tablet 1     morphine (MSIR) 15 MG IR tablet Take 0.5 tablets (7.5 mg) by mouth every 8 hours as needed for severe pain 45 tablet 0     NOVOLOG FLEXPEN 100 UNIT/ML soln SEE NOTES 15 mL 0     olopatadine (PATANOL) 0.1 % ophthalmic solution Place 1 drop into both eyes 2 times daily 9 mL 3     polyethylene glycol (MIRALAX/GLYCOLAX) powder TAKE 17 GRAMS(1 CAPFUL) BY MOUTH DAILY 527 g 3     STATIN NOT PRESCRIBED, INTENTIONAL, 1 each daily Please choose reason not prescribed, below       VICTOZA PEN  "18 MG/3ML soln START WITH INJECTING 0.6MG DAILY X 1 WEEK, THEN INCREASE TO 1.2MG DAILY 6 mL 0     Allergies   Allergen Reactions     No Known Drug Allergies      Recent Labs   Lab Test 09/12/18  1157 04/06/18  1158  01/09/18  1122  12/03/15  1021  02/05/15  0837 04/29/13  1138   A1C 7.7* 6.8*  --  11.1*   < > 6.7*   < > 7.0* 6.4*   *  --   --  80  --  71  --  77 112   HDL  --   --   --  30*  --  28*  --  32* 35*   TRIG  --   --   --  368*  --  312*  --  287* 218*   ALT 30 18  --  25   < >  --   --  42 50   CR 0.56* 0.64*   < > 0.58*   < > 0.60*   < > 0.64* 0.60*   GFRESTIMATED >90 >90   < > >90   < > >90  Non  GFR Calc     < > >90  Non  GFR Calc   >90   GFRESTBLACK >90 >90   < > >90   < > >90  African American GFR Calc     < > >90   GFR Calc   >90   POTASSIUM 4.2 4.2   < > 4.3   < > 3.9   < > 4.0 4.1   TSH  --   --   --  2.20  --   --   --   --  1.25    < > = values in this interval not displayed.      BP Readings from Last 3 Encounters:   01/14/19 138/85   12/14/18 137/84   11/14/18 136/82    Wt Readings from Last 3 Encounters:   01/14/19 (!) 170.7 kg (376 lb 6.4 oz)   12/14/18 (!) 171.9 kg (379 lb)   11/14/18 (!) 167.8 kg (370 lb)                    Reviewed and updated as needed this visit by clinical staff  Tobacco  Allergies  Med Hx  Surg Hx  Fam Hx  Soc Hx      Reviewed and updated as needed this visit by Provider         ROS:  Constitutional, HEENT, cardiovascular, pulmonary, gi and gu systems are negative, except as otherwise noted.    OBJECTIVE:     /85   Pulse 100   Temp 96.7  F (35.9  C)   Ht 2.057 m (6' 9\")   Wt (!) 170.7 kg (376 lb 6.4 oz)   SpO2 97%   BMI 40.34 kg/m    Body mass index is 40.34 kg/m .  GENERAL: healthy, alert and no distress  NECK: no adenopathy, no asymmetry, masses, or scars and thyroid normal to palpation  RESP: lungs clear to auscultation - no rales, rhonchi or wheezes  CV: regular rate and rhythm, normal S1 S2, " no S3 or S4, no murmur, click or rub, no peripheral edema and peripheral pulses strong  ABDOMEN: soft, nontender, no hepatosplenomegaly, no masses and bowel sounds normal  MS: no gross musculoskeletal defects noted, no edema        ASSESSMENT/PLAN:   ASSESSMENT / PLAN:  (E11.65) Type 2 diabetes mellitus with hyperglycemia, unspecified whether long term insulin use (H)  (primary encounter diagnosis)  Comment: has been working on life style modification   Will recheck lab for further evaluation   Plan: Hemoglobin A1c, Comprehensive metabolic panel,         Lipid panel reflex to direct LDL Fasting            (Z13.6) CARDIOVASCULAR SCREENING; LDL GOAL LESS THAN 130  Comment: stable   Plan: Hemoglobin A1c, Comprehensive metabolic panel,         Lipid panel reflex to direct LDL Fasting            (E66.01,  Z68.42) Morbid obesity with BMI of 45.0-49.9, adult (H)  Comment: started working on life style modification and wt loss about 6 lbs  Plan: Hemoglobin A1c, Comprehensive metabolic panel,         Lipid panel reflex to direct LDL Fasting            (F19.20) Chemical dependency (H)  Comment: currently on tapering process of morphine,   His pain level elevated for last 1-2 months, will have him to keep on current dose of meds, and will resume the weaning process again in 1-2 months   Plan: morphine (MSIR) 15 MG IR tablet            (M17.10) Primary localized osteoarthrosis of lower leg, unspecified laterality  Comment: mentioned above   Plan: morphine (MSIR) 15 MG IR tablet              FUTURE APPOINTMENTS:       - Follow-up visit in 1 months     Edison New MD  [facility] [place]

## 2019-01-15 LAB
ALBUMIN SERPL-MCNC: 4 G/DL (ref 3.4–5)
ALP SERPL-CCNC: 91 U/L (ref 40–150)
ALT SERPL W P-5'-P-CCNC: 32 U/L (ref 0–70)
ANION GAP SERPL CALCULATED.3IONS-SCNC: 8 MMOL/L (ref 3–14)
AST SERPL W P-5'-P-CCNC: 17 U/L (ref 0–45)
BILIRUB SERPL-MCNC: 0.6 MG/DL (ref 0.2–1.3)
BUN SERPL-MCNC: 12 MG/DL (ref 7–30)
CALCIUM SERPL-MCNC: 9.4 MG/DL (ref 8.5–10.1)
CHLORIDE SERPL-SCNC: 101 MMOL/L (ref 94–109)
CHOLEST SERPL-MCNC: 200 MG/DL
CO2 SERPL-SCNC: 25 MMOL/L (ref 20–32)
CREAT SERPL-MCNC: 0.73 MG/DL (ref 0.66–1.25)
GFR SERPL CREATININE-BSD FRML MDRD: >90 ML/MIN/{1.73_M2}
GLUCOSE SERPL-MCNC: 169 MG/DL (ref 70–99)
HDLC SERPL-MCNC: 29 MG/DL
LDLC SERPL CALC-MCNC: 103 MG/DL
NONHDLC SERPL-MCNC: 171 MG/DL
POTASSIUM SERPL-SCNC: 4.3 MMOL/L (ref 3.4–5.3)
PROT SERPL-MCNC: 8.3 G/DL (ref 6.8–8.8)
SODIUM SERPL-SCNC: 134 MMOL/L (ref 133–144)
TRIGL SERPL-MCNC: 338 MG/DL

## 2019-02-13 ENCOUNTER — OFFICE VISIT (OUTPATIENT)
Dept: FAMILY MEDICINE | Facility: CLINIC | Age: 64
End: 2019-02-13
Payer: COMMERCIAL

## 2019-02-13 VITALS — SYSTOLIC BLOOD PRESSURE: 122 MMHG | DIASTOLIC BLOOD PRESSURE: 66 MMHG

## 2019-02-13 DIAGNOSIS — Z79.4 TYPE 2 DIABETES MELLITUS WITH HYPERGLYCEMIA, WITH LONG-TERM CURRENT USE OF INSULIN (H): ICD-10-CM

## 2019-02-13 DIAGNOSIS — K59.01 SLOW TRANSIT CONSTIPATION: ICD-10-CM

## 2019-02-13 DIAGNOSIS — F19.20 CHEMICAL DEPENDENCY (H): ICD-10-CM

## 2019-02-13 DIAGNOSIS — H93.13 TINNITUS, BILATERAL: Primary | ICD-10-CM

## 2019-02-13 DIAGNOSIS — E11.65 TYPE 2 DIABETES MELLITUS WITH HYPERGLYCEMIA, WITH LONG-TERM CURRENT USE OF INSULIN (H): ICD-10-CM

## 2019-02-13 DIAGNOSIS — M17.10 PRIMARY LOCALIZED OSTEOARTHROSIS OF LOWER LEG, UNSPECIFIED LATERALITY: ICD-10-CM

## 2019-02-13 DIAGNOSIS — E11.65 POORLY CONTROLLED TYPE 2 DIABETES MELLITUS (H): ICD-10-CM

## 2019-02-13 PROCEDURE — 99214 OFFICE O/P EST MOD 30 MIN: CPT | Performed by: FAMILY MEDICINE

## 2019-02-13 RX ORDER — INSULIN GLARGINE 100 [IU]/ML
49 INJECTION, SOLUTION SUBCUTANEOUS AT BEDTIME
Qty: 30 ML | Refills: 0 | Status: SHIPPED | OUTPATIENT
Start: 2019-02-13 | End: 2019-04-30

## 2019-02-13 RX ORDER — LIRAGLUTIDE 6 MG/ML
INJECTION SUBCUTANEOUS
Qty: 6 ML | Refills: 3 | Status: SHIPPED | OUTPATIENT
Start: 2019-02-13 | End: 2019-04-12

## 2019-02-13 RX ORDER — MORPHINE SULFATE 15 MG/1
7.5 TABLET ORAL EVERY 8 HOURS PRN
Qty: 45 TABLET | Refills: 0 | Status: SHIPPED | OUTPATIENT
Start: 2019-02-13 | End: 2019-03-14

## 2019-02-13 RX ORDER — POLYETHYLENE GLYCOL 3350 17 G/17G
POWDER, FOR SOLUTION ORAL
Qty: 527 G | Refills: 3 | Status: SHIPPED | OUTPATIENT
Start: 2019-02-13

## 2019-02-13 ASSESSMENT — PATIENT HEALTH QUESTIONNAIRE - PHQ9: SUM OF ALL RESPONSES TO PHQ QUESTIONS 1-9: 9

## 2019-02-13 NOTE — PROGRESS NOTES
SUBJECTIVE:   Michael Colorado is a 64 year old male who presents to clinic today for the following health issues:      Medication Followup of Morphine     Taking Medication as prescribed: yes    Side Effects:  Constipation     Medication Helping Symptoms:  yes       Ear Problem Follow Up      Duration: 2-3 years   Description (location/character/radiation): left ear problem follow up       Problem list and histories reviewed & adjusted, as indicated.  Additional history: as documented    Patient Active Problem List   Diagnosis     Other and unspecified disc disorder     Tinnitus     CARDIOVASCULAR SCREENING; LDL GOAL LESS THAN 130     Advanced directives, counseling/discussion     Primary localized osteoarthrosis, lower leg     Smoker     Morbid obesity with BMI of 45.0-49.9, adult (H)     Morbid obesity (H)     Chronic pain syndrome     Hepatitis C     Obstructive sleep apnea     Tobacco use disorder     Type 2 diabetes mellitus with hyperglycemia (H)     Chemical dependency (H)     Hepatitis C, chronic (H)     Past Surgical History:   Procedure Laterality Date     LAPAROSCOPIC HERNIORRHAPHY VENTRAL N/A 3/13/2018    Procedure: LAPAROSCOPIC HERNIORRHAPHY VENTRAL;  LAPAROSCOPIC VENTRAL HERNIA REPAIR WITH MESH  ;  Surgeon: Jairo Angel MD;  [facility]     ORTHOPEDIC SURGERY Bilateral     ARTHROSCOPY - L X 1, R X 2     SURGICAL HISTORY OF -   10/03    Lt knee partial medial meniscectomy       Social History     Tobacco Use     Smoking status: Current Every Day Smoker     Packs/day: 1.00     Years: 30.00     Pack years: 30.00     Types: Cigarettes     Smokeless tobacco: Never Used   Substance Use Topics     Alcohol use: No     Comment: quit      Family History   Problem Relation Age of Onset     Diabetes Brother          Current Outpatient Medications   Medication Sig Dispense Refill     aspirin 81 MG tablet Take by mouth daily       BASAGLAR 100 UNIT/ML injection Inject 52 Units Subcutaneous At Bedtime 30 mL 1      blood glucose monitoring (ONE TOUCH DELICA) lancets Use to test blood sugar 3 times daily or as directed.  Ok to substitute alternative if insurance prefers. 100 each 6     blood glucose monitoring (ONETOUCH VERIO IQ) test strip Use to test blood sugars 3 times daily or as directed. 100 each 6     insulin glargine (BASAGLAR KWIKPEN) 100 UNIT/ML pen Inject 49 Units Subcutaneous At Bedtime 30 mL 0     insulin pen needle 32G X 4 MM Use 4 pen needles daily or as directed. 150 each 3     liraglutide (VICTOZA PEN) 18 MG/3ML solution START WITH INJECTING 0.6MG DAILY X 1 WEEK, THEN INCREASE TO 1.2MG DAILY 6 mL 3     metFORMIN (GLUCOPHAGE) 1000 MG tablet Take 1 tablet (1,000 mg) by mouth 2 times daily (with meals) 180 tablet 1     morphine (MSIR) 15 MG IR tablet Take 0.5 tablets (7.5 mg) by mouth every 8 hours as needed for severe pain 45 tablet 0     NOVOLOG FLEXPEN 100 UNIT/ML soln SEE NOTES 15 mL 0     polyethylene glycol (MIRALAX/GLYCOLAX) powder TAKE 17 GRAMS(1 CAPFUL) BY MOUTH DAILY 527 g 3     blood glucose monitoring (NO BRAND SPECIFIED) test strip Use to test blood sugars 4 times daily or as directed please provide per formulary approval patient states Verio. (Patient not taking: Reported on 2/13/2019) 100 strip 3     STATIN NOT PRESCRIBED, INTENTIONAL, 1 each daily Please choose reason not prescribed, below (Patient not taking: Reported on 2/13/2019)       Allergies   Allergen Reactions     No Known Drug Allergies      Recent Labs   Lab Test 01/14/19  1212 09/12/18  1157 04/06/18  1158  01/09/18  1122  12/03/15  1021  04/29/13  1138   A1C 7.0* 7.7* 6.8*  --  11.1*   < > 6.7*   < > 6.4*   * 114*  --   --  80  --  71   < > 112   HDL 29*  --   --   --  30*  --  28*   < > 35*   TRIG 338*  --   --   --  368*  --  312*   < > 218*   ALT 32 30 18  --  25   < >  --    < > 50   CR 0.73 0.56* 0.64*   < > 0.58*   < > 0.60*   < > 0.60*   GFRESTIMATED >90 >90 >90   < > >90   < > >90  Non  GFR Calc      < > >90   GFRESTBLACK >90 >90 >90   < > >90   < > >90  African American GFR Calc     < > >90   POTASSIUM 4.3 4.2 4.2   < > 4.3   < > 3.9   < > 4.1   TSH  --   --   --   --  2.20  --   --   --  1.25    < > = values in this interval not displayed.      BP Readings from Last 3 Encounters:   02/13/19 122/66   01/14/19 138/85   12/14/18 137/84    Wt Readings from Last 3 Encounters:   01/14/19 (!) 170.7 kg (376 lb 6.4 oz)   12/14/18 (!) 171.9 kg (379 lb)   11/14/18 (!) 167.8 kg (370 lb)                    Reviewed and updated as needed this visit by clinical staff       Reviewed and updated as needed this visit by Provider         ROS:  Constitutional, HEENT, cardiovascular, pulmonary, gi and gu systems are negative, except as otherwise noted.    OBJECTIVE:     /66 (Cuff Size: Adult Large)   There is no height or weight on file to calculate BMI.  GENERAL: healthy, alert and no distress  NECK: no adenopathy, no asymmetry, masses, or scars and thyroid normal to palpation  RESP: lungs clear to auscultation - no rales, rhonchi or wheezes  CV: regular rate and rhythm, normal S1 S2, no S3 or S4, no murmur, click or rub, no peripheral edema and peripheral pulses strong  ABDOMEN: soft, nontender, no hepatosplenomegaly, no masses and bowel sounds normal  MS: no gross musculoskeletal defects noted, no edema        ASSESSMENT/PLAN:   ASSESSMENT / PLAN:  (H93.13) Tinnitus, bilateral  (primary encounter diagnosis)  Comment: worsening bilateral tinnitus without other focal neurologic sign, will have him to check with ENT  Plan: OTOLARYNGOLOGY REFERRAL            (F19.20) Chemical dependency (H)  Comment: has been stable with current dose of meds, has no sign of overdosing nor using other chemicals, will keep watching sx   Plan: morphine (MSIR) 15 MG IR tablet            (M17.10) Primary localized osteoarthrosis of lower leg, unspecified laterality  Comment: mentioned above   Plan: morphine (MSIR) 15 MG IR tablet             (E11.65,  Z79.4) Type 2 diabetes mellitus with hyperglycemia, with long-term current use of insulin (H)  Comment: has been stable   Plan: insulin glargine (BASAGLAR KWIKPEN) 100 UNIT/ML        pen            (E11.65) Poorly controlled type 2 diabetes mellitus (H)  Comment: mentioned above   Plan: liraglutide (VICTOZA PEN) 18 MG/3ML solution            (K59.01) Slow transit constipation  Comment: stable with current dose of meds, will keep watching sx   Plan: polyethylene glycol (MIRALAX/GLYCOLAX) powder            FUTURE APPOINTMENTS:       - Follow-up visit in 1 month for med check     Edison New MD  [facility] [place]

## 2019-03-05 DIAGNOSIS — Z79.4 TYPE 2 DIABETES MELLITUS WITH HYPERGLYCEMIA, WITH LONG-TERM CURRENT USE OF INSULIN (H): ICD-10-CM

## 2019-03-05 DIAGNOSIS — E11.65 TYPE 2 DIABETES MELLITUS WITH HYPERGLYCEMIA, WITH LONG-TERM CURRENT USE OF INSULIN (H): ICD-10-CM

## 2019-03-05 DIAGNOSIS — E11.65 POORLY CONTROLLED TYPE 2 DIABETES MELLITUS (H): ICD-10-CM

## 2019-03-05 NOTE — TELEPHONE ENCOUNTER
"Requested Prescriptions   Pending Prescriptions Disp Refills     polyethylene glycol (MIRALAX/GLYCOLAX) packet [Pharmacy Med Name: POLYETHYLENE GLYCOL 3350POW 14S]  0    Last Written Prescription Date:  2/13/19  Last Fill Quantity: 527gr,  # refills: 3   Last office visit: 2/13/2019 with prescribing provider:     Future Office Visit:     Sig: TAKE CONTENTS OF 1 PACKET( 17 GRAMS) MIXED IN LIQUID EVERY DAY    Laxatives Protocol Passed - 3/5/2019  3:11 PM       Passed - Patient is age 6 or older       Passed - Recent (12 mo) or future (30 days) visit within the authorizing provider's specialty    Patient had office visit in the last 12 months or has a visit in the next 30 days with authorizing provider or within the authorizing provider's specialty.  See \"Patient Info\" tab in inbasket, or \"Choose Columns\" in Meds & Orders section of the refill encounter.             Passed - Medication is active on med list        VICTOZA PEN 18 MG/3ML soln [Pharmacy Med Name: VICTOZA 18MG/3ML INJ PEN 3ML] 6 mL 0    Last Written Prescription Date:  2/13/19  Last Fill Quantity: 6ml,  # refills: 3   Last office visit: 2/13/2019 with prescribing provider:     Future Office Visit:     Sig: START WITH INJECTING 0.6MG DAILY X 1 WEEK, THEN INCREASE TO 1.2MG DAILY    GLP-1 Agonists Protocol Passed - 3/5/2019  3:11 PM       Passed - Blood pressure less than 140/90 in past 6 months    BP Readings from Last 3 Encounters:   02/13/19 122/66   01/14/19 138/85   12/14/18 137/84                Passed - LDL on file in past 12 months    Recent Labs   Lab Test 01/14/19  1212   *            Passed - Microalbumin on file in past 12 months    Recent Labs   Lab Test 01/09/18  1122   MICROL 6   UMALCR 7.11            Passed - HgbA1C in past 3 or 6 months    If HgbA1C is 8 or greater, it needs to be on file within the past 3 months.  If less than 8, must be on file within the past 6 months.     Recent Labs   Lab Test 01/14/19  1212   A1C 7.0*            " "Passed - Medication is active on med list       Passed - Patient is age 18 or older       Passed - Normal serum creatinine on file in past 12 months    Recent Labs   Lab Test 01/14/19  1212   CR 0.73            Passed - Recent (6 mo) or future (30 days) visit within the authorizing provider's specialty    Patient had office visit in the last 6 months or has a visit in the next 30 days with authorizing provider.  See \"Patient Info\" tab in inbasket, or \"Choose Columns\" in Meds & Orders section of the refill encounter.            blood glucose (ONETOUCH VERIO IQ) test strip [Pharmacy Med Name: ONE TOUCH VERIO TEST ST(NEW)100'S] 100 strip 0    Last Written Prescription Date:  1/12/18  Last Fill Quantity: 100,  # refills: 1   Last office visit: 2/13/2019 with prescribing provider:     Future Office Visit:     Sig: USE TO TEST THREE TIMES DAILY OR AS DIRECTED    Diabetic Supplies Protocol Passed - 3/5/2019  3:11 PM       Passed - Medication is active on med list       Passed - Patient is 18 years of age or older       Passed - Recent (6 mo) or future (30 days) visit within the authorizing provider's specialty    Patient had office visit in the last 6 months or has a visit in the next 30 days with authorizing provider.  See \"Patient Info\" tab in inbasket, or \"Choose Columns\" in Meds & Orders section of the refill encounter.              "

## 2019-03-06 RX ORDER — POLYETHYLENE GLYCOL 3350 17 G/17G
POWDER, FOR SOLUTION ORAL
Refills: 0 | OUTPATIENT
Start: 2019-03-06

## 2019-03-06 RX ORDER — LIRAGLUTIDE 6 MG/ML
INJECTION SUBCUTANEOUS
Qty: 6 ML | Refills: 0 | Status: SHIPPED | OUTPATIENT
Start: 2019-03-06

## 2019-03-06 NOTE — TELEPHONE ENCOUNTER
Rx for polyethylene glycol packets denied since powder 527 grams with 3 refills was sent on 2/13/19.    Prescription approved per [facility] Refill Protocol for blood glucose test strips and victoza pen.    Patricia WHITTAKER RN  EP Triage

## 2019-03-08 ENCOUNTER — TRANSFERRED RECORDS (OUTPATIENT)
Dept: HEALTH INFORMATION MANAGEMENT | Facility: CLINIC | Age: 64
End: 2019-03-08

## 2019-03-14 ENCOUNTER — OFFICE VISIT (OUTPATIENT)
Dept: FAMILY MEDICINE | Facility: CLINIC | Age: 64
End: 2019-03-14
Payer: COMMERCIAL

## 2019-03-14 VITALS
DIASTOLIC BLOOD PRESSURE: 76 MMHG | OXYGEN SATURATION: 99 % | SYSTOLIC BLOOD PRESSURE: 122 MMHG | HEART RATE: 84 BPM | TEMPERATURE: 97.3 F | RESPIRATION RATE: 14 BRPM | HEIGHT: 78 IN | BODY MASS INDEX: 36.45 KG/M2 | WEIGHT: 315 LBS

## 2019-03-14 DIAGNOSIS — E11.65 TYPE 2 DIABETES MELLITUS WITH HYPERGLYCEMIA, UNSPECIFIED WHETHER LONG TERM INSULIN USE (H): Primary | ICD-10-CM

## 2019-03-14 DIAGNOSIS — M17.10 PRIMARY LOCALIZED OSTEOARTHROSIS OF LOWER LEG, UNSPECIFIED LATERALITY: ICD-10-CM

## 2019-03-14 DIAGNOSIS — F19.20 CHEMICAL DEPENDENCY (H): ICD-10-CM

## 2019-03-14 PROCEDURE — 99214 OFFICE O/P EST MOD 30 MIN: CPT | Performed by: FAMILY MEDICINE

## 2019-03-14 PROCEDURE — 99207 C FOOT EXAM  NO CHARGE: CPT | Performed by: FAMILY MEDICINE

## 2019-03-14 RX ORDER — MORPHINE SULFATE 15 MG/1
7.5 TABLET ORAL EVERY 8 HOURS PRN
Qty: 40 TABLET | Refills: 0 | Status: SHIPPED | OUTPATIENT
Start: 2019-03-15 | End: 2019-04-12

## 2019-03-14 ASSESSMENT — MIFFLIN-ST. JEOR: SCORE: 2676.19

## 2019-03-14 NOTE — PROGRESS NOTES
SUBJECTIVE:   Michael Colorado is a 64 year old male who presents to clinic today for the following health issues:      Derm Problem         Description (location/character/radiation): growth inside of nose. Seen by an ENT last Friday.          Medication Followup of Morphine     Taking Medication as prescribed: yes    Side Effects:  None    Medication Helping Symptoms:  yes       Problem list and histories reviewed & adjusted, as indicated.  Additional history: as documented    Patient Active Problem List   Diagnosis     Other and unspecified disc disorder     Tinnitus     CARDIOVASCULAR SCREENING; LDL GOAL LESS THAN 130     Advanced directives, counseling/discussion     Primary localized osteoarthrosis, lower leg     Smoker     Morbid obesity with BMI of 45.0-49.9, adult (H)     Morbid obesity (H)     Chronic pain syndrome     Hepatitis C     Obstructive sleep apnea     Tobacco use disorder     Type 2 diabetes mellitus with hyperglycemia (H)     Chemical dependency (H)     Hepatitis C, chronic (H)     Past Surgical History:   Procedure Laterality Date     LAPAROSCOPIC HERNIORRHAPHY VENTRAL N/A 3/13/2018    Procedure: LAPAROSCOPIC HERNIORRHAPHY VENTRAL;  LAPAROSCOPIC VENTRAL HERNIA REPAIR WITH MESH  ;  Surgeon: Jairo Angel MD;  Location: [facility]     ORTHOPEDIC SURGERY Bilateral     ARTHROSCOPY - L X 1, R X 2     SURGICAL HISTORY OF -   10/03    Lt knee partial medial meniscectomy       Social History     Tobacco Use     Smoking status: Current Every Day Smoker     Packs/day: 1.00     Years: 30.00     Pack years: 30.00     Types: Cigarettes     Smokeless tobacco: Never Used   Substance Use Topics     Alcohol use: No     Comment: quit      Family History   Problem Relation Age of Onset     Diabetes Brother          Current Outpatient Medications   Medication Sig Dispense Refill     aspirin 81 MG tablet Take by mouth daily       BASAGLAR 100 UNIT/ML injection Inject 52 Units Subcutaneous At Bedtime 30 mL 1     blood  glucose (ONETOUCH VERIO IQ) test strip USE TO TEST THREE TIMES DAILY OR AS DIRECTED 100 strip 3     blood glucose monitoring (ONE TOUCH DELICA) lancets Use to test blood sugar 3 times daily or as directed.  Ok to substitute alternative if insurance prefers. 100 each 6     insulin glargine (BASAGLAR KWIKPEN) 100 UNIT/ML pen Inject 49 Units Subcutaneous At Bedtime 30 mL 0     insulin pen needle 32G X 4 MM Use 4 pen needles daily or as directed. 150 each 3     liraglutide (VICTOZA PEN) 18 MG/3ML solution START WITH INJECTING 0.6MG DAILY X 1 WEEK, THEN INCREASE TO 1.2MG DAILY 6 mL 3     metFORMIN (GLUCOPHAGE) 1000 MG tablet Take 1 tablet (1,000 mg) by mouth 2 times daily (with meals) 180 tablet 1     [START ON 3/15/2019] morphine (MSIR) 15 MG IR tablet Take 0.5 tablets (7.5 mg) by mouth every 8 hours as needed for severe pain 40 tablet 0     NOVOLOG FLEXPEN 100 UNIT/ML soln SEE NOTES 15 mL 0     polyethylene glycol (MIRALAX/GLYCOLAX) powder TAKE 17 GRAMS(1 CAPFUL) BY MOUTH DAILY 527 g 3     VICTOZA PEN 18 MG/3ML soln START WITH INJECTING 0.6MG DAILY X 1 WEEK, THEN INCREASE TO 1.2MG DAILY 6 mL 0     blood glucose monitoring (NO BRAND SPECIFIED) test strip Use to test blood sugars 4 times daily or as directed please provide per formulary approval patient states Verio. (Patient not taking: Reported on 2/13/2019) 100 strip 3     STATIN NOT PRESCRIBED, INTENTIONAL, 1 each daily Please choose reason not prescribed, below (Patient not taking: Reported on 3/14/2019)       Allergies   Allergen Reactions     No Known Drug Allergies      Recent Labs   Lab Test 01/14/19  1212 09/12/18  1157 04/06/18  1158  01/09/18  1122  12/03/15  1021  04/29/13  1138   A1C 7.0* 7.7* 6.8*  --  11.1*   < > 6.7*   < > 6.4*   * 114*  --   --  80  --  71   < > 112   HDL 29*  --   --   --  30*  --  28*   < > 35*   TRIG 338*  --   --   --  368*  --  312*   < > 218*   ALT 32 30 18  --  25   < >  --    < > 50   CR 0.73 0.56* 0.64*   < > 0.58*   < >  "0.60*   < > 0.60*   GFRESTIMATED >90 >90 >90   < > >90   < > >90  Non  GFR Calc     < > >90   GFRESTBLACK >90 >90 >90   < > >90   < > >90  African American GFR Calc     < > >90   POTASSIUM 4.3 4.2 4.2   < > 4.3   < > 3.9   < > 4.1   TSH  --   --   --   --  2.20  --   --   --  1.25    < > = values in this interval not displayed.      BP Readings from Last 3 Encounters:   03/14/19 122/76   02/13/19 122/66   01/14/19 138/85    Wt Readings from Last 3 Encounters:   03/14/19 (!) 171.9 kg (379 lb)   01/14/19 (!) 170.7 kg (376 lb 6.4 oz)   12/14/18 (!) 171.9 kg (379 lb)                    Reviewed and updated as needed this visit by clinical staff       Reviewed and updated as needed this visit by Provider         ROS:  Constitutional, HEENT, cardiovascular, pulmonary, gi and gu systems are negative, except as otherwise noted.    OBJECTIVE:     /76 (Cuff Size: Adult Large)   Pulse 84   Temp 97.3  F (36.3  C) (Tympanic)   Resp 14   Ht 2.019 m (6' 7.5\")   Wt (!) 171.9 kg (379 lb)   SpO2 99%   BMI 42.16 kg/m    Body mass index is 42.16 kg/m .  GENERAL: healthy, alert and no distress  NECK: no adenopathy, no asymmetry, masses, or scars and thyroid normal to palpation  RESP: lungs clear to auscultation - no rales, rhonchi or wheezes  CV: regular rate and rhythm, normal S1 S2, no S3 or S4, no murmur, click or rub, no peripheral edema and peripheral pulses strong  ABDOMEN: soft, nontender, no hepatosplenomegaly, no masses and bowel sounds normal  MS: no gross musculoskeletal defects noted, no edema        ASSESSMENT/PLAN:   ASSESSMENT / PLAN:  (E11.65) Type 2 diabetes mellitus with hyperglycemia, unspecified whether long term insulin use (H)  (primary encounter diagnosis)  Comment: stable, will recheck lab next month for f/u   Plan: FOOT EXAM            (F19.20) Chemical dependency (H)  Comment: stable, will have him to drop the dose from 45 tablets/months to 40 tablets/month  Plan: morphine (MSIR) 15 " MG IR tablet            (M17.10) Primary localized osteoarthrosis of lower leg, unspecified laterality  Comment: stable   Plan: morphine (MSIR) 15 MG IR tablet                FUTURE APPOINTMENTS:       - Follow-up visit in 1 months for DM check with a1c and microalbumin, and for med check      Edison New MD  [facility] [place]

## 2019-04-12 ENCOUNTER — OFFICE VISIT (OUTPATIENT)
Dept: FAMILY MEDICINE | Facility: CLINIC | Age: 64
End: 2019-04-12
Payer: COMMERCIAL

## 2019-04-12 VITALS
HEART RATE: 85 BPM | BODY MASS INDEX: 36.45 KG/M2 | WEIGHT: 315 LBS | SYSTOLIC BLOOD PRESSURE: 138 MMHG | OXYGEN SATURATION: 97 % | DIASTOLIC BLOOD PRESSURE: 88 MMHG | HEIGHT: 78 IN | TEMPERATURE: 97.2 F | RESPIRATION RATE: 18 BRPM

## 2019-04-12 DIAGNOSIS — E66.01 MORBID OBESITY WITH BMI OF 45.0-49.9, ADULT (H): Primary | ICD-10-CM

## 2019-04-12 DIAGNOSIS — Z79.4 TYPE 2 DIABETES MELLITUS WITH HYPERGLYCEMIA, WITH LONG-TERM CURRENT USE OF INSULIN (H): ICD-10-CM

## 2019-04-12 DIAGNOSIS — M17.10 PRIMARY LOCALIZED OSTEOARTHROSIS OF LOWER LEG, UNSPECIFIED LATERALITY: ICD-10-CM

## 2019-04-12 DIAGNOSIS — F19.20 CHEMICAL DEPENDENCY (H): ICD-10-CM

## 2019-04-12 DIAGNOSIS — E11.65 TYPE 2 DIABETES MELLITUS WITH HYPERGLYCEMIA, WITH LONG-TERM CURRENT USE OF INSULIN (H): ICD-10-CM

## 2019-04-12 LAB
ALT SERPL W P-5'-P-CCNC: 29 U/L (ref 0–70)
ANION GAP SERPL CALCULATED.3IONS-SCNC: 9 MMOL/L (ref 3–14)
BUN SERPL-MCNC: 8 MG/DL (ref 7–30)
CALCIUM SERPL-MCNC: 9.7 MG/DL (ref 8.5–10.1)
CHLORIDE SERPL-SCNC: 103 MMOL/L (ref 94–109)
CO2 SERPL-SCNC: 25 MMOL/L (ref 20–32)
CREAT SERPL-MCNC: 0.64 MG/DL (ref 0.66–1.25)
CREAT UR-MCNC: 82 MG/DL
GFR SERPL CREATININE-BSD FRML MDRD: >90 ML/MIN/{1.73_M2}
GLUCOSE SERPL-MCNC: 130 MG/DL (ref 70–99)
HBA1C MFR BLD: 7.5 % (ref 0–5.6)
MICROALBUMIN UR-MCNC: 11 MG/L
MICROALBUMIN/CREAT UR: 12.97 MG/G CR (ref 0–17)
POTASSIUM SERPL-SCNC: 4.4 MMOL/L (ref 3.4–5.3)
SODIUM SERPL-SCNC: 137 MMOL/L (ref 133–144)

## 2019-04-12 PROCEDURE — 80048 BASIC METABOLIC PNL TOTAL CA: CPT | Performed by: FAMILY MEDICINE

## 2019-04-12 PROCEDURE — 80307 DRUG TEST PRSMV CHEM ANLYZR: CPT | Mod: 90 | Performed by: FAMILY MEDICINE

## 2019-04-12 PROCEDURE — 83036 HEMOGLOBIN GLYCOSYLATED A1C: CPT | Performed by: FAMILY MEDICINE

## 2019-04-12 PROCEDURE — 82043 UR ALBUMIN QUANTITATIVE: CPT | Performed by: FAMILY MEDICINE

## 2019-04-12 PROCEDURE — 36415 COLL VENOUS BLD VENIPUNCTURE: CPT | Performed by: FAMILY MEDICINE

## 2019-04-12 PROCEDURE — 99214 OFFICE O/P EST MOD 30 MIN: CPT | Performed by: FAMILY MEDICINE

## 2019-04-12 PROCEDURE — 84460 ALANINE AMINO (ALT) (SGPT): CPT | Performed by: FAMILY MEDICINE

## 2019-04-12 PROCEDURE — 99000 SPECIMEN HANDLING OFFICE-LAB: CPT | Performed by: FAMILY MEDICINE

## 2019-04-12 RX ORDER — MORPHINE SULFATE 15 MG/1
7.5 TABLET ORAL EVERY 8 HOURS PRN
Qty: 40 TABLET | Refills: 0 | Status: SHIPPED | OUTPATIENT
Start: 2019-04-14 | End: 2019-05-14

## 2019-04-12 ASSESSMENT — MIFFLIN-ST. JEOR: SCORE: 2676.19

## 2019-04-12 NOTE — LETTER
[facility] [place]  04/12/19    Patient: Michael Colorado  YOB: 1955  Medical Record Number: [id number]                                                                  Opioid / Opioid Plus Controlled Substance Agreement    I understand that my care provider has prescribed an opioid (narcotic) controlled substance to help manage my condition(s). I am taking this medicine to help me function or work. I know this is strong medicine, and that it can cause serious side effects. Opioid medicine can be sedating, addicting and may cause a dependency on the drug. They can affect my ability to drive or think, and cause depression. They need to be taken exactly as prescribed. Combining opioids with certain medicines or chemicals (such as cocaine, sedatives and tranquilizers, sleeping pills, meth) can be dangerous or even fatal. Also, if I stop opioids suddenly, I may have severe withdrawal symptoms. Last, I understand that opioids do not work for all types of pain nor for all patients. If not helpful, I may be asked to stop them.        The risks, benefits, and side effects of these medicine(s) were explained to me. I agree that:    1. I will take part in other treatments as advised by my care team. This may be psychiatry or counseling, physical therapy, behavioral therapy, group treatment or a referral to a pain clinic. I will reduce or stop my medicine when my care team tells me to do so.  2. I will take my medicines as prescribed. I will not change the dose or schedule unless my care team tells me to. There will be no refills if I  run out early.   I may be contactedwithout warning and asked to complete a urine drug test or pill count at any time.   3. I will keep all my appointments, and understand this is part of the monitoring of opioids. My care team may require an office visit for EVERY opioid/controlled substance refill. If I miss appointments or don t follow instructions, my care team may stop  my medicine.  4. I will not ask other providers to prescribe controlled substances, and I will not accept controlled substances from other people. If I need another prescribed controlled substance for a new reason, I will tell my care team within 1 business day.  5. I will use one pharmacy to fill all of my controlled substance prescriptions, and it is up to me to make sure that I do not run out of my medicines on weekends or holidays. If my care team is willing to refill my opioid prescription without a visit, I must request refills only during office hours, refills may take up to 3 days to process, and it may take up to 5 to 7 days for my medicine to be mailed and ready at my pharmacy. Prescriptions will not be mailed anywhere except my pharmacy.        [id number]  Rev 12/18         Registration to scan to EHR                             Page 1 of 2               Controlled Substance Agreement Opioid        [facility] [place]  04/12/19  Patient: Michael Colorado  YOB: 1955  Medical Record Number: [id number]                                                                  6. I am responsible for my prescriptions. If the medicine/prescription is lost or stolen, it will not be replaced. I also agree not to share controlled substance medicines with anyone.  7. I agree to not use ANY illegal or recreational drugs. This includes marijuana, cocaine, bath salts or other drugs. I agree not to use alcohol unless my care team says I may.          I agree to give urine samples whenever asked. If I don t give a urine sample, the care team may stop my medicine.    8. If I enroll in the [place] Medical Marijuana program, I will tell my care team. I will also sign an agreement to share my medical records with my care team.   9. I will bring in my list of medicines (or my medicine bottles) each time I come to the clinic.   10. I will tell my care team right away if I become pregnant or have a new medical problem  treated outside of my regular clinic.  11. I understand that this medicine can affect my thinking and judgment. It may be unsafe for me to drive, use machinery and do dangerous tasks. I will not do any of these things until I know how the medicine affects me. If my dose changes, I will wait to see how it affects me. I will contact my care team if I have concerns about medicine side effects.    I understand that if I do not follow any of the conditions above, my prescriptions or treatment may be stopped.      I agree that my provider, clinic care team, and pharmacy may work with any city, state or federal law enforcement agency that investigates the misuse, sale, or other diversion of my controlled medicine. I will allow my provider to discuss my care with or share a copy of this agreement with any other treating provider, pharmacy or emergency room where I receive care. I agree to give up (waive) any right of privacy or confidentiality with respect to these consents.     I have read this agreement and have asked questions about anything I did not understand.      ________________________________________________________________________  Patient signature - Date/Time -  Michael Colorado                                      ________________________________________________________________________  Witness signature                                                            ________________________________________________________________________  Provider signature - Edison New MD      [id number]  Rev 12/18         Registration to scan to EHR                         Page 2 of 2                   Controlled Substance Agreement Opioid           Page 1 of 2  Opioid Pain Medicines (also known as Narcotics)  What You Need to Know    What are opioids?   Opioids are pain medicines that must be prescribed by a doctor.  They are also known as narcotics.    Examples are:     morphine (MS Contin, Stormy)    oxycodone  (Oxycontin)    oxycodone and acetaminophen (Percocet)    hydrocodone and acetaminophen (Vicodin, Norco)     fentanyl patch (Duragesic)     hydromorphone (Dilaudid)     methadone     What do opioids do well?   Opioids are best for short-term pain after a surgery or injury. They also work well for cancer pain. Unlike other pain medicines, they do not cause liver or kidney failure or ulcers. They may help some people with long-lasting (chronic) pain.     What do opioids NOT do well?   Opioids never get rid of pain entirely, and they do not work well for most patients with chronic pain. Opioids do not reduce swelling, one of the causes of pain. They also don t work well for nerve pain.                           For informational purposes only.  Not to replace the advice of your care provider.  Copyright [facility]. All right reserved. [place] 378386-[place]ju 02/18.      Page 2 of 2    Risks and side effects   Talk to your doctor before you start or decide to keep taking one of these medicines. Side effects include:    Lowering your breathing rate enough to cause death    Overdose, including death, especially if taking higher than prescribed doses    Long-term opioid use    Worse depression symptoms; less pleasure in things you usually enjoy    Feeling tired or sluggish    Slower thoughts or cloudy thinking    Being more sensitive to pain over time; pain is harder to control    Trouble sleeping or restless sleep    Changes in hormone levels (for example, less testosterone)    Changes in sex drive or ability to have sex    Constipation    Unsafe driving    Itching and sweating    Feeling dizzy    Nausea, vomiting and dry mouth    What else should I know about opioids?  When someone takes opioids for too long or too often, they become dependent. This means that if you stop or reduce the medicine too quickly, you will have withdrawal symptoms.    Dependence is not the same as addiction. Addiction is when  people keep using a substance that harms their body, their mind or their relations with others. If you have a history of drug or alcohol abuse, taking opioids can cause a relapse.    Over time, opioids don t work as well. Most people will need higher and higher doses. The higher the dose, the more serious the side effects. We don t know the long-term effects of opioids.      Prescribed opioids aren't the best way to manage chronic pain    Other ways to manage pain include:      Ibuprofen or acetaminophen.  You should always try this first.      Treat health problems that may be causing pain.      acupuncture or massage, deep breathing, meditation, visual imagery, aromatherapy.      Use heat or ice at the pain site      Physical therapy and exercise      Stop smoking      See a counselor or therapist                                                  People who have used opioids for a long time may have a lower quality of life, worse depression, higher levels of pain and more visits to doctors.    Never share your opioids with others. Be sure to store opioids in a secure place, locked if possible.Young children can easily swallow them and overdose.     You can overdose on opioids.  Signs of overdose include decrease or loss of consciousness, slowed breathing, trouble waking and blue lips.  If someone is worried about overdose, they should call 911.    If you are at risk for overdose, you may get naloxone (Narcan, a medicine that reverses the effects of opioids.  If you overdose, a friend or family member can give you Narcan while waiting for the ambulance.  They need to know the signs of overdose and how to give Narcan.    While you're taking opioids:    Don't use alcohol or street drugs. Taking them together can cause death.    Don't take any of these medicines unless your doctor says its okay.  Taking these with opioids can cause death.    Benzodiazepines (such as lorazepam         or diazepam)    Muscle relaxers  (such as cyclobenzaprine)    sleeping pills    other opioids    Safe disposal of opioids  Find your area drug take-back program, your pharmacy mail-back program, buy a special disposal bag (such as Deterra) from your pharmacy or flush them down the toilet.  Use the guidelines at:  www.fda.gov/drugs/resourcesforyou

## 2019-04-12 NOTE — PROGRESS NOTES
SUBJECTIVE:   Michael Colorado is a 64 year old male who presents to clinic today for the following   health issues:        Medication Followup of Morphine     Taking Medication as prescribed: yes    Side Effects:  Constipation     Medication Helping Symptoms:  Sometimes        Additional history: as documented    Reviewed  and updated as needed this visit by clinical staff         Reviewed and updated as needed this visit by Provider         Patient Active Problem List   Diagnosis     Other and unspecified disc disorder     Tinnitus     CARDIOVASCULAR SCREENING; LDL GOAL LESS THAN 130     Advanced directives, counseling/discussion     Primary localized osteoarthrosis, lower leg     Smoker     Morbid obesity with BMI of 45.0-49.9, adult (H)     Morbid obesity (H)     Chronic pain syndrome     Hepatitis C     Obstructive sleep apnea     Tobacco use disorder     Type 2 diabetes mellitus with hyperglycemia (H)     Chemical dependency (H)     Hepatitis C, chronic (H)     Past Surgical History:   Procedure Laterality Date     LAPAROSCOPIC HERNIORRHAPHY VENTRAL N/A 3/13/2018    Procedure: LAPAROSCOPIC HERNIORRHAPHY VENTRAL;  LAPAROSCOPIC VENTRAL HERNIA REPAIR WITH MESH  ;  Surgeon: Jairo Angel MD;  Location: [facility]     ORTHOPEDIC SURGERY Bilateral     ARTHROSCOPY - L X 1, R X 2     SURGICAL HISTORY OF -   10/03    Lt knee partial medial meniscectomy       Social History     Tobacco Use     Smoking status: Current Every Day Smoker     Packs/day: 1.00     Years: 30.00     Pack years: 30.00     Types: Cigarettes     Smokeless tobacco: Never Used   Substance Use Topics     Alcohol use: No     Comment: quit      Family History   Problem Relation Age of Onset     Diabetes Brother          Current Outpatient Medications   Medication Sig Dispense Refill     aspirin 81 MG tablet Take by mouth daily       BASAGLAR 100 UNIT/ML injection Inject 52 Units Subcutaneous At Bedtime 30 mL 1     blood glucose (ONETOUCH VERIO IQ) test  strip USE TO TEST THREE TIMES DAILY OR AS DIRECTED 100 strip 3     blood glucose monitoring (ONE TOUCH DELICA) lancets Use to test blood sugar 3 times daily or as directed.  Ok to substitute alternative if insurance prefers. 100 each 6     insulin pen needle 32G X 4 MM Use 4 pen needles daily or as directed. 150 each 3     metFORMIN (GLUCOPHAGE) 1000 MG tablet Take 1 tablet (1,000 mg) by mouth 2 times daily (with meals) 180 tablet 1     [START ON 4/14/2019] morphine (MSIR) 15 MG IR tablet Take 0.5 tablets (7.5 mg) by mouth every 8 hours as needed for severe pain 40 tablet 0     NOVOLOG FLEXPEN 100 UNIT/ML soln SEE NOTES 15 mL 0     polyethylene glycol (MIRALAX/GLYCOLAX) powder TAKE 17 GRAMS(1 CAPFUL) BY MOUTH DAILY 527 g 3     VICTOZA PEN 18 MG/3ML soln START WITH INJECTING 0.6MG DAILY X 1 WEEK, THEN INCREASE TO 1.2MG DAILY 6 mL 0     blood glucose monitoring (NO BRAND SPECIFIED) test strip Use to test blood sugars 4 times daily or as directed please provide per formulary approval patient states Verio. (Patient not taking: Reported on 2/13/2019) 100 strip 3     insulin glargine (BASAGLAR KWIKPEN) 100 UNIT/ML pen Inject 49 Units Subcutaneous At Bedtime (Patient not taking: Reported on 4/12/2019) 30 mL 0     STATIN NOT PRESCRIBED, INTENTIONAL, 1 each daily Please choose reason not prescribed, below (Patient not taking: Reported on 3/14/2019)       Allergies   Allergen Reactions     No Known Drug Allergies      Recent Labs   Lab Test 01/14/19  1212 09/12/18  1157 04/06/18  1158  01/09/18  1122  12/03/15  1021  04/29/13  1138   A1C 7.0* 7.7* 6.8*  --  11.1*   < > 6.7*   < > 6.4*   * 114*  --   --  80  --  71   < > 112   HDL 29*  --   --   --  30*  --  28*   < > 35*   TRIG 338*  --   --   --  368*  --  312*   < > 218*   ALT 32 30 18  --  25   < >  --    < > 50   CR 0.73 0.56* 0.64*   < > 0.58*   < > 0.60*   < > 0.60*   GFRESTIMATED >90 >90 >90   < > >90   < > >90  Non  GFR Calc     < > >90  "  GFRESTBLACK >90 >90 >90   < > >90   < > >90  African American GFR Calc     < > >90   POTASSIUM 4.3 4.2 4.2   < > 4.3   < > 3.9   < > 4.1   TSH  --   --   --   --  2.20  --   --   --  1.25    < > = values in this interval not displayed.      BP Readings from Last 3 Encounters:   04/12/19 138/88   03/14/19 122/76   02/13/19 122/66    Wt Readings from Last 3 Encounters:   04/12/19 (!) 171.9 kg (379 lb)   03/14/19 (!) 171.9 kg (379 lb)   01/14/19 (!) 170.7 kg (376 lb 6.4 oz)                    ROS:  Constitutional, HEENT, cardiovascular, pulmonary, gi and gu systems are negative, except as otherwise noted.    OBJECTIVE:     /88 (Cuff Size: Adult Large)   Pulse 85   Temp 97.2  F (36.2  C) (Tympanic)   Resp 18   Ht 2.019 m (6' 7.5\")   Wt (!) 171.9 kg (379 lb)   SpO2 97%   BMI 42.16 kg/m    Body mass index is 42.16 kg/m .  GENERAL: healthy, alert and no distress  NECK: no adenopathy, no asymmetry, masses, or scars and thyroid normal to palpation  RESP: lungs clear to auscultation - no rales, rhonchi or wheezes  CV: regular rate and rhythm, normal S1 S2, no S3 or S4, no murmur, click or rub, no peripheral edema and peripheral pulses strong  ABDOMEN: soft, nontender, no hepatosplenomegaly, no masses and bowel sounds normal  MS: no gross musculoskeletal defects noted, no edema      ASSESSMENT/PLAN:   ASSESSMENT / PLAN:  (E66.01,  Z68.42) Morbid obesity with BMI of 45.0-49.9, adult (H)  (primary encounter diagnosis)  Comment: has no change, encouraged him to keep working on life style modification   Plan: Hemoglobin A1c, Basic metabolic panel  (Ca, Cl,        CO2, Creat, Gluc, K, Na, BUN), ALT, Albumin         Random Urine Quantitative with Creat Ratio            (F19.20) Chemical dependency (H)  Comment: has no sign of overdosing nor using other chemicals, will have him to recheck utox   Plan: morphine (MSIR) 15 MG IR tablet, Drug  Screen         Comprehensive , Urine with Reported Meds         (MedTox) (Pain " Care Package)            (M17.10) Primary localized osteoarthrosis of lower leg, unspecified laterality  Comment: stable   Plan: morphine (MSIR) 15 MG IR tablet, Drug  Screen         Comprehensive , Urine with Reported Meds         (MedTox) (Pain Care Package)            (E11.65,  Z79.4) Type 2 diabetes mellitus with hyperglycemia, with long-term current use of insulin (H)  Comment: has been fluctuating, will recheck lab for further rvaluation   Plan: Hemoglobin A1c, Basic metabolic panel  (Ca, Cl,        CO2, Creat, Gluc, K, Na, BUN), ALT, Albumin         Random Urine Quantitative with Creat Ratio                FUTURE APPOINTMENTS:       - Follow-up visit in 1 month for med check     Edison New MD  [facility] [place]

## 2019-04-14 ENCOUNTER — TELEPHONE (OUTPATIENT)
Dept: FAMILY MEDICINE | Facility: CLINIC | Age: 64
End: 2019-04-14

## 2019-04-14 DIAGNOSIS — E11.65 TYPE 2 DIABETES MELLITUS WITH HYPERGLYCEMIA, WITH LONG-TERM CURRENT USE OF INSULIN (H): ICD-10-CM

## 2019-04-14 DIAGNOSIS — Z79.4 TYPE 2 DIABETES MELLITUS WITH HYPERGLYCEMIA, WITH LONG-TERM CURRENT USE OF INSULIN (H): ICD-10-CM

## 2019-04-14 RX ORDER — INSULIN GLARGINE 100 [IU]/ML
55 INJECTION, SOLUTION SUBCUTANEOUS AT BEDTIME
Qty: 30 ML | Refills: 1
Start: 2019-04-14 | End: 2019-07-12

## 2019-04-15 ASSESSMENT — ANXIETY QUESTIONNAIRES
1. FEELING NERVOUS, ANXIOUS, OR ON EDGE: NOT AT ALL
6. BECOMING EASILY ANNOYED OR IRRITABLE: NOT AT ALL
GAD7 TOTAL SCORE: 1
3. WORRYING TOO MUCH ABOUT DIFFERENT THINGS: NOT AT ALL
IF YOU CHECKED OFF ANY PROBLEMS ON THIS QUESTIONNAIRE, HOW DIFFICULT HAVE THESE PROBLEMS MADE IT FOR YOU TO DO YOUR WORK, TAKE CARE OF THINGS AT HOME, OR GET ALONG WITH OTHER PEOPLE: NOT DIFFICULT AT ALL
5. BEING SO RESTLESS THAT IT IS HARD TO SIT STILL: NOT AT ALL
7. FEELING AFRAID AS IF SOMETHING AWFUL MIGHT HAPPEN: NOT AT ALL
2. NOT BEING ABLE TO STOP OR CONTROL WORRYING: NOT AT ALL

## 2019-04-15 ASSESSMENT — PATIENT HEALTH QUESTIONNAIRE - PHQ9
5. POOR APPETITE OR OVEREATING: SEVERAL DAYS
SUM OF ALL RESPONSES TO PHQ QUESTIONS 1-9: 7

## 2019-04-16 ASSESSMENT — ANXIETY QUESTIONNAIRES: GAD7 TOTAL SCORE: 1

## 2019-04-18 LAB — PAIN DRUG SCR UR W RPTD MEDS: NORMAL

## 2019-04-25 ENCOUNTER — PATIENT OUTREACH (OUTPATIENT)
Dept: CARE COORDINATION | Facility: CLINIC | Age: 64
End: 2019-04-25

## 2019-04-25 DIAGNOSIS — Z76.89 HEALTH CARE HOME: Primary | ICD-10-CM

## 2019-04-25 DIAGNOSIS — L03.114 CELLULITIS OF HAND, LEFT: Primary | ICD-10-CM

## 2019-04-25 NOTE — PROGRESS NOTES
Clinic Care Coordination Contact  [facility]/Voicemail    Referral Source: ED Follow-Up    4/24/2019-[facility] [place] ED/UC -ED documentation copied below:    Arrives ambulatory to triage. Patient presents with left hand pain and swelling, states that he scraped/cut one of his fingers and believes this is what has caused his presentation. Acknowledges history of infections from punctures/scrapes, for which he has been seen in the ED/UC previously. States this began approximately 4 days ago. Pt has been taking ibuprofen with little improvement. Last tetanus within past few years, per pt. In [facility], last tdap from 2008.    Clinical Data: Care Coordinator Outreach    Outreach attempted x 1.  Left message on voicemail with call back information and requested return call.    Plan: . Care Coordinator will try to reach patient again in 1-2 business days.    Betty Peters RN, Care Coordinator   [facility] -Care Coordination  Martha Bolden

## 2019-04-26 NOTE — PROGRESS NOTES
Clinic Care Coordination Contact  [facility]/Voicemail    Referral Source: ED Follow-Up    4/24/2019-[facility] [place] [facility]/UC -ED documentation copied below:    Arrives ambulatory to triage. Patient presents with left hand pain and swelling, states that he scraped/cut one of his fingers and believes this is what has caused his presentation. Acknowledges history of infections from punctures/scrapes, for which he has been seen in the ED/UC previously. States this began approximately 4 days ago. Pt has been taking ibuprofen with little improvement. Last tetanus within past few years, per pt. In [facility], last tdap from 2008.    Clinical Data: Care Coordinator Outreach  Outreach attempted x 2.  Left message on voicemail with call back information and requested return call.  Plan:  Care Coordinator will do no further outreaches at this time.  Betty Peters RN, Care Coordinator   [facility] -Care Coordination  Martha Bolden

## 2019-04-30 DIAGNOSIS — Z79.4 TYPE 2 DIABETES MELLITUS WITH HYPERGLYCEMIA, WITH LONG-TERM CURRENT USE OF INSULIN (H): ICD-10-CM

## 2019-04-30 DIAGNOSIS — E11.65 TYPE 2 DIABETES MELLITUS WITH HYPERGLYCEMIA, WITH LONG-TERM CURRENT USE OF INSULIN (H): ICD-10-CM

## 2019-05-01 RX ORDER — INSULIN ASPART 100 [IU]/ML
INJECTION, SOLUTION INTRAVENOUS; SUBCUTANEOUS
Qty: 15 ML | Refills: 0 | Status: SHIPPED | OUTPATIENT
Start: 2019-05-01 | End: 2019-06-27

## 2019-05-01 RX ORDER — INSULIN GLARGINE 100 [IU]/ML
55 INJECTION, SOLUTION SUBCUTANEOUS AT BEDTIME
Qty: 30 ML | Refills: 1 | Status: SHIPPED | OUTPATIENT
Start: 2019-05-01 | End: 2019-07-12

## 2019-05-01 NOTE — TELEPHONE ENCOUNTER
Filled per [facility] protocol. Note from LOV on 4/14/2019:   His A1C got worsened, please have him to increase Basaglar from 52 units to 55 units for next 3 months and recheck the lab    TRICE HahnN, RN  [facility] [employer] [facility]

## 2019-05-01 NOTE — TELEPHONE ENCOUNTER
"Requested Prescriptions   Pending Prescriptions Disp Refills     insulin glargine (BASAGLAR KWIKPEN) 100 UNIT/ML pen [Pharmacy Med Name: BASAGLAR 100 U/ML KWIKPEN INJ 3ML]  0     Sig: INJECT 49 UNITS UNDER THE SKIN EVERY EVENING AT BEDTIME       Long Acting Insulin Protocol Passed - 4/30/2019  3:47 PM        Passed - Blood pressure less than 140/90 in past 6 months     BP Readings from Last 3 Encounters:   04/12/19 138/88   03/14/19 122/76   02/13/19 122/66                 Passed - LDL on file in past 12 months     Recent Labs   Lab Test 01/14/19  1212   *             Passed - Microalbumin on file in past 12 months     Recent Labs   Lab Test 04/12/19  1407   MICROL 11   UMALCR 12.97             Passed - Serum creatinine on file in past 12 months     Recent Labs   Lab Test 04/12/19  1407   CR 0.64*             Passed - HgbA1C in past 3 or 6 months     If HgbA1C is 8 or greater, it needs to be on file within the past 3 months.  If less than 8, must be on file within the past 6 months.     Recent Labs   Lab Test 04/12/19  1407   A1C 7.5*             Passed - Medication is active on med list        Passed - Patient is age 18 or older        Passed - Recent (6 mo) or future (30 days) visit within the authorizing provider's specialty     Patient had office visit in the last 6 months or has a visit in the next 30 days with authorizing provider or within the authorizing provider's specialty.  See \"Patient Info\" tab in inbasket, or \"Choose Columns\" in Meds & Orders section of the refill encounter.            NOVOLOG FLEXPEN 100 UNIT/ML soln [Pharmacy Med Name: NOVOLOG FLEXPEN INJ 3ML (ORANGE)] 15 mL 0     Sig: SEE NOTES       Short Acting Insulin Protocol Passed - 4/30/2019  3:47 PM        Passed - Blood pressure less than 140/90 in past 6 months     BP Readings from Last 3 Encounters:   04/12/19 138/88   03/14/19 122/76   02/13/19 122/66                 Passed - LDL on file in past 12 months     Recent Labs   Lab " "Test 01/14/19  1212   *             Passed - Microalbumin on file in past 12 months     Recent Labs   Lab Test 04/12/19  1407   MICROL 11   UMALCR 12.97             Passed - Serum creatinine on file in past 12 months     Recent Labs   Lab Test 04/12/19  1407   CR 0.64*             Passed - HgbA1C in past 3 or 6 months     If HgbA1C is 8 or greater, it needs to be on file within the past 3 months.  If less than 8, must be on file within the past 6 months.     Recent Labs   Lab Test 04/12/19  1407   A1C 7.5*             Passed - Medication is active on med list        Passed - Patient is age 18 or older        Passed - Recent (6 mo) or future (30 days) visit within the authorizing provider's specialty     Patient had office visit in the last 6 months or has a visit in the next 30 days with authorizing provider or within the authorizing provider's specialty.  See \"Patient Info\" tab in inbasket, or \"Choose Columns\" in Meds & Orders section of the refill encounter.          Basaglar Quick Pen  Last Written Prescription Date:  4/14/2019  Last Fill Quantity: 30,  # refills: 1   Last office visit: 4/12/2019 with prescribing provider:  4/14/19   Future Office Visit:   Next 5 appointments (look out 90 days)    May 14, 2019 10:40 AM CDT  Office Visit with Edison New MD  [facility] [place] ([address] [address]  [phone]       Novolog Flex Pain  Last Written Prescription Date:  12/27/2018  Last Fill Quantity: 15,  # refills: 0   Last office visit: 4/12/2019 with prescribing provider:  4/12/2019   Future Office Visit:   Next 5 appointments (look out 90 days)    May 14, 2019 10:40 AM CDT  Office Visit with Edison New MD  [address] [address]  [phone]           "

## 2019-05-14 ENCOUNTER — OFFICE VISIT (OUTPATIENT)
Dept: FAMILY MEDICINE | Facility: CLINIC | Age: 64
End: 2019-05-14
Payer: COMMERCIAL

## 2019-05-14 VITALS
TEMPERATURE: 97.6 F | RESPIRATION RATE: 22 BRPM | DIASTOLIC BLOOD PRESSURE: 84 MMHG | WEIGHT: 315 LBS | HEART RATE: 87 BPM | BODY MASS INDEX: 36.45 KG/M2 | OXYGEN SATURATION: 100 % | HEIGHT: 78 IN | SYSTOLIC BLOOD PRESSURE: 146 MMHG

## 2019-05-14 DIAGNOSIS — M17.10 PRIMARY LOCALIZED OSTEOARTHROSIS OF LOWER LEG, UNSPECIFIED LATERALITY: ICD-10-CM

## 2019-05-14 DIAGNOSIS — Z11.4 SCREENING FOR HIV (HUMAN IMMUNODEFICIENCY VIRUS): ICD-10-CM

## 2019-05-14 DIAGNOSIS — L03.114 CELLULITIS OF LEFT UPPER EXTREMITY: Primary | ICD-10-CM

## 2019-05-14 DIAGNOSIS — F19.20 CHEMICAL DEPENDENCY (H): ICD-10-CM

## 2019-05-14 DIAGNOSIS — Z23 NEED FOR PROPHYLACTIC VACCINATION WITH TETANUS-DIPHTHERIA (TD): ICD-10-CM

## 2019-05-14 PROCEDURE — 99214 OFFICE O/P EST MOD 30 MIN: CPT | Performed by: FAMILY MEDICINE

## 2019-05-14 RX ORDER — MORPHINE SULFATE 15 MG/1
7.5 TABLET ORAL EVERY 8 HOURS PRN
Qty: 30 TABLET | Refills: 0 | Status: SHIPPED | OUTPATIENT
Start: 2019-05-14 | End: 2019-06-13

## 2019-05-14 ASSESSMENT — MIFFLIN-ST. JEOR: SCORE: 2662.58

## 2019-05-14 NOTE — PROGRESS NOTES
SUBJECTIVE:   Michael Colorado is a 64 year old male who presents to clinic today for the following   health issues:        Results         Description (location/character/radiation): Pt is here to go over recent utox results.      Additional history: as documented    Reviewed  and updated as needed this visit by clinical staff         Reviewed and updated as needed this visit by Provider         Patient Active Problem List   Diagnosis     Other and unspecified disc disorder     Tinnitus     CARDIOVASCULAR SCREENING; LDL GOAL LESS THAN 130     Advanced directives, counseling/discussion     Primary localized osteoarthrosis, lower leg     Smoker     Morbid obesity with BMI of 45.0-49.9, adult (H)     Morbid obesity (H)     Chronic pain syndrome     Hepatitis C     Obstructive sleep apnea     Tobacco use disorder     Type 2 diabetes mellitus with hyperglycemia (H)     Chemical dependency (H)     Hepatitis C, chronic (H)     Past Surgical History:   Procedure Laterality Date     LAPAROSCOPIC HERNIORRHAPHY VENTRAL N/A 3/13/2018    Procedure: LAPAROSCOPIC HERNIORRHAPHY VENTRAL;  LAPAROSCOPIC VENTRAL HERNIA REPAIR WITH MESH  ;  Surgeon: Jairo Angel MD;  Location: [facility]     ORTHOPEDIC SURGERY Bilateral     ARTHROSCOPY - L X 1, R X 2     SURGICAL HISTORY OF -   10/03    Lt knee partial medial meniscectomy       Social History     Tobacco Use     Smoking status: Current Every Day Smoker     Packs/day: 1.00     Years: 30.00     Pack years: 30.00     Types: Cigarettes     Smokeless tobacco: Never Used   Substance Use Topics     Alcohol use: No     Comment: quit      Family History   Problem Relation Age of Onset     Diabetes Brother          Current Outpatient Medications   Medication Sig Dispense Refill     amoxicillin-clavulanate (AUGMENTIN) 875-125 MG tablet Take 1 tablet by mouth 2 times daily 20 tablet 0     aspirin 81 MG tablet Take by mouth daily       blood glucose (ONETOUCH VERIO IQ) test strip USE TO TEST THREE  TIMES DAILY OR AS DIRECTED 100 strip 3     blood glucose monitoring (ONE TOUCH DELICA) lancets Use to test blood sugar 3 times daily or as directed.  Ok to substitute alternative if insurance prefers. 100 each 6     insulin glargine (BASAGLAR KWIKPEN) 100 UNIT/ML pen Inject 55 Units Subcutaneous At Bedtime 30 mL 1     insulin glargine (BASAGLAR KWIKPEN) 100 UNIT/ML pen Inject 55 Units Subcutaneous At Bedtime 30 mL 1     insulin pen needle 32G X 4 MM Use 4 pen needles daily or as directed. 150 each 3     metFORMIN (GLUCOPHAGE) 1000 MG tablet Take 1 tablet (1,000 mg) by mouth 2 times daily (with meals) 180 tablet 1     morphine (MSIR) 15 MG IR tablet Take 0.5 tablets (7.5 mg) by mouth every 8 hours as needed for severe pain 30 tablet 0     NOVOLOG FLEXPEN 100 UNIT/ML soln SEE NOTES 15 mL 0     polyethylene glycol (MIRALAX/GLYCOLAX) powder TAKE 17 GRAMS(1 CAPFUL) BY MOUTH DAILY 527 g 3     STATIN NOT PRESCRIBED, INTENTIONAL, 1 each daily Please choose reason not prescribed, below       VICTOZA PEN 18 MG/3ML soln START WITH INJECTING 0.6MG DAILY X 1 WEEK, THEN INCREASE TO 1.2MG DAILY 6 mL 0     blood glucose monitoring (NO BRAND SPECIFIED) test strip Use to test blood sugars 4 times daily or as directed please provide per formulary approval patient states Verio. (Patient not taking: Reported on 2/13/2019) 100 strip 3     Allergies   Allergen Reactions     No Known Drug Allergies      Recent Labs   Lab Test 04/12/19  1407 01/14/19  1212 09/12/18  1157  01/09/18  1122  12/03/15  1021  04/29/13  1138   A1C 7.5* 7.0* 7.7*   < > 11.1*   < > 6.7*   < > 6.4*   LDL  --  103* 114*  --  80  --  71   < > 112   HDL  --  29*  --   --  30*  --  28*   < > 35*   TRIG  --  338*  --   --  368*  --  312*   < > 218*   ALT 29 32 30   < > 25   < >  --    < > 50   CR 0.64* 0.73 0.56*   < > 0.58*   < > 0.60*   < > 0.60*   GFRESTIMATED >90 >90 >90   < > >90   < > >90  Non  GFR Calc     < > >90   GFRESTBLACK >90 >90 >90   < > >90  "  < > >90   GFR Calc     < > >90   POTASSIUM 4.4 4.3 4.2   < > 4.3   < > 3.9   < > 4.1   TSH  --   --   --   --  2.20  --   --   --  1.25    < > = values in this interval not displayed.      BP Readings from Last 3 Encounters:   05/14/19 146/84   04/12/19 138/88   03/14/19 122/76    Wt Readings from Last 3 Encounters:   05/14/19 (!) 170.6 kg (376 lb)   04/12/19 (!) 171.9 kg (379 lb)   03/14/19 (!) 171.9 kg (379 lb)                    ROS:  Constitutional, HEENT, cardiovascular, pulmonary, gi and gu systems are negative, except as otherwise noted.    OBJECTIVE:     /84 (Cuff Size: Adult Large)   Pulse 87   Temp 97.6  F (36.4  C) (Tympanic)   Resp 22   Ht 2.019 m (6' 7.5\")   Wt (!) 170.6 kg (376 lb)   SpO2 100%   BMI 41.83 kg/m    Body mass index is 41.83 kg/m .  GENERAL: healthy, alert and no distress  EYES: Eyes grossly normal to inspection, PERRL and conjunctivae and sclerae normal  HENT: ear canals and TM's normal, nose and mouth without ulcers or lesions  NECK: no adenopathy, no asymmetry, masses, or scars and thyroid normal to palpation  RESP: lungs clear to auscultation - no rales, rhonchi or wheezes  CV: regular rate and rhythm, normal S1 S2, no S3 or S4, no murmur, click or rub, no peripheral edema and peripheral pulses strong  ABDOMEN: soft, nontender, no hepatosplenomegaly, no masses and bowel sounds normal  MS: no gross musculoskeletal defects noted, no edema        ASSESSMENT/PLAN:   ASSESSMENT / PLAN:  (L03.114) Cellulitis of left upper extremity  (primary encounter diagnosis)  Comment: on left hand, was treated with keflex but not completely resolved, will have him to try Augmentin   Plan: amoxicillin-clavulanate (AUGMENTIN) 875-125 MG         tablet            (F19.20) Chemical dependency (H)  Comment: utox showed methadone positive, will have him to start tapering process, will have him to take 30 tablet of MSIR for this month, then will drop 10 more tablets next month, " then down to 10 tablets for the next month before tapering off of it   Plan: morphine (MSIR) 15 MG IR tablet            (M17.10) Primary localized osteoarthrosis of lower leg, unspecified laterality  Comment: mentioned above   Plan: morphine (MSIR) 15 MG IR tablet            FUTURE APPOINTMENTS:       - Follow-up visit in 1 month     Edison New MD  [facility] [place]

## 2019-06-05 DIAGNOSIS — K59.01 SLOW TRANSIT CONSTIPATION: ICD-10-CM

## 2019-06-06 RX ORDER — POLYETHYLENE GLYCOL 3350 17 G/17G
POWDER, FOR SOLUTION ORAL
Qty: 510 G | Refills: 3 | Status: SHIPPED | OUTPATIENT
Start: 2019-06-06 | End: 2019-10-23

## 2019-06-06 NOTE — TELEPHONE ENCOUNTER
"    Last Written Prescription Date:  2/13/19  Last Fill Quantity: 527g,  # refills: 3   Last office visit: 5/14/2019 with prescribing provider:     Future Office Visit:    Requested Prescriptions   Pending Prescriptions Disp Refills     polyethylene glycol (MIRALAX/GLYCOLAX) powder [Pharmacy Med Name: POLYETH GLYCOL 3350 NF POWDER 255GM] 510 g 0     Sig: MIX 17GM(1 CAPFUL) AND DRINK ONCE DAILY       Laxatives Protocol Passed - 6/5/2019  5:50 PM        Passed - Patient is age 6 or older        Passed - Recent (12 mo) or future (30 days) visit within the authorizing provider's specialty     Patient had office visit in the last 12 months or has a visit in the next 30 days with authorizing provider or within the authorizing provider's specialty.  See \"Patient Info\" tab in inbasket, or \"Choose Columns\" in Meds & Orders section of the refill encounter.              Passed - Medication is active on med list          "

## 2019-06-13 ENCOUNTER — OFFICE VISIT (OUTPATIENT)
Dept: FAMILY MEDICINE | Facility: CLINIC | Age: 64
End: 2019-06-13
Payer: COMMERCIAL

## 2019-06-13 VITALS
WEIGHT: 315 LBS | RESPIRATION RATE: 16 BRPM | HEIGHT: 78 IN | OXYGEN SATURATION: 98 % | HEART RATE: 77 BPM | SYSTOLIC BLOOD PRESSURE: 134 MMHG | BODY MASS INDEX: 36.45 KG/M2 | DIASTOLIC BLOOD PRESSURE: 70 MMHG | TEMPERATURE: 97.5 F

## 2019-06-13 DIAGNOSIS — F19.20 CHEMICAL DEPENDENCY (H): ICD-10-CM

## 2019-06-13 DIAGNOSIS — M17.10 PRIMARY LOCALIZED OSTEOARTHROSIS OF LOWER LEG, UNSPECIFIED LATERALITY: ICD-10-CM

## 2019-06-13 DIAGNOSIS — S05.01XA ABRASION OF RIGHT CORNEA, INITIAL ENCOUNTER: Primary | ICD-10-CM

## 2019-06-13 PROCEDURE — 99214 OFFICE O/P EST MOD 30 MIN: CPT | Mod: 25 | Performed by: FAMILY MEDICINE

## 2019-06-13 PROCEDURE — 90715 TDAP VACCINE 7 YRS/> IM: CPT | Performed by: FAMILY MEDICINE

## 2019-06-13 PROCEDURE — 90471 IMMUNIZATION ADMIN: CPT | Performed by: FAMILY MEDICINE

## 2019-06-13 RX ORDER — MORPHINE SULFATE 15 MG/1
7.5 TABLET ORAL EVERY 8 HOURS PRN
Qty: 20 TABLET | Refills: 0 | Status: SHIPPED | OUTPATIENT
Start: 2019-06-14 | End: 2019-07-12

## 2019-06-13 ASSESSMENT — MIFFLIN-ST. JEOR: SCORE: 2694.34

## 2019-06-13 NOTE — PROGRESS NOTES
Subjective     Michael Colorado is a 64 year old male who presents to clinic today for the following health issues:    HPI   Medication Followup of Morphine     Taking Medication as prescribed: yes    Side Effects:  None    Medication Helping Symptoms:  yes     Eye Injury       Duration: 9 days     Description (location/character/radiation): right eye injury, got hit in right eye by a piece of wood.         Patient Active Problem List   Diagnosis     Other and unspecified disc disorder     Tinnitus     CARDIOVASCULAR SCREENING; LDL GOAL LESS THAN 130     Advanced directives, counseling/discussion     Primary localized osteoarthrosis, lower leg     Smoker     Morbid obesity with BMI of 45.0-49.9, adult (H)     Morbid obesity (H)     Chronic pain syndrome     Hepatitis C     Obstructive sleep apnea     Tobacco use disorder     Type 2 diabetes mellitus with hyperglycemia (H)     Chemical dependency (H)     Hepatitis C, chronic (H)     Past Surgical History:   Procedure Laterality Date     LAPAROSCOPIC HERNIORRHAPHY VENTRAL N/A 3/13/2018    Procedure: LAPAROSCOPIC HERNIORRHAPHY VENTRAL;  LAPAROSCOPIC VENTRAL HERNIA REPAIR WITH MESH  ;  Surgeon: Jairo Angel MD;  Location: [facility]     ORTHOPEDIC SURGERY Bilateral     ARTHROSCOPY - L X 1, R X 2     SURGICAL HISTORY OF -   10/03    Lt knee partial medial meniscectomy       Social History     Tobacco Use     Smoking status: Current Every Day Smoker     Packs/day: 1.00     Years: 30.00     Pack years: 30.00     Types: Cigarettes     Smokeless tobacco: Never Used   Substance Use Topics     Alcohol use: No     Comment: quit      Family History   Problem Relation Age of Onset     Diabetes Brother          Current Outpatient Medications   Medication Sig Dispense Refill     aspirin 81 MG tablet Take by mouth daily       blood glucose (ONETOUCH VERIO IQ) test strip USE TO TEST THREE TIMES DAILY OR AS DIRECTED 100 strip 3     blood glucose monitoring (ONE TOUCH DELICA) lancets Use to  test blood sugar 3 times daily or as directed.  Ok to substitute alternative if insurance prefers. 100 each 6     insulin glargine (BASAGLAR KWIKPEN) 100 UNIT/ML pen Inject 55 Units Subcutaneous At Bedtime 30 mL 1     insulin glargine (BASAGLAR KWIKPEN) 100 UNIT/ML pen Inject 55 Units Subcutaneous At Bedtime 30 mL 1     insulin pen needle 32G X 4 MM Use 4 pen needles daily or as directed. 150 each 3     metFORMIN (GLUCOPHAGE) 1000 MG tablet Take 1 tablet (1,000 mg) by mouth 2 times daily (with meals) 180 tablet 1     [START ON 6/14/2019] morphine (MSIR) 15 MG IR tablet Take 0.5 tablets (7.5 mg) by mouth every 8 hours as needed for severe pain 20 tablet 0     NOVOLOG FLEXPEN 100 UNIT/ML soln SEE NOTES 15 mL 0     polyethylene glycol (MIRALAX/GLYCOLAX) powder MIX 17GM(1 CAPFUL) AND DRINK ONCE DAILY 510 g 3     polyethylene glycol (MIRALAX/GLYCOLAX) powder TAKE 17 GRAMS(1 CAPFUL) BY MOUTH DAILY 527 g 3     STATIN NOT PRESCRIBED, INTENTIONAL, 1 each daily Please choose reason not prescribed, below       VICTOZA PEN 18 MG/3ML soln START WITH INJECTING 0.6MG DAILY X 1 WEEK, THEN INCREASE TO 1.2MG DAILY 6 mL 0     blood glucose monitoring (NO BRAND SPECIFIED) test strip Use to test blood sugars 4 times daily or as directed please provide per formulary approval patient states Verio. (Patient not taking: Reported on 2/13/2019) 100 strip 3     Allergies   Allergen Reactions     No Known Drug Allergies      Recent Labs   Lab Test 04/12/19  1407 01/14/19  1212 09/12/18  1157  01/09/18  1122  12/03/15  1021  04/29/13  1138   A1C 7.5* 7.0* 7.7*   < > 11.1*   < > 6.7*   < > 6.4*   LDL  --  103* 114*  --  80  --  71   < > 112   HDL  --  29*  --   --  30*  --  28*   < > 35*   TRIG  --  338*  --   --  368*  --  312*   < > 218*   ALT 29 32 30   < > 25   < >  --    < > 50   CR 0.64* 0.73 0.56*   < > 0.58*   < > 0.60*   < > 0.60*   GFRESTIMATED >90 >90 >90   < > >90   < > >90  Non  GFR Calc     < > >90   GFRESTBLACK >90  ">90 >90   < > >90   < > >90  African American GFR Calc     < > >90   POTASSIUM 4.4 4.3 4.2   < > 4.3   < > 3.9   < > 4.1   TSH  --   --   --   --  2.20  --   --   --  1.25    < > = values in this interval not displayed.      BP Readings from Last 3 Encounters:   06/13/19 134/70   05/14/19 146/84   04/12/19 138/88    Wt Readings from Last 3 Encounters:   06/13/19 (!) 173.7 kg (383 lb)   05/14/19 (!) 170.6 kg (376 lb)   04/12/19 (!) 171.9 kg (379 lb)          Reviewed and updated as needed this visit by Provider         Review of Systems   ROS COMP: Constitutional, HEENT, cardiovascular, pulmonary, gi and gu systems are negative, except as otherwise noted.      Objective    /70 (Cuff Size: Adult Large)   Pulse 77   Temp 97.5  F (36.4  C) (Tympanic)   Resp 16   Ht 2.019 m (6' 7.5\")   Wt (!) 173.7 kg (383 lb)   SpO2 98%   BMI 42.61 kg/m    Body mass index is 42.61 kg/m .  Physical Exam   GENERAL: healthy, alert and no distress  EYES: PERRL, EOMI, visual fields normal and corneal abrasion on right side   HENT: ear canals and TM's normal, nose and mouth without ulcers or lesions  NECK: no adenopathy, no asymmetry, masses, or scars and thyroid normal to palpation  RESP: lungs clear to auscultation - no rales, rhonchi or wheezes  CV: regular rate and rhythm, normal S1 S2, no S3 or S4, no murmur, click or rub, no peripheral edema and peripheral pulses strong  ABDOMEN: soft, nontender, no hepatosplenomegaly, no masses and bowel sounds normal  MS: no gross musculoskeletal defects noted, no edema            Assessment & Plan     1. Chemical dependency (H)  Stable, has no sign of overdosing nor using other chemicals, will have him to keep working on life style modification   Will also have him to wean the dose of MSRI down to 20 tablets per month this month, then 10 tablets per month at next time   - morphine (MSIR) 15 MG IR tablet; Take 0.5 tablets (7.5 mg) by mouth every 8 hours as needed for severe pain  " "Dispense: 20 tablet; Refill: 0    2. Primary localized osteoarthrosis of lower leg, unspecified laterality  Mentioned  Above   - morphine (MSIR) 15 MG IR tablet; Take 0.5 tablets (7.5 mg) by mouth every 8 hours as needed for severe pain  Dispense: 20 tablet; Refill: 0    3. Abrasion of right cornea, initial encounter  Has mild corneal abrasio on right eye without sign of infection   Encouraged him to keep monitoring vision change and clinical sx of infection        Tobacco Cessation:   reports that he has been smoking cigarettes.  He has a 30.00 pack-year smoking history. He has never used smokeless tobacco.        BMI:   Estimated body mass index is 42.61 kg/m  as calculated from the following:    Height as of this encounter: 2.019 m (6' 7.5\").    Weight as of this encounter: 173.7 kg (383 lb).           FUTURE APPOINTMENTS:       - Follow-up visit in 1 month for med check     No follow-ups on file.    Edison New MD  [facility] [place]          "

## 2019-06-27 DIAGNOSIS — E11.65 TYPE 2 DIABETES MELLITUS WITH HYPERGLYCEMIA, WITH LONG-TERM CURRENT USE OF INSULIN (H): ICD-10-CM

## 2019-06-27 DIAGNOSIS — Z79.4 TYPE 2 DIABETES MELLITUS WITH HYPERGLYCEMIA, WITH LONG-TERM CURRENT USE OF INSULIN (H): ICD-10-CM

## 2019-06-28 RX ORDER — INSULIN ASPART 100 [IU]/ML
INJECTION, SOLUTION INTRAVENOUS; SUBCUTANEOUS
Qty: 15 ML | Refills: 0 | Status: SHIPPED | OUTPATIENT
Start: 2019-06-28

## 2019-06-28 NOTE — TELEPHONE ENCOUNTER
"Requested Prescriptions   Pending Prescriptions Disp Refills     NOVOLOG FLEXPEN 100 UNIT/ML soln [Pharmacy Med Name: NOVOLOG FLEXPEN INJ 3ML (ORANGE)] 15 mL 0     Sig: SEE NOTES  Last Written Prescription Date:  5/1/19  Last Fill Quantity: 15ml,  # refills: 0   Last office visit: 6/13/2019 with prescribing provider:  Shaq   Future Office Visit:           Short Acting Insulin Protocol Passed - 6/27/2019  6:55 PM        Passed - Blood pressure less than 140/90 in past 6 months     BP Readings from Last 3 Encounters:   06/13/19 134/70   05/14/19 146/84   04/12/19 138/88                 Passed - LDL on file in past 12 months     Recent Labs   Lab Test 01/14/19  1212   *             Passed - Microalbumin on file in past 12 months     Recent Labs   Lab Test 04/12/19  1407   MICROL 11   UMALCR 12.97             Passed - Serum creatinine on file in past 12 months     Recent Labs   Lab Test 04/12/19  1407   CR 0.64*             Passed - HgbA1C in past 3 or 6 months     If HgbA1C is 8 or greater, it needs to be on file within the past 3 months.  If less than 8, must be on file within the past 6 months.     Recent Labs   Lab Test 04/12/19  1407   A1C 7.5*             Passed - Medication is active on med list        Passed - Patient is age 18 or older        Passed - Recent (6 mo) or future (30 days) visit within the authorizing provider's specialty     Patient had office visit in the last 6 months or has a visit in the next 30 days with authorizing provider or within the authorizing provider's specialty.  See \"Patient Info\" tab in inbasket, or \"Choose Columns\" in Meds & Orders section of the refill encounter.              "

## 2019-07-01 ENCOUNTER — TELEPHONE (OUTPATIENT)
Dept: FAMILY MEDICINE | Facility: CLINIC | Age: 64
End: 2019-07-01

## 2019-07-12 ENCOUNTER — OFFICE VISIT (OUTPATIENT)
Dept: FAMILY MEDICINE | Facility: CLINIC | Age: 64
End: 2019-07-12
Payer: COMMERCIAL

## 2019-07-12 VITALS
SYSTOLIC BLOOD PRESSURE: 128 MMHG | BODY MASS INDEX: 36.45 KG/M2 | HEIGHT: 78 IN | WEIGHT: 315 LBS | DIASTOLIC BLOOD PRESSURE: 74 MMHG | RESPIRATION RATE: 22 BRPM | TEMPERATURE: 98.6 F | HEART RATE: 87 BPM | OXYGEN SATURATION: 97 %

## 2019-07-12 DIAGNOSIS — F19.20 CHEMICAL DEPENDENCY (H): ICD-10-CM

## 2019-07-12 DIAGNOSIS — E11.65 TYPE 2 DIABETES MELLITUS WITH HYPERGLYCEMIA, UNSPECIFIED WHETHER LONG TERM INSULIN USE (H): Primary | ICD-10-CM

## 2019-07-12 DIAGNOSIS — M17.10 PRIMARY LOCALIZED OSTEOARTHROSIS OF LOWER LEG, UNSPECIFIED LATERALITY: ICD-10-CM

## 2019-07-12 PROCEDURE — 99214 OFFICE O/P EST MOD 30 MIN: CPT | Performed by: FAMILY MEDICINE

## 2019-07-12 RX ORDER — MORPHINE SULFATE 15 MG/1
7.5 TABLET ORAL EVERY 8 HOURS PRN
Qty: 15 TABLET | Refills: 0 | Status: SHIPPED | OUTPATIENT
Start: 2019-07-14

## 2019-07-12 RX ORDER — LANCETS
EACH MISCELLANEOUS
Qty: 100 EACH | Refills: 3 | Status: SHIPPED | OUTPATIENT
Start: 2019-07-12

## 2019-07-12 RX ORDER — GLUCOSAMINE HCL/CHONDROITIN SU 500-400 MG
CAPSULE ORAL
Qty: 100 EACH | Refills: 3 | Status: SHIPPED | OUTPATIENT
Start: 2019-07-12

## 2019-07-12 ASSESSMENT — MIFFLIN-ST. JEOR: SCORE: 2676.19

## 2019-07-12 NOTE — PROGRESS NOTES
Subjective     Michael Colorado is a 64 year old male who presents to clinic today for the following health issues:    HPI   Medication Followup of Morphine     Taking Medication as prescribed: yes    Side Effects:  None    Medication Helping Symptoms:  yes       Patient Active Problem List   Diagnosis     Other and unspecified disc disorder     Tinnitus     CARDIOVASCULAR SCREENING; LDL GOAL LESS THAN 130     Advanced directives, counseling/discussion     Primary localized osteoarthrosis, lower leg     Smoker     Morbid obesity with BMI of 45.0-49.9, adult (H)     Morbid obesity (H)     Chronic pain syndrome     Hepatitis C     Obstructive sleep apnea     Tobacco use disorder     Type 2 diabetes mellitus with hyperglycemia (H)     Chemical dependency (H)     Hepatitis C, chronic (H)     Past Surgical History:   Procedure Laterality Date     LAPAROSCOPIC HERNIORRHAPHY VENTRAL N/A 3/13/2018    Procedure: LAPAROSCOPIC HERNIORRHAPHY VENTRAL;  LAPAROSCOPIC VENTRAL HERNIA REPAIR WITH MESH  ;  Surgeon: Jairo Angel MD;  Location: [facility]     ORTHOPEDIC SURGERY Bilateral     ARTHROSCOPY - L X 1, R X 2     SURGICAL HISTORY OF -   10/03    Lt knee partial medial meniscectomy       Social History     Tobacco Use     Smoking status: Current Every Day Smoker     Packs/day: 1.00     Years: 30.00     Pack years: 30.00     Types: Cigarettes     Smokeless tobacco: Never Used   Substance Use Topics     Alcohol use: No     Comment: quit      Family History   Problem Relation Age of Onset     Diabetes Brother          Current Outpatient Medications   Medication Sig Dispense Refill     alcohol swab prep pads Use to swab area of injection/chinyere as directed. 100 each 3     aspirin 81 MG tablet Take by mouth daily       blood glucose (ACCU-CHEK COMPACT DRUM) test strip Use to test blood sugars 2 times daily. 100 strip 3     blood glucose monitoring (ACCU-CHEK COMPACT CARE KIT) meter device kit Use to test blood sugars 2 times daily. 1 kit 0      blood glucose monitoring (SOFTCLIX) lancets Use to test blood sugar 2 times daily. 100 each 3     insulin glargine (LANTUS SOLOSTAR PEN) 100 UNIT/ML pen Inject 58 Units Subcutaneous At Bedtime 15 mL 3     insulin pen needle 32G X 4 MM Use 4 pen needles daily or as directed. 150 each 3     metFORMIN (GLUCOPHAGE) 1000 MG tablet Take 1 tablet (1,000 mg) by mouth 2 times daily (with meals) 180 tablet 1     [START ON 7/14/2019] morphine (MSIR) 15 MG IR tablet Take 0.5 tablets (7.5 mg) by mouth every 8 hours as needed for severe pain 15 tablet 0     NOVOLOG FLEXPEN 100 UNIT/ML soln SEE NOTES 15 mL 0     polyethylene glycol (MIRALAX/GLYCOLAX) powder MIX 17GM(1 CAPFUL) AND DRINK ONCE DAILY 510 g 3     polyethylene glycol (MIRALAX/GLYCOLAX) powder TAKE 17 GRAMS(1 CAPFUL) BY MOUTH DAILY 527 g 3     VICTOZA PEN 18 MG/3ML soln START WITH INJECTING 0.6MG DAILY X 1 WEEK, THEN INCREASE TO 1.2MG DAILY 6 mL 0     blood glucose monitoring (NO BRAND SPECIFIED) test strip Use to test blood sugars 4 times daily or as directed please provide per formulary approval patient states Verio. (Patient not taking: Reported on 2/13/2019) 100 strip 3     STATIN NOT PRESCRIBED, INTENTIONAL, 1 each daily Please choose reason not prescribed, below (Patient not taking: Reported on 7/12/2019)       Allergies   Allergen Reactions     No Known Drug Allergies      Recent Labs   Lab Test 04/12/19  1407 01/14/19  1212 09/12/18  1157  01/09/18  1122  12/03/15  1021  04/29/13  1138   A1C 7.5* 7.0* 7.7*   < > 11.1*   < > 6.7*   < > 6.4*   LDL  --  103* 114*  --  80  --  71   < > 112   HDL  --  29*  --   --  30*  --  28*   < > 35*   TRIG  --  338*  --   --  368*  --  312*   < > 218*   ALT 29 32 30   < > 25   < >  --    < > 50   CR 0.64* 0.73 0.56*   < > 0.58*   < > 0.60*   < > 0.60*   GFRESTIMATED >90 >90 >90   < > >90   < > >90  Non  GFR Calc     < > >90   GFRESTBLACK >90 >90 >90   < > >90   < > >90  African American GFR Calc     < > >90  "  POTASSIUM 4.4 4.3 4.2   < > 4.3   < > 3.9   < > 4.1   TSH  --   --   --   --  2.20  --   --   --  1.25    < > = values in this interval not displayed.      BP Readings from Last 3 Encounters:   07/12/19 128/74   06/13/19 134/70   05/14/19 146/84    Wt Readings from Last 3 Encounters:   07/12/19 (!) 171.9 kg (379 lb)   06/13/19 (!) 173.7 kg (383 lb)   05/14/19 (!) 170.6 kg (376 lb)          Reviewed and updated as needed this visit by Provider         Review of Systems   ROS COMP: Constitutional, HEENT, cardiovascular, pulmonary, gi and gu systems are negative, except as otherwise noted.      Objective    /74 (Cuff Size: Adult Large)   Pulse 87   Temp 98.6  F (37  C) (Tympanic)   Resp 22   Ht 2.019 m (6' 7.5\")   Wt (!) 171.9 kg (379 lb)   SpO2 97%   BMI 42.16 kg/m    Body mass index is 42.16 kg/m .  Physical Exam   GENERAL: healthy, alert and no distress  EYES: Eyes grossly normal to inspection, PERRL and conjunctivae and sclerae normal  HENT: ear canals and TM's normal, nose and mouth without ulcers or lesions  NECK: no adenopathy, no asymmetry, masses, or scars and thyroid normal to palpation  RESP: lungs clear to auscultation - no rales, rhonchi or wheezes  CV: regular rate and rhythm, normal S1 S2, no S3 or S4, no murmur, click or rub, no peripheral edema and peripheral pulses strong  ABDOMEN: soft, nontender, no hepatosplenomegaly, no masses and bowel sounds normal  MS: no gross musculoskeletal defects noted, no edema            Assessment & Plan     1. Chemical dependency (H)  Has been stable, will drop the dose of MSIR from 20 tablets per month to 15 tablets  - morphine (MSIR) 15 MG IR tablet; Take 0.5 tablets (7.5 mg) by mouth every 8 hours as needed for severe pain  Dispense: 15 tablet; Refill: 0    2. Primary localized osteoarthrosis of lower leg, unspecified laterality  Stable   - morphine (MSIR) 15 MG IR tablet; Take 0.5 tablets (7.5 mg) by mouth every 8 hours as needed for severe pain  " "Dispense: 15 tablet; Refill: 0    3. Type 2 diabetes mellitus with hyperglycemia, unspecified whether long term insulin use (H)  Has been fluctuating, will have him to bring glucose meter with insulin log book next month to adjust dose of insulin   - blood glucose monitoring (ACCU-CHEK COMPACT CARE KIT) meter device kit; Use to test blood sugars 2 times daily.  Dispense: 1 kit; Refill: 0  - blood glucose (ACCU-CHEK COMPACT DRUM) test strip; Use to test blood sugars 2 times daily.  Dispense: 100 strip; Refill: 3  - blood glucose monitoring (SOFTCLIX) lancets; Use to test blood sugar 2 times daily.  Dispense: 100 each; Refill: 3  - alcohol swab prep pads; Use to swab area of injection/chinyere as directed.  Dispense: 100 each; Refill: 3  - insulin glargine (LANTUS SOLOSTAR PEN) 100 UNIT/ML pen; Inject 58 Units Subcutaneous At Bedtime  Dispense: 15 mL; Refill: 3     Tobacco Cessation:   reports that he has been smoking cigarettes.  He has a 30.00 pack-year smoking history. He has never used smokeless tobacco.  Tobacco Cessation Action Plan: Information offered: Patient not interested at this time      BMI:   Estimated body mass index is 42.16 kg/m  as calculated from the following:    Height as of this encounter: 2.019 m (6' 7.5\").    Weight as of this encounter: 171.9 kg (379 lb).   Weight management plan: Discussed healthy diet and exercise guidelines        FUTURE APPOINTMENTS:       - Follow-up visit in 1 month     No follow-ups on file.    Edison New MD  [facility] [place]        "

## 2019-10-23 DIAGNOSIS — K59.01 SLOW TRANSIT CONSTIPATION: ICD-10-CM

## 2019-10-23 RX ORDER — POLYETHYLENE GLYCOL 3350 17 G/17G
POWDER, FOR SOLUTION ORAL
Qty: 510 G | Refills: 3 | Status: SHIPPED | OUTPATIENT
Start: 2019-10-23

## 2019-10-23 NOTE — TELEPHONE ENCOUNTER
"Requested Prescriptions   Pending Prescriptions Disp Refills     polyethylene glycol (MIRALAX/GLYCOLAX) powder [Pharmacy Med Name: POLYETH GLYCOL 3350 NF POWDER 510GM] 510 g 0     Sig: MIX 17GM(1 CAPFUL) AND DRINK ONCE DAILY       Laxatives Protocol Passed - 10/23/2019  9:31 AM        Passed - Patient is age 6 or older        Passed - Recent (12 mo) or future (30 days) visit within the authorizing provider's specialty     Patient has had an office visit with the authorizing provider or a provider within the authorizing providers department within the previous 12 mos or has a future within next 30 days. See \"Patient Info\" tab in inbasket, or \"Choose Columns\" in Meds & Orders section of the refill encounter.              Passed - Medication is active on med list        polyethylene glycol (MIRALAX/GLYCOLAX) powder  Last Written Prescription Date:  6-6-19  Last Fill Quantity: 510g,  # refills: 3   Last office visit: 7/12/2019 with prescribing provider:  BONNIE New   Future Office Visit:      "

## 2021-07-09 NOTE — TELEPHONE ENCOUNTER
Central Prior Authorization Team   Phone: [phone]    Prior Authorization Approval    Authorization Effective Date: 6/5/2019  Authorization Expiration Date: 7/4/2022  Medication: Victoza- APPROVED  Approved Dose/Quantity: 6mL per 30 days  Reference #:     Insurance Company: [employer] - Phone [phone] Fax [phone]  Expected CoPay:       CoPay Card Available:      Foundation Assistance Needed:    Which Pharmacy is filling the prescription (Not needed for infusion/clinic administered): [facility] [address] [place], [place] [address]  Pharmacy Notified: Yes  Patient Notified: Yes  **Instructed pharmacy to notify patient when script is ready to /ship.**            
Prior Authorization Retail Medication Request    Medication/Dose: VICTOZA PEN 18 MG/3ML soln  ICD code (if different than what is on RX):    Previously Tried and Failed:    Rationale:      Insurance Name:    Insurance ID:        Pharmacy Information (if different than what is on RX)  Name:    Phone:      
[phone]

## 2024-01-01 ENCOUNTER — TELEPHONE (OUTPATIENT)
Dept: CARDIOLOGY | Facility: CLINIC | Age: 69
End: 2024-01-01

## 2024-01-01 ENCOUNTER — TRANSCRIBE ORDERS (OUTPATIENT)
Dept: OTHER | Age: 69
End: 2024-01-01

## 2024-01-01 ENCOUNTER — MEDICAL CORRESPONDENCE (OUTPATIENT)
Dept: HEALTH INFORMATION MANAGEMENT | Facility: CLINIC | Age: 69
End: 2024-01-01

## 2025-01-21 NOTE — Clinical Note
"  2017    [facility]  Surgery Consultation    RE:  Michael SMITH Miroslava-:  59    PCP:  Edison New [phone]    HPI: Patient is a 61 year old male who presents with complaint of ventral hernia. Hernia has been present for last several months. Pain is primarily located in the periumbilical. Patient states the pain is worse with heavy lifting. Pain is rated as moderate. Symptoms are improved with rest. Hernia has been incarcerated. Patient has not had any symptoms of bowel obstruction. Patient denies fevers, chills, nausea, vomiting, SOB, or chest pain,.    PMH:   has a past medical history of Other and unspecified disc disorder of unspecified region; Depressive disorder, not elsewhere classified; Morbid obesity with BMI of 45.0-49.9, adult (H); Morbid obesity with BMI of 45.0-49.9, adult (H); Morbid obesity (H) (2014); and Type 2 diabetes mellitus with hyperglycemia (H) (10/28/2015).  PSH:    has past surgical history that includes surgical history of -  (10/03).  Social History:   reports that he has been smoking Cigarettes.  He has a 30 pack-year smoking history. He has never used smokeless tobacco. He reports that he does not drink alcohol or use illicit drugs.  Family History:   family history includes DIABETES in his brother.  Medications/Allergies: Home medications and allergies reviewed.    ROS:  The 10 point Review of Systems is negative other than noted in the HPI.    Physical Exam:  /99 mmHg  Pulse 85  Ht 6' 7.5\" (2.019 m)  Wt 385 lb (174.635 kg)  BMI 42.84 kg/m2  GENERAL: Generally appears with significant central obesity, strong smell of cigarettes.  Psych: Alert and Oriented.  Normal affect  Eyes: Sclera clear  Respiratory: Breathing heavy but without distress.  Cardiovascular:  No JVD  GI: Abdomen Distended Soft, Mild tenderness to palpation, periumbilical, ventral hernia palpated.  Lymphatic/Hematologic/Immune:  No lymphadenopathy  Integumentary:  No " 12 HOUR SUDAFED        kalie  Neurological: grossly intact     All new lab and imaging data was reviewed.     Impression and Plan:  Patient is a 61 year old male with ventral hernias along the midline    PLAN: Laparoscopic ventral hernia repair, smoking cessation, weight loss.  I discussed the rationale and options for repair of ventral hernias including laparoscopic VS open approach, best if repair occurs at least six weeks after smoking cessation and after significant weight loss.  This may not be achieved before the return of symptoms so he is going to look into proceeding with repair sooner rather than later.  The associated risk, benefits, hopeful outcomes and possible complications both in the short and in the long term, were discussed in details with the patient. He indicated understanding of the discussion, asked appropriate questions, and provided consent. Signs and symptoms of incarceration were discussed. If these develop in the interim, he knows to visit the ER. I have provided the patient with an information pamphlet.  Thank you very much for this consult, please call me if you have any questoins.    Jairo Angel M.D.  [facility]  [phone]

## (undated) DEVICE — ENDO TROCAR OPTICAL 12MM VERSAPORT PLUS W/FIX CAN ONB12STF

## (undated) DEVICE — SU MONOCRYL 4-0 PS-2 18" UND Y496G

## (undated) DEVICE — SU VICRYL 0 CT-2 27" J334H

## (undated) DEVICE — BLADE CLIPPER 4406

## (undated) DEVICE — GLOVE PROTEXIS BLUE W/NEU-THERA 7.5  2D73EB75

## (undated) DEVICE — SOL NACL 0.9% INJ 1000ML BAG 2B1324X

## (undated) DEVICE — DRAPE BREAST/CHEST 29420

## (undated) DEVICE — PACK LAP CHOLE SLC15LCFSD

## (undated) DEVICE — PREP CHLORAPREP 26ML TINTED ORANGE  260815

## (undated) DEVICE — ESU HOLDER LAP INST DISP PURPLE LONG 330MM H-PRO-330

## (undated) DEVICE — GLOVE PROTEXIS MICRO 7.5  2D73PM75

## (undated) DEVICE — LINEN TOWEL PACK X5 5464

## (undated) DEVICE — SU NUROLON 0 MO-7 CR 8X18" C541D

## (undated) DEVICE — DEVICE FIXATION SECURESTRAP TACKER 5MM ABSORB STRAP25

## (undated) RX ORDER — HYDROMORPHONE HYDROCHLORIDE 1 MG/ML
INJECTION, SOLUTION INTRAMUSCULAR; INTRAVENOUS; SUBCUTANEOUS
Status: DISPENSED
Start: 2018-03-13

## (undated) RX ORDER — LIDOCAINE HYDROCHLORIDE 20 MG/ML
INJECTION, SOLUTION EPIDURAL; INFILTRATION; INTRACAUDAL; PERINEURAL
Status: DISPENSED
Start: 2018-03-13

## (undated) RX ORDER — PROPOFOL 10 MG/ML
INJECTION, EMULSION INTRAVENOUS
Status: DISPENSED
Start: 2018-03-13

## (undated) RX ORDER — OXYCODONE AND ACETAMINOPHEN 5; 325 MG/1; MG/1
TABLET ORAL
Status: DISPENSED
Start: 2018-03-13

## (undated) RX ORDER — ONDANSETRON 2 MG/ML
INJECTION INTRAMUSCULAR; INTRAVENOUS
Status: DISPENSED
Start: 2018-03-13

## (undated) RX ORDER — KETOROLAC TROMETHAMINE 30 MG/ML
INJECTION, SOLUTION INTRAMUSCULAR; INTRAVENOUS
Status: DISPENSED
Start: 2018-03-13

## (undated) RX ORDER — ACETAMINOPHEN 500 MG
TABLET ORAL
Status: DISPENSED
Start: 2018-03-13

## (undated) RX ORDER — KETAMINE HYDROCHLORIDE 10 MG/ML
INJECTION INTRAMUSCULAR; INTRAVENOUS
Status: DISPENSED
Start: 2018-03-13

## (undated) RX ORDER — LIDOCAINE HYDROCHLORIDE 10 MG/ML
INJECTION, SOLUTION INFILTRATION; PERINEURAL
Status: DISPENSED
Start: 2018-03-13

## (undated) RX ORDER — FENTANYL CITRATE 50 UG/ML
INJECTION, SOLUTION INTRAMUSCULAR; INTRAVENOUS
Status: DISPENSED
Start: 2018-03-13

## (undated) RX ORDER — DEXAMETHASONE SODIUM PHOSPHATE 4 MG/ML
INJECTION, SOLUTION INTRA-ARTICULAR; INTRALESIONAL; INTRAMUSCULAR; INTRAVENOUS; SOFT TISSUE
Status: DISPENSED
Start: 2018-03-13

## (undated) RX ORDER — HYDROXYZINE HYDROCHLORIDE 50 MG/1
TABLET, FILM COATED ORAL
Status: DISPENSED
Start: 2018-03-13

## (undated) RX ORDER — CEFAZOLIN SODIUM 1 G/50ML
SOLUTION INTRAVENOUS
Status: DISPENSED
Start: 2018-03-13

## (undated) RX ORDER — GLYCOPYRROLATE 0.2 MG/ML
INJECTION, SOLUTION INTRAMUSCULAR; INTRAVENOUS
Status: DISPENSED
Start: 2018-03-13